# Patient Record
Sex: MALE | Race: WHITE | NOT HISPANIC OR LATINO | Employment: FULL TIME | URBAN - NONMETROPOLITAN AREA
[De-identification: names, ages, dates, MRNs, and addresses within clinical notes are randomized per-mention and may not be internally consistent; named-entity substitution may affect disease eponyms.]

---

## 2023-04-18 PROBLEM — R91.8 LUNG NODULES: Status: ACTIVE | Noted: 2023-04-18

## 2023-04-18 PROBLEM — I15.2 HYPERTENSION ASSOCIATED WITH DIABETES (MULTI): Status: ACTIVE | Noted: 2023-04-18

## 2023-04-18 PROBLEM — F17.210 CIGARETTE NICOTINE DEPENDENCE: Status: ACTIVE | Noted: 2023-04-18

## 2023-04-18 PROBLEM — K21.9 GERD (GASTROESOPHAGEAL REFLUX DISEASE): Status: ACTIVE | Noted: 2023-04-18

## 2023-04-18 PROBLEM — R35.1 NOCTURIA: Status: ACTIVE | Noted: 2023-04-18

## 2023-04-18 PROBLEM — E11.59 HYPERTENSION ASSOCIATED WITH DIABETES (MULTI): Status: ACTIVE | Noted: 2023-04-18

## 2023-04-18 PROBLEM — N40.1 BENIGN PROSTATIC HYPERPLASIA WITH URINARY FREQUENCY: Status: ACTIVE | Noted: 2023-04-18

## 2023-04-18 PROBLEM — R35.0 BENIGN PROSTATIC HYPERPLASIA WITH URINARY FREQUENCY: Status: ACTIVE | Noted: 2023-04-18

## 2023-04-18 PROBLEM — E78.2 MIXED HYPERLIPIDEMIA DUE TO TYPE 2 DIABETES MELLITUS (MULTI): Status: ACTIVE | Noted: 2023-04-18

## 2023-04-18 PROBLEM — E11.69 MIXED HYPERLIPIDEMIA DUE TO TYPE 2 DIABETES MELLITUS (MULTI): Status: ACTIVE | Noted: 2023-04-18

## 2023-04-18 PROBLEM — G47.33 OSA ON CPAP: Status: ACTIVE | Noted: 2023-04-18

## 2023-04-18 PROBLEM — K63.5 COLON POLYP: Status: ACTIVE | Noted: 2023-04-18

## 2023-04-18 PROBLEM — J44.9 CHRONIC OBSTRUCTIVE PULMONARY DISEASE (MULTI): Status: ACTIVE | Noted: 2023-04-18

## 2023-04-18 RX ORDER — ALBUTEROL SULFATE 90 UG/1
2 AEROSOL, METERED RESPIRATORY (INHALATION) EVERY 6 HOURS PRN
COMMUNITY
Start: 2020-08-19 | End: 2023-06-29 | Stop reason: SDUPTHER

## 2023-04-18 RX ORDER — CHLORTHALIDONE 25 MG/1
1 TABLET ORAL DAILY
COMMUNITY
Start: 2022-03-02 | End: 2024-02-09 | Stop reason: SDUPTHER

## 2023-04-18 RX ORDER — IMIQUIMOD 12.5 MG/.25G
1 CREAM TOPICAL DAILY
COMMUNITY
Start: 2022-02-02

## 2023-04-18 RX ORDER — TIRZEPATIDE 2.5 MG/.5ML
2.5 INJECTION, SOLUTION SUBCUTANEOUS
COMMUNITY
End: 2023-08-08 | Stop reason: ALTCHOICE

## 2023-04-18 RX ORDER — IPRATROPIUM BROMIDE AND ALBUTEROL SULFATE 2.5; .5 MG/3ML; MG/3ML
3 SOLUTION RESPIRATORY (INHALATION) 4 TIMES DAILY
COMMUNITY
Start: 2022-02-02

## 2023-04-18 RX ORDER — LISINOPRIL 10 MG/1
1 TABLET ORAL DAILY
COMMUNITY
Start: 2022-03-02 | End: 2024-02-09 | Stop reason: SDUPTHER

## 2023-04-18 RX ORDER — OMEPRAZOLE 40 MG/1
1 CAPSULE, DELAYED RELEASE ORAL DAILY
COMMUNITY
Start: 2020-10-09 | End: 2023-09-05 | Stop reason: SDUPTHER

## 2023-04-18 RX ORDER — ATORVASTATIN CALCIUM 40 MG/1
1 TABLET, FILM COATED ORAL NIGHTLY
COMMUNITY
Start: 2020-09-09 | End: 2024-02-09 | Stop reason: SDUPTHER

## 2023-04-18 RX ORDER — NIFEDIPINE 30 MG/1
1 TABLET, FILM COATED, EXTENDED RELEASE ORAL DAILY
COMMUNITY
Start: 2022-02-02 | End: 2024-02-09 | Stop reason: SDUPTHER

## 2023-04-18 RX ORDER — ASPIRIN 81 MG/1
1 TABLET ORAL DAILY
COMMUNITY
Start: 2020-09-14 | End: 2023-09-05 | Stop reason: SDUPTHER

## 2023-04-18 RX ORDER — CLOTRIMAZOLE 1 %
1 CREAM (GRAM) TOPICAL 2 TIMES DAILY
COMMUNITY
Start: 2022-02-02

## 2023-04-19 ENCOUNTER — OFFICE VISIT (OUTPATIENT)
Dept: PRIMARY CARE | Facility: CLINIC | Age: 53
End: 2023-04-19
Payer: COMMERCIAL

## 2023-04-19 VITALS
BODY MASS INDEX: 40.43 KG/M2 | HEIGHT: 74 IN | WEIGHT: 315 LBS | HEART RATE: 84 BPM | DIASTOLIC BLOOD PRESSURE: 82 MMHG | SYSTOLIC BLOOD PRESSURE: 120 MMHG

## 2023-04-19 DIAGNOSIS — Z09 HOSPITAL DISCHARGE FOLLOW-UP: ICD-10-CM

## 2023-04-19 DIAGNOSIS — M79.604 LOW BACK PAIN RADIATING TO RIGHT LEG: ICD-10-CM

## 2023-04-19 DIAGNOSIS — I15.2 HYPERTENSION ASSOCIATED WITH DIABETES (MULTI): Primary | ICD-10-CM

## 2023-04-19 DIAGNOSIS — I65.23 BILATERAL CAROTID ARTERY STENOSIS: ICD-10-CM

## 2023-04-19 DIAGNOSIS — E66.01 CLASS 3 SEVERE OBESITY DUE TO EXCESS CALORIES WITH SERIOUS COMORBIDITY AND BODY MASS INDEX (BMI) OF 45.0 TO 49.9 IN ADULT (MULTI): ICD-10-CM

## 2023-04-19 DIAGNOSIS — E11.59 HYPERTENSION ASSOCIATED WITH DIABETES (MULTI): Primary | ICD-10-CM

## 2023-04-19 DIAGNOSIS — D22.9 ATYPICAL MOLE: ICD-10-CM

## 2023-04-19 DIAGNOSIS — M54.50 LOW BACK PAIN RADIATING TO RIGHT LEG: ICD-10-CM

## 2023-04-19 PROBLEM — E66.813 CLASS 3 SEVERE OBESITY DUE TO EXCESS CALORIES WITH SERIOUS COMORBIDITY AND BODY MASS INDEX (BMI) OF 45.0 TO 49.9 IN ADULT: Status: ACTIVE | Noted: 2023-04-19

## 2023-04-19 PROCEDURE — 3079F DIAST BP 80-89 MM HG: CPT | Performed by: NURSE PRACTITIONER

## 2023-04-19 PROCEDURE — 4010F ACE/ARB THERAPY RXD/TAKEN: CPT | Performed by: NURSE PRACTITIONER

## 2023-04-19 PROCEDURE — 99214 OFFICE O/P EST MOD 30 MIN: CPT | Performed by: NURSE PRACTITIONER

## 2023-04-19 PROCEDURE — 4004F PT TOBACCO SCREEN RCVD TLK: CPT | Performed by: NURSE PRACTITIONER

## 2023-04-19 PROCEDURE — 3008F BODY MASS INDEX DOCD: CPT | Performed by: NURSE PRACTITIONER

## 2023-04-19 PROCEDURE — 3044F HG A1C LEVEL LT 7.0%: CPT | Performed by: NURSE PRACTITIONER

## 2023-04-19 PROCEDURE — 3074F SYST BP LT 130 MM HG: CPT | Performed by: NURSE PRACTITIONER

## 2023-04-19 RX ORDER — METOPROLOL SUCCINATE 25 MG/1
25 TABLET, EXTENDED RELEASE ORAL DAILY
COMMUNITY
End: 2023-04-19 | Stop reason: SDUPTHER

## 2023-04-19 RX ORDER — METOPROLOL SUCCINATE 25 MG/1
25 TABLET, EXTENDED RELEASE ORAL DAILY
Qty: 90 TABLET | Refills: 3 | Status: SHIPPED | OUTPATIENT
Start: 2023-04-19 | End: 2024-02-26 | Stop reason: DRUGHIGH

## 2023-04-19 ASSESSMENT — ENCOUNTER SYMPTOMS
ABDOMINAL PAIN: 0
NERVOUS/ANXIOUS: 0
EYE REDNESS: 0
DIFFICULTY URINATING: 0
JOINT SWELLING: 0
CHILLS: 0
NECK PAIN: 0
SPEECH DIFFICULTY: 0
FLANK PAIN: 0
UNEXPECTED WEIGHT CHANGE: 0
WHEEZING: 0
WOUND: 0
SHORTNESS OF BREATH: 0
BACK PAIN: 1
NAUSEA: 0
PHOTOPHOBIA: 0
CONSTIPATION: 0
VOMITING: 0
APNEA: 0
FATIGUE: 0
SORE THROAT: 0
HEADACHES: 0
FREQUENCY: 0
SLEEP DISTURBANCE: 0
COUGH: 0
WEAKNESS: 0
PALPITATIONS: 0
TROUBLE SWALLOWING: 0
EYE PAIN: 0
MYALGIAS: 0
CHOKING: 0
BLOOD IN STOOL: 0
FEVER: 0
DIZZINESS: 0
SEIZURES: 0
HEMATURIA: 0
ABDOMINAL DISTENTION: 0
NUMBNESS: 0
CONFUSION: 0
ARTHRALGIAS: 0
DYSURIA: 0
CHEST TIGHTNESS: 0
FACIAL ASYMMETRY: 0
DIARRHEA: 0

## 2023-04-19 ASSESSMENT — PATIENT HEALTH QUESTIONNAIRE - PHQ9
2. FEELING DOWN, DEPRESSED OR HOPELESS: NOT AT ALL
1. LITTLE INTEREST OR PLEASURE IN DOING THINGS: NOT AT ALL
SUM OF ALL RESPONSES TO PHQ9 QUESTIONS 1 AND 2: 0

## 2023-04-19 NOTE — PROGRESS NOTES
"Subjective   Patient ID: Boy Davis Jr. is a 52 y.o. male who presents for Hospital Follow-up (F/U HOSPITAL DISCHARGE 4/13/23 CHEST PAIN AND DIZZINESS. PATIENT STILL HAS SOME CHEST PAIN OFF/ON BUT OVERALL FEELING WELL. PENDING APPOINTMENT WITH DR. KEARNS THIS Friday ([facility]). DISCHARGED WITH NEW MED METOPROLOL WHICH HE CLAIMS TO BE TAKING. ).      HPI:  Presents today for [facility] HOSPITAL DISCHARGE ON 4/13/23 FOR CP AND DIZZINESS. HEART CATH DONE AND UNREMARKABLE. CAROTID DOPPLER SHOWED B/L STENOSIS LESS THAN 50%. APPT WITH [facility] ON 4/21/23.  HE HAS BEEN OFF WORK UNTIL HE IS CLEARED TO GO BACK ROM [facility]. C/O LOW BACK PAIN THAT RADIATES TO RIGHT THIGH X 3 WEEKS modifying factors consists of DENIES INJURY  associated symptoms consist of DENIES BOWEL OR BLADDER SYMPTOMS.  prior treatment consists of medication NONE    C/O 2 MOLES THAT HAVE GROWN AND CHANGED COLORS OVER THE PAST SEVERAL YEARS    modifying factors consists of    associated symptoms consist of     prior treatment consists of medication NONE     Visit Vitals  /82   Pulse 84   Ht 1.88 m (6' 2\")   Wt (!) 173 kg (381 lb)   BMI 48.92 kg/m²   Smoking Status Every Day   BSA 3.01 m²        Review of Systems   Constitutional:  Negative for chills, fatigue, fever and unexpected weight change.   HENT:  Negative for congestion, ear pain, sore throat and trouble swallowing.    Eyes:  Negative for photophobia, pain, redness and visual disturbance.   Respiratory:  Negative for apnea, cough, choking, chest tightness, shortness of breath and wheezing.    Cardiovascular:  Negative for chest pain, palpitations and leg swelling.   Gastrointestinal:  Negative for abdominal distention, abdominal pain, blood in stool, constipation, diarrhea, nausea and vomiting.   Genitourinary:  Negative for difficulty urinating, dysuria, flank pain, frequency, hematuria and urgency.   Musculoskeletal:  Positive for back pain. Negative for arthralgias, gait problem, joint swelling, " myalgias and neck pain.   Skin:  Negative for rash and wound.   Neurological:  Negative for dizziness, seizures, syncope, facial asymmetry, speech difficulty, weakness, numbness and headaches.   Psychiatric/Behavioral:  Negative for confusion, sleep disturbance and suicidal ideas. The patient is not nervous/anxious.        Objective     Physical Exam  Constitutional:       Appearance: Normal appearance. He is normal weight.   HENT:      Head: Normocephalic.   Eyes:      Extraocular Movements: Extraocular movements intact.      Conjunctiva/sclera: Conjunctivae normal.      Pupils: Pupils are equal, round, and reactive to light.   Cardiovascular:      Rate and Rhythm: Normal rate and regular rhythm.      Pulses: Normal pulses.      Heart sounds: Normal heart sounds.   Pulmonary:      Effort: Pulmonary effort is normal.      Breath sounds: Normal breath sounds.   Musculoskeletal:      Cervical back: Normal range of motion.      Comments: RIGHT SIDED LOW BACK PAIN   Skin:     General: Skin is warm and dry.      Comments: MOLE UNDER LEFT EYE. RAISED 2 DIFFERENT COLORS. IRREGULAR BORDERS.      MOLE TO LEFT SIDE OF NECK. BLACK AND RAISED WITH IRREGULAR BORDERS    Neurological:      General: No focal deficit present.      Mental Status: He is alert and oriented to person, place, and time.   Psychiatric:         Mood and Affect: Mood normal.         Behavior: Behavior normal.         Thought Content: Thought content normal.         Judgment: Judgment normal.            Assessment/Plan   Problem List Items Addressed This Visit          Circulatory    Hypertension associated with diabetes (CMS/[facility]) - Primary    Relevant Medications    metoprolol succinate XL (Toprol-XL) 25 mg 24 hr tablet    Bilateral carotid artery stenosis       Endocrine/Metabolic    Class 3 severe obesity due to excess calories with serious comorbidity and body mass index (BMI) of 45.0 to 49.9 in adult (CMS/[facility])       Other    Atypical mole    Relevant  Orders    Referral to Dermatology    Hospital discharge follow-up    Low back pain radiating to right leg       INSTRUCTED TO DISCUSS STEROID USE WITH [facility] AND IF IN AGREEMENT, RETURN Friday FOR DEXA INJECTION.     WE DISCUSSED MOST COMMON SIDE EFFECTS OF PRESCRIBED MEDICATIONS. INDICATIONS, RISK, COMPLICATIONS, AND ALTERNATIVES OF MEDICATION/THERAPEUTICS WERE EXPLAINED AND DISCUSSED. PLEASE MONITOR CLOSELY FOR ANY UNTOWARD SIDE EFFECTS OR COMPLICATIONS OF MEDICATIONS. PATIENT IS STRONGLY ADVISED TO BE COMPLIANT WITH RECOMMENDATIONS. QUESTIONS AND CONCERNS WERE ADDRESSED. INSTRUCTED TO CALL, RETURN SOONER, OR GO TO THE ER,  IF SYMPTOMS PERSIST OR WORSEN. THEY VOICED UNDERSTANDING AND  DENIES FURTHER QUESTIONS AT THIS TIME.    TIME CODE  1. PREPARATION FOR PATIENT'S VISIT (REVIEWING CHART, CURRENT MEDICAL RECORDS, OUTSIDE HEALTH PROVIDER RECORDS, PREVIOUS HISTORY, EXAM, TEST, PROCEDURE, AND MEDICATIONS)  2. FACE TO FACE ENCOUNTER OBTAINING HISTORY FROM THE PATIENT/FAMILY/CAREGIVERS; PERFORMING EVALUATION AND EXAMINATION; ORDERING TESTS OR PROCEDURES; REFERRING AND COMMUNICATING WITH OTHER HEALTHCARE PROVIDERS; COUNSELING AND EDUCATION OF THE PATIENT/FAMILY/CAREGIVERS; INDEPENDENTLY INTERPRETING RESULTS (TESTS, LABS, PROCEDURES, IMAGING) AND COMMUNICATING AND EXPLAINING RESULTS TO THE PATIENT/FAMILY/CAREGIVERS  3. COORDINATION OF CARE; PREPARING AND PRINTING DISCHARGE INSTRUCTIONS AND ANY EDUCATIONAL MATERIAL FOR THE PATIENT/FAMILY/CAREGIVERS. DOCUMENTING CLINICAL INFORMATION IN THE ELECTRONIC MEDICAL RECORD   4. REVIEWING OARRS AS NEEDED    MDM  1) COMPLEXITY: MORE THAN 1 STABLE CHRONIC CONDITION ADDRESSED OR 1 ACUTE ILLNESS ADDRESSED   2)DATA: TESTS INTERPRETED AND OR ORDERED, TOOK INDEPENDENT HISTORY OR RECORDS REVIEWED  3)RISK: MODERATE RISK DUE TO NATURE OF MEDICAL CONDITIONS/COMORBIDITY OR MEDICATIONS ORDERED OR SURGICAL OR PROCEDURE REFERRAL    Follow up as before    REFER TO [facility] - [facility]

## 2023-05-04 PROBLEM — R94.39 ABNORMAL STRESS TEST: Status: ACTIVE | Noted: 2023-05-04

## 2023-05-04 PROBLEM — R60.0 EDEMA, LOWER EXTREMITY: Status: ACTIVE | Noted: 2023-05-04

## 2023-05-04 PROBLEM — R00.2 PALPITATIONS: Status: ACTIVE | Noted: 2023-05-04

## 2023-05-04 PROBLEM — I87.2 STASIS DERMATITIS, ACUTE: Status: ACTIVE | Noted: 2023-05-04

## 2023-05-09 ENCOUNTER — OFFICE VISIT (OUTPATIENT)
Dept: PRIMARY CARE | Facility: CLINIC | Age: 53
End: 2023-05-09
Payer: COMMERCIAL

## 2023-05-09 VITALS
HEART RATE: 87 BPM | SYSTOLIC BLOOD PRESSURE: 124 MMHG | DIASTOLIC BLOOD PRESSURE: 83 MMHG | BODY MASS INDEX: 40.43 KG/M2 | OXYGEN SATURATION: 96 % | WEIGHT: 315 LBS | HEIGHT: 74 IN

## 2023-05-09 DIAGNOSIS — M54.41 ACUTE RIGHT-SIDED LOW BACK PAIN WITH RIGHT-SIDED SCIATICA: Primary | ICD-10-CM

## 2023-05-09 DIAGNOSIS — E66.01 CLASS 3 SEVERE OBESITY DUE TO EXCESS CALORIES WITH SERIOUS COMORBIDITY AND BODY MASS INDEX (BMI) OF 45.0 TO 49.9 IN ADULT (MULTI): ICD-10-CM

## 2023-05-09 PROCEDURE — 4004F PT TOBACCO SCREEN RCVD TLK: CPT | Performed by: NURSE PRACTITIONER

## 2023-05-09 PROCEDURE — 99214 OFFICE O/P EST MOD 30 MIN: CPT | Performed by: NURSE PRACTITIONER

## 2023-05-09 PROCEDURE — 96372 THER/PROPH/DIAG INJ SC/IM: CPT | Performed by: NURSE PRACTITIONER

## 2023-05-09 PROCEDURE — 3008F BODY MASS INDEX DOCD: CPT | Performed by: NURSE PRACTITIONER

## 2023-05-09 PROCEDURE — 3079F DIAST BP 80-89 MM HG: CPT | Performed by: NURSE PRACTITIONER

## 2023-05-09 PROCEDURE — 3074F SYST BP LT 130 MM HG: CPT | Performed by: NURSE PRACTITIONER

## 2023-05-09 PROCEDURE — 4010F ACE/ARB THERAPY RXD/TAKEN: CPT | Performed by: NURSE PRACTITIONER

## 2023-05-09 PROCEDURE — 3044F HG A1C LEVEL LT 7.0%: CPT | Performed by: NURSE PRACTITIONER

## 2023-05-09 RX ORDER — PREDNISONE 10 MG/1
30 TABLET ORAL DAILY
Qty: 15 TABLET | Refills: 0 | Status: SHIPPED
Start: 2023-05-09 | End: 2023-06-13 | Stop reason: ALTCHOICE

## 2023-05-09 RX ORDER — NAPROXEN 500 MG/1
500 TABLET ORAL 2 TIMES DAILY PRN
Qty: 60 TABLET | Refills: 0 | Status: SHIPPED
Start: 2023-05-09 | End: 2023-06-13 | Stop reason: ALTCHOICE

## 2023-05-09 ASSESSMENT — ENCOUNTER SYMPTOMS
NUMBNESS: 0
PALPITATIONS: 0
HEMATURIA: 0
SPEECH DIFFICULTY: 0
ABDOMINAL DISTENTION: 0
WOUND: 0
NECK PAIN: 0
APNEA: 0
BLOOD IN STOOL: 0
PHOTOPHOBIA: 0
CHOKING: 0
FREQUENCY: 0
FLANK PAIN: 0
UNEXPECTED WEIGHT CHANGE: 0
MYALGIAS: 0
FEVER: 0
DIZZINESS: 0
CONSTIPATION: 0
SORE THROAT: 0
FATIGUE: 0
COUGH: 0
BACK PAIN: 1
TROUBLE SWALLOWING: 0
WEAKNESS: 0
NAUSEA: 0
DYSURIA: 0
ARTHRALGIAS: 0
EYE REDNESS: 0
CONFUSION: 0
NERVOUS/ANXIOUS: 0
FACIAL ASYMMETRY: 0
SEIZURES: 0
HEADACHES: 0
SHORTNESS OF BREATH: 0
DIARRHEA: 0
DIFFICULTY URINATING: 0
SLEEP DISTURBANCE: 0
EYE PAIN: 0
VOMITING: 0
CHEST TIGHTNESS: 0
CHILLS: 0
JOINT SWELLING: 0
WHEEZING: 0
ABDOMINAL PAIN: 0

## 2023-05-09 NOTE — PROGRESS NOTES
"Subjective   Patient ID: Boy Davis Jr. is a 52 y.o. male who presents for Leg Pain (RIGHT; X 1 MONTH ) and Hip Pain (RIGHT X 1 MONTH ).      HPI:  Presents today for C/O RIGHT LOW BACK PAIN X 1 MONTH  modifying factors consists of DENIES INJURY  associated symptoms consist of RADIATES TO RIGHT HIP  prior treatment consists of medication IBUPROFEN     Visit Vitals  /83 (BP Location: Left arm, Patient Position: Sitting)   Pulse 87   Ht 1.88 m (6' 2\")   Wt (!) 168 kg (370 lb)   SpO2 96%   BMI 47.51 kg/m²   Smoking Status Every Day   BSA 2.96 m²        Review of Systems   Constitutional:  Negative for chills, fatigue, fever and unexpected weight change.   HENT:  Negative for congestion, ear pain, sore throat and trouble swallowing.    Eyes:  Negative for photophobia, pain, redness and visual disturbance.   Respiratory:  Negative for apnea, cough, choking, chest tightness, shortness of breath and wheezing.    Cardiovascular:  Negative for chest pain, palpitations and leg swelling.   Gastrointestinal:  Negative for abdominal distention, abdominal pain, blood in stool, constipation, diarrhea, nausea and vomiting.   Genitourinary:  Negative for difficulty urinating, dysuria, flank pain, frequency, hematuria and urgency.   Musculoskeletal:  Positive for back pain. Negative for arthralgias, gait problem, joint swelling, myalgias and neck pain.   Skin:  Negative for rash and wound.   Neurological:  Negative for dizziness, seizures, syncope, facial asymmetry, speech difficulty, weakness, numbness and headaches.   Psychiatric/Behavioral:  Negative for confusion, sleep disturbance and suicidal ideas. The patient is not nervous/anxious.        Objective     Physical Exam  Constitutional:       Appearance: Normal appearance. He is normal weight.   HENT:      Head: Normocephalic.   Eyes:      Extraocular Movements: Extraocular movements intact.      Conjunctiva/sclera: Conjunctivae normal.      Pupils: Pupils are equal, " round, and reactive to light.   Cardiovascular:      Rate and Rhythm: Normal rate and regular rhythm.      Pulses: Normal pulses.      Heart sounds: Normal heart sounds.   Pulmonary:      Effort: Pulmonary effort is normal.      Breath sounds: Normal breath sounds.   Musculoskeletal:         General: Normal range of motion.      Cervical back: Normal range of motion.      Comments: LOW BACK PIAN RIGHT SIDE   Skin:     General: Skin is warm and dry.   Neurological:      General: No focal deficit present.      Mental Status: He is alert and oriented to person, place, and time.   Psychiatric:         Mood and Affect: Mood normal.         Behavior: Behavior normal.         Thought Content: Thought content normal.         Judgment: Judgment normal.            Assessment/Plan   Problem List Items Addressed This Visit          Endocrine/Metabolic    Class 3 severe obesity due to excess calories with serious comorbidity and body mass index (BMI) of 45.0 to 49.9 in adult (CMS/[facility])       Other    Acute right-sided low back pain with right-sided sciatica - Primary    Relevant Medications    predniSONE (Deltasone) 10 mg tablet    dexAMETHasone (Decadron) injection 4 mg (Start on 5/9/2023  3:15 PM)    naproxen (Naprosyn) 500 mg tablet     ALTERNATE HEAT AND ICE TO AFFECTED AREA 20 MINUTES ON/20 MINUTES OFF    WE DISCUSSED MOST COMMON SIDE EFFECTS OF PRESCRIBED MEDICATIONS. INDICATIONS, RISK, COMPLICATIONS, AND ALTERNATIVES OF MEDICATION/THERAPEUTICS WERE EXPLAINED AND DISCUSSED. PLEASE MONITOR CLOSELY FOR ANY UNTOWARD SIDE EFFECTS OR COMPLICATIONS OF MEDICATIONS. PATIENT IS STRONGLY ADVISED TO BE COMPLIANT WITH RECOMMENDATIONS. QUESTIONS AND CONCERNS WERE ADDRESSED. INSTRUCTED TO CALL, RETURN SOONER, OR GO TO THE ER,  IF SYMPTOMS PERSIST OR WORSEN. THEY VOICED UNDERSTANDING AND  DENIES FURTHER QUESTIONS AT THIS TIME.    TIME CODE  1. PREPARATION FOR PATIENT'S VISIT (REVIEWING CHART, CURRENT MEDICAL RECORDS, OUTSIDE HEALTH  PROVIDER RECORDS, PREVIOUS HISTORY, EXAM, TEST, PROCEDURE, AND MEDICATIONS)  2. FACE TO FACE ENCOUNTER OBTAINING HISTORY FROM THE PATIENT/FAMILY/CAREGIVERS; PERFORMING EVALUATION AND EXAMINATION; ORDERING TESTS OR PROCEDURES; REFERRING AND COMMUNICATING WITH OTHER HEALTHCARE PROVIDERS; COUNSELING AND EDUCATION OF THE PATIENT/FAMILY/CAREGIVERS; INDEPENDENTLY INTERPRETING RESULTS (TESTS, LABS, PROCEDURES, IMAGING) AND COMMUNICATING AND EXPLAINING RESULTS TO THE PATIENT/FAMILY/CAREGIVERS  3. COORDINATION OF CARE; PREPARING AND PRINTING DISCHARGE INSTRUCTIONS AND ANY EDUCATIONAL MATERIAL FOR THE PATIENT/FAMILY/CAREGIVERS. DOCUMENTING CLINICAL INFORMATION IN THE ELECTRONIC MEDICAL RECORD   4. REVIEWING OARRS AS NEEDED    MDM  1) COMPLEXITY: MORE THAN 1 STABLE CHRONIC CONDITION ADDRESSED OR 1 ACUTE ILLNESS ADDRESSED   2)DATA: TESTS INTERPRETED AND OR ORDERED, TOOK INDEPENDENT HISTORY OR RECORDS REVIEWED  3)RISK: MODERATE RISK DUE TO NATURE OF MEDICAL CONDITIONS/COMORBIDITY OR MEDICATIONS ORDERED OR SURGICAL OR PROCEDURE REFERRAL    Follow up as before

## 2023-06-13 ENCOUNTER — OFFICE VISIT (OUTPATIENT)
Dept: PRIMARY CARE | Facility: CLINIC | Age: 53
End: 2023-06-13
Payer: COMMERCIAL

## 2023-06-13 ENCOUNTER — TELEPHONE (OUTPATIENT)
Dept: PRIMARY CARE | Facility: CLINIC | Age: 53
End: 2023-06-13

## 2023-06-13 VITALS
DIASTOLIC BLOOD PRESSURE: 73 MMHG | WEIGHT: 315 LBS | HEIGHT: 74 IN | SYSTOLIC BLOOD PRESSURE: 115 MMHG | HEART RATE: 77 BPM | BODY MASS INDEX: 40.43 KG/M2

## 2023-06-13 DIAGNOSIS — E66.01 CLASS 3 SEVERE OBESITY DUE TO EXCESS CALORIES WITH SERIOUS COMORBIDITY AND BODY MASS INDEX (BMI) OF 45.0 TO 49.9 IN ADULT (MULTI): ICD-10-CM

## 2023-06-13 DIAGNOSIS — S32.050A COMPRESSION FRACTURE OF L5 VERTEBRA, INITIAL ENCOUNTER (MULTI): ICD-10-CM

## 2023-06-13 DIAGNOSIS — M79.604 LOW BACK PAIN RADIATING TO RIGHT LEG: Primary | ICD-10-CM

## 2023-06-13 DIAGNOSIS — M54.50 LOW BACK PAIN RADIATING TO RIGHT LEG: Primary | ICD-10-CM

## 2023-06-13 DIAGNOSIS — M51.36 LUMBAR DEGENERATIVE DISC DISEASE: ICD-10-CM

## 2023-06-13 PROCEDURE — 3074F SYST BP LT 130 MM HG: CPT | Performed by: NURSE PRACTITIONER

## 2023-06-13 PROCEDURE — 99214 OFFICE O/P EST MOD 30 MIN: CPT | Performed by: NURSE PRACTITIONER

## 2023-06-13 PROCEDURE — 3044F HG A1C LEVEL LT 7.0%: CPT | Performed by: NURSE PRACTITIONER

## 2023-06-13 PROCEDURE — 3078F DIAST BP <80 MM HG: CPT | Performed by: NURSE PRACTITIONER

## 2023-06-13 PROCEDURE — 3008F BODY MASS INDEX DOCD: CPT | Performed by: NURSE PRACTITIONER

## 2023-06-13 PROCEDURE — 4004F PT TOBACCO SCREEN RCVD TLK: CPT | Performed by: NURSE PRACTITIONER

## 2023-06-13 PROCEDURE — 4010F ACE/ARB THERAPY RXD/TAKEN: CPT | Performed by: NURSE PRACTITIONER

## 2023-06-13 RX ORDER — HYDROCODONE BITARTRATE AND ACETAMINOPHEN 5; 325 MG/1; MG/1
1 TABLET ORAL EVERY 6 HOURS PRN
Qty: 20 TABLET | Refills: 0 | Status: SHIPPED | OUTPATIENT
Start: 2023-06-13 | End: 2023-06-18

## 2023-06-13 RX ORDER — CYCLOBENZAPRINE HCL 10 MG
10 TABLET ORAL 3 TIMES DAILY PRN
Qty: 90 TABLET | Refills: 0 | Status: SHIPPED | OUTPATIENT
Start: 2023-06-13 | End: 2024-02-09 | Stop reason: SDUPTHER

## 2023-06-13 ASSESSMENT — ENCOUNTER SYMPTOMS
NECK PAIN: 0
ABDOMINAL PAIN: 0
CHOKING: 0
HEADACHES: 0
NUMBNESS: 0
PALPITATIONS: 0
HEMATURIA: 0
SEIZURES: 0
FEVER: 0
WOUND: 0
TROUBLE SWALLOWING: 0
PHOTOPHOBIA: 0
CONFUSION: 0
EYE REDNESS: 0
DIZZINESS: 0
MYALGIAS: 0
CONSTIPATION: 0
WHEEZING: 0
DIFFICULTY URINATING: 0
EYE PAIN: 0
DYSURIA: 0
UNEXPECTED WEIGHT CHANGE: 0
NAUSEA: 0
ABDOMINAL DISTENTION: 0
SLEEP DISTURBANCE: 0
FATIGUE: 0
CHEST TIGHTNESS: 0
JOINT SWELLING: 0
ARTHRALGIAS: 1
DIARRHEA: 0
WEAKNESS: 0
SHORTNESS OF BREATH: 0
NERVOUS/ANXIOUS: 0
BLOOD IN STOOL: 0
CHILLS: 0
FACIAL ASYMMETRY: 0
VOMITING: 0
BACK PAIN: 1
FREQUENCY: 0
FLANK PAIN: 0
SORE THROAT: 0
APNEA: 0
COUGH: 0
SPEECH DIFFICULTY: 0

## 2023-06-13 NOTE — PROGRESS NOTES
"Subjective   Patient ID: Boy Davis Jr. is a 52 y.o. male who presents for Follow-up (C/O WORSENING RT LEG PAIN WITH LOW BACK PAIN AND LT GROIN PAIN. NEEDS WORK NOTE).      HPI:  Presents today for C/O LOW BACK PAIN  X 6-8 WEEKS modifying factors consists of CHRONIC LOW BACK PAIN THAT HAS WORSENED OVER THE PAST 2 MONTHS. DENIES INJURY  associated symptoms consist of RIGHT PAIN RADIATES TO FOOT. LEFT PAIN RADIATES TO GROIN AREA. NO BOWEL OR BLADDER SYMPTOMS. TROUBLE SLEEPING R/T PAIN. PAIN 10+/10 AT TIMES  prior treatment consists of medication STRETCHES AND EXERCISES GUIDED BY PCP OTC ADVIL, NAPROXEN, TYLENOL, DEXA INJECTION, AND PO PREDNISONE. ICE AND HEAT. CANE     NEEDS WORK EXCUSE. WENT HOME YESTERDAY EARLY AND CALLED DOFF WORK TODAY R/T PAIN    Visit Vitals  /73   Pulse 77   Ht 1.88 m (6' 2\")   Wt (!) 168 kg (370 lb)   BMI 47.51 kg/m²   Smoking Status Every Day   BSA 2.96 m²        Review of Systems   Constitutional:  Negative for chills, fatigue, fever and unexpected weight change.   HENT:  Negative for congestion, ear pain, sore throat and trouble swallowing.    Eyes:  Negative for photophobia, pain, redness and visual disturbance.   Respiratory:  Negative for apnea, cough, choking, chest tightness, shortness of breath and wheezing.    Cardiovascular:  Negative for chest pain, palpitations and leg swelling.   Gastrointestinal:  Negative for abdominal distention, abdominal pain, blood in stool, constipation, diarrhea, nausea and vomiting.   Genitourinary:  Negative for difficulty urinating, dysuria, flank pain, frequency, hematuria and urgency.   Musculoskeletal:  Positive for arthralgias and back pain. Negative for gait problem, joint swelling, myalgias and neck pain.   Skin:  Negative for rash and wound.   Neurological:  Negative for dizziness, seizures, syncope, facial asymmetry, speech difficulty, weakness, numbness and headaches.   Psychiatric/Behavioral:  Negative for confusion, sleep " disturbance and suicidal ideas. The patient is not nervous/anxious.        Objective   STUDY:  SPINE, LUMBOSACRAL  CMPLT(BENDING)     INDICATION:  back pain.     COMPARISON:  None     ACCESSION NUMBER(S):  [id number]     ORDERING CLINICIAN:  ERIC BARNARD     FINDINGS:  Mild vertebral compression fracture at the superior endplate of L5,  age indeterminate.     Moderate to advanced diffuse lumbar degenerative change at all levels.     Mild levoconvex lumbar scoliosis. No pathologic motion.     IMPRESSION:  Mild vertebral compression fracture at the superior endplate of L5,  age indeterminate. Correlation with the patient's clinical  presentation and any history of trauma can determine the need for  further evaluation by MRI.     Moderate to advanced diffuse lumbar degenerative change at all levels.     Mild levoconvex lumbar scoliosis.  Physical Exam  Constitutional:       Appearance: Normal appearance. He is normal weight.   HENT:      Head: Normocephalic.   Eyes:      Extraocular Movements: Extraocular movements intact.      Conjunctiva/sclera: Conjunctivae normal.      Pupils: Pupils are equal, round, and reactive to light.   Cardiovascular:      Rate and Rhythm: Normal rate and regular rhythm.      Pulses: Normal pulses.      Heart sounds: Normal heart sounds.   Pulmonary:      Effort: Pulmonary effort is normal.      Breath sounds: Normal breath sounds.   Musculoskeletal:         General: Normal range of motion.      Cervical back: Normal range of motion.      Comments: LOW BACK PAIN R>L    WALKS WITH CANE   Skin:     General: Skin is warm and dry.   Neurological:      General: No focal deficit present.      Mental Status: He is alert and oriented to person, place, and time.   Psychiatric:         Mood and Affect: Mood normal.         Behavior: Behavior normal.         Thought Content: Thought content normal.         Judgment: Judgment normal.            Assessment/Plan   Problem List Items Addressed This Visit           Musculoskeletal    Compression fracture of fifth lumbar vertebra (CMS/HCC)    Relevant Orders    MR lumbar spine w and wo IV contrast    Creatinine, Serum    Referral to Neurosurgery    Back brace    XR DEXA bone density    Lumbar degenerative disc disease    Relevant Orders    MR lumbar spine w and wo IV contrast    Creatinine, Serum    Referral to Neurosurgery    XR DEXA bone density       Endocrine/Metabolic    Class 3 severe obesity due to excess calories with serious comorbidity and body mass index (BMI) of 45.0 to 49.9 in adult (CMS/HCC)       Other    Low back pain radiating to right leg - Primary    Relevant Medications    cyclobenzaprine (Flexeril) 10 mg tablet    HYDROcodone-acetaminophen (Norco) 5-325 mg tablet    Other Relevant Orders    XR lumbar spine 6+ views including oblique flexion extension (Completed)    MR lumbar spine w and wo IV contrast    Creatinine, Serum    Referral to Neurosurgery     SINCE HE HAS BEEN DOING THE EXERCISES AND STRETCHES GUIDED BY ME WITH NO RESULTS, AND THE MEDICATIONS HAVE NOT HELPED, I WILL ORDER  AN MRI TO FURTHER EVALUATE.     ADDENDUM 6/15/23- XR SHOWS VERTEBRAL COMPRESSION FRACTURE AT SUPERIOR ENDPLATE OF L5, ALONG WITH MODERATE TO ADVANCED DIFFUSE LUMBAR DEGENERATIVE CHANGES. MRI L SPINE ORDERED. TLSO BACK BRRACE ORDERED. REFER TO NEUROSURGERY       I HAVE REVIEWED THE OARRS, WHICH WAS APPROPRIATE. I HAVE EVALUATED THE RISKS OF ABUSE, ADDICTION, DIVERSION, AND DEPENDENCE. PRESCRIBED MEDICATION SEEMS APPROPRIATE FOR DOCUMENTED DIAGNOSIS. NO MED AGREEMENT OR UTOX COLLECTED R/T TEMP MED     WE DISCUSSED MOST COMMON SIDE EFFECTS OF PRESCRIBED MEDICATIONS. INDICATIONS, RISK, COMPLICATIONS, AND ALTERNATIVES OF MEDICATION/THERAPEUTICS WERE EXPLAINED AND DISCUSSED. PLEASE MONITOR CLOSELY FOR ANY UNTOWARD SIDE EFFECTS OR COMPLICATIONS OF MEDICATIONS. PATIENT IS STRONGLY ADVISED TO BE COMPLIANT WITH RECOMMENDATIONS. QUESTIONS AND CONCERNS WERE ADDRESSED. INSTRUCTED TO  CALL, RETURN SOONER, OR GO TO THE ER,  IF SYMPTOMS PERSIST OR WORSEN. THEY VOICED UNDERSTANDING AND  DENIES FURTHER QUESTIONS AT THIS TIME.    TIME CODE  1. PREPARATION FOR PATIENT'S VISIT (REVIEWING CHART, CURRENT MEDICAL RECORDS, OUTSIDE HEALTH PROVIDER RECORDS, PREVIOUS HISTORY, EXAM, TEST, PROCEDURE, AND MEDICATIONS)  2. FACE TO FACE ENCOUNTER OBTAINING HISTORY FROM THE PATIENT/FAMILY/CAREGIVERS; PERFORMING EVALUATION AND EXAMINATION; ORDERING TESTS OR PROCEDURES; REFERRING AND COMMUNICATING WITH OTHER HEALTHCARE PROVIDERS; COUNSELING AND EDUCATION OF THE PATIENT/FAMILY/CAREGIVERS; INDEPENDENTLY INTERPRETING RESULTS (TESTS, LABS, PROCEDURES, IMAGING) AND COMMUNICATING AND EXPLAINING RESULTS TO THE PATIENT/FAMILY/CAREGIVERS  3. COORDINATION OF CARE; PREPARING AND PRINTING DISCHARGE INSTRUCTIONS AND ANY EDUCATIONAL MATERIAL FOR THE PATIENT/FAMILY/CAREGIVERS. DOCUMENTING CLINICAL INFORMATION IN THE ELECTRONIC MEDICAL RECORD   4. REVIEWING OARRS AS NEEDED    MDM  1) COMPLEXITY: MORE THAN 1 STABLE CHRONIC CONDITION ADDRESSED OR 1 ACUTE ILLNESS ADDRESSED   2)DATA: TESTS INTERPRETED AND OR ORDERED, TOOK INDEPENDENT HISTORY OR RECORDS REVIEWED  3)RISK: MODERATE RISK DUE TO NATURE OF MEDICAL CONDITIONS/COMORBIDITY OR MEDICATIONS ORDERED OR SURGICAL OR PROCEDURE REFERRAL      Follow up as before  I HAVE AGREED TO CALL WITH MRI RESULTS

## 2023-06-13 NOTE — LETTER
June 13, 2023     Patient: Boy Davis .   YOB: 1970   Date of Visit: 6/13/2023       To Whom It May Concern:    Boy Davis was seen in my clinic on 6/13/2023. Please excuse Boy for his absence from work on 6/12/23 - 6/14/23. He may return to work on 6/15/23.  If you have any questions or concerns, please don't hesitate to call.         Sincerely,         Nat Bryant, APRN-CNP        CC: No Recipients

## 2023-06-15 ENCOUNTER — TELEPHONE (OUTPATIENT)
Dept: PRIMARY CARE | Facility: CLINIC | Age: 53
End: 2023-06-15
Payer: COMMERCIAL

## 2023-06-15 PROBLEM — S32.050A COMPRESSION FRACTURE OF FIFTH LUMBAR VERTEBRA (MULTI): Status: ACTIVE | Noted: 2023-06-15

## 2023-06-15 PROBLEM — M51.36 LUMBAR DEGENERATIVE DISC DISEASE: Status: ACTIVE | Noted: 2023-06-15

## 2023-06-15 PROBLEM — M51.369 LUMBAR DEGENERATIVE DISC DISEASE: Status: ACTIVE | Noted: 2023-06-15

## 2023-06-15 NOTE — TELEPHONE ENCOUNTER
Left detailed message on vociemail for patient regarding referral, dexa, and back brace. Should he have any questions I advised him to call back.

## 2023-06-29 DIAGNOSIS — J44.9 CHRONIC OBSTRUCTIVE PULMONARY DISEASE, UNSPECIFIED COPD TYPE (MULTI): ICD-10-CM

## 2023-06-29 RX ORDER — ALBUTEROL SULFATE 90 UG/1
2 AEROSOL, METERED RESPIRATORY (INHALATION) EVERY 6 HOURS PRN
Qty: 18 G | Refills: 11 | Status: SHIPPED | OUTPATIENT
Start: 2023-06-29

## 2023-07-12 DIAGNOSIS — J44.9 CHRONIC OBSTRUCTIVE PULMONARY DISEASE, UNSPECIFIED COPD TYPE (MULTI): Primary | ICD-10-CM

## 2023-07-22 ENCOUNTER — LAB (OUTPATIENT)
Dept: LAB | Facility: LAB | Age: 53
End: 2023-07-22
Payer: COMMERCIAL

## 2023-07-22 DIAGNOSIS — M79.604 LOW BACK PAIN RADIATING TO RIGHT LEG: ICD-10-CM

## 2023-07-22 DIAGNOSIS — S32.050A COMPRESSION FRACTURE OF L5 VERTEBRA, INITIAL ENCOUNTER (MULTI): ICD-10-CM

## 2023-07-22 DIAGNOSIS — M54.50 LOW BACK PAIN RADIATING TO RIGHT LEG: ICD-10-CM

## 2023-07-22 DIAGNOSIS — M51.36 LUMBAR DEGENERATIVE DISC DISEASE: ICD-10-CM

## 2023-07-22 LAB
CREATININE (MG/DL) IN SER/PLAS: 0.93 MG/DL (ref 0.5–1.3)
GFR MALE: >90 ML/MIN/1.73M2

## 2023-07-22 PROCEDURE — 82565 ASSAY OF CREATININE: CPT

## 2023-07-22 PROCEDURE — 36415 COLL VENOUS BLD VENIPUNCTURE: CPT

## 2023-07-26 ENCOUNTER — APPOINTMENT (OUTPATIENT)
Dept: PRIMARY CARE | Facility: CLINIC | Age: 53
End: 2023-07-26
Payer: COMMERCIAL

## 2023-07-26 ENCOUNTER — TELEPHONE (OUTPATIENT)
Dept: PRIMARY CARE | Facility: CLINIC | Age: 53
End: 2023-07-26

## 2023-07-26 DIAGNOSIS — I15.2 HYPERTENSION ASSOCIATED WITH DIABETES (MULTI): Primary | ICD-10-CM

## 2023-07-26 DIAGNOSIS — E11.59 HYPERTENSION ASSOCIATED WITH DIABETES (MULTI): Primary | ICD-10-CM

## 2023-07-26 NOTE — TELEPHONE ENCOUNTER
----- Message from RALPH Galvez sent at 7/26/2023 11:37 AM EDT -----  APPT TODAY FOR LABS. NOT ALL LABS DONE. I LOOKED IN [facility] AND THEY ARE NO LONGER ACTIVE. DOES HE WANT TO RESCHEDULE AND REDO LABS? CAN KEEP APPT IF NEEDED  ----- Message -----  From: Lab, Background User  Sent: 7/22/2023  11:15 AM EDT  To: RALPH Galvez

## 2023-08-04 ENCOUNTER — LAB (OUTPATIENT)
Dept: LAB | Facility: LAB | Age: 53
End: 2023-08-04
Payer: COMMERCIAL

## 2023-08-04 DIAGNOSIS — I15.2 HYPERTENSION ASSOCIATED WITH DIABETES (MULTI): ICD-10-CM

## 2023-08-04 DIAGNOSIS — E11.59 HYPERTENSION ASSOCIATED WITH DIABETES (MULTI): ICD-10-CM

## 2023-08-04 LAB
ALANINE AMINOTRANSFERASE (SGPT) (U/L) IN SER/PLAS: 18 U/L (ref 10–52)
ALBUMIN (G/DL) IN SER/PLAS: 4.3 G/DL (ref 3.4–5)
ALKALINE PHOSPHATASE (U/L) IN SER/PLAS: 63 U/L (ref 33–120)
ANION GAP IN SER/PLAS: 13 MMOL/L (ref 10–20)
ASPARTATE AMINOTRANSFERASE (SGOT) (U/L) IN SER/PLAS: 11 U/L (ref 9–39)
BILIRUBIN TOTAL (MG/DL) IN SER/PLAS: 0.6 MG/DL (ref 0–1.2)
CALCIUM (MG/DL) IN SER/PLAS: 9.3 MG/DL (ref 8.6–10.3)
CARBON DIOXIDE, TOTAL (MMOL/L) IN SER/PLAS: 26 MMOL/L (ref 21–32)
CHLORIDE (MMOL/L) IN SER/PLAS: 102 MMOL/L (ref 98–107)
CREATININE (MG/DL) IN SER/PLAS: 0.94 MG/DL (ref 0.5–1.3)
ESTIMATED AVERAGE GLUCOSE FOR HBA1C: 126 MG/DL
GFR MALE: >90 ML/MIN/1.73M2
GLUCOSE (MG/DL) IN SER/PLAS: 96 MG/DL (ref 74–99)
HEMOGLOBIN A1C/HEMOGLOBIN TOTAL IN BLOOD: 6 %
POTASSIUM (MMOL/L) IN SER/PLAS: 4.1 MMOL/L (ref 3.5–5.3)
PROTEIN TOTAL: 6.8 G/DL (ref 6.4–8.2)
SODIUM (MMOL/L) IN SER/PLAS: 137 MMOL/L (ref 136–145)
THYROTROPIN (MIU/L) IN SER/PLAS BY DETECTION LIMIT <= 0.05 MIU/L: 1.52 MIU/L (ref 0.44–3.98)
UREA NITROGEN (MG/DL) IN SER/PLAS: 25 MG/DL (ref 6–23)

## 2023-08-04 PROCEDURE — 80053 COMPREHEN METABOLIC PANEL: CPT

## 2023-08-04 PROCEDURE — 84443 ASSAY THYROID STIM HORMONE: CPT

## 2023-08-04 PROCEDURE — 36415 COLL VENOUS BLD VENIPUNCTURE: CPT

## 2023-08-04 PROCEDURE — 83036 HEMOGLOBIN GLYCOSYLATED A1C: CPT

## 2023-08-08 ENCOUNTER — OFFICE VISIT (OUTPATIENT)
Dept: PRIMARY CARE | Facility: CLINIC | Age: 53
End: 2023-08-08
Payer: COMMERCIAL

## 2023-08-08 VITALS
SYSTOLIC BLOOD PRESSURE: 124 MMHG | HEART RATE: 74 BPM | BODY MASS INDEX: 40.43 KG/M2 | WEIGHT: 315 LBS | DIASTOLIC BLOOD PRESSURE: 82 MMHG | HEIGHT: 74 IN

## 2023-08-08 DIAGNOSIS — E11.59 HYPERTENSION ASSOCIATED WITH DIABETES (MULTI): Primary | ICD-10-CM

## 2023-08-08 DIAGNOSIS — E11.69 MIXED HYPERLIPIDEMIA DUE TO TYPE 2 DIABETES MELLITUS (MULTI): ICD-10-CM

## 2023-08-08 DIAGNOSIS — J44.9 CHRONIC OBSTRUCTIVE PULMONARY DISEASE, UNSPECIFIED COPD TYPE (MULTI): ICD-10-CM

## 2023-08-08 DIAGNOSIS — I15.2 HYPERTENSION ASSOCIATED WITH DIABETES (MULTI): Primary | ICD-10-CM

## 2023-08-08 DIAGNOSIS — E66.01 CLASS 3 SEVERE OBESITY DUE TO EXCESS CALORIES WITH SERIOUS COMORBIDITY AND BODY MASS INDEX (BMI) OF 45.0 TO 49.9 IN ADULT (MULTI): ICD-10-CM

## 2023-08-08 DIAGNOSIS — Z12.5 SCREENING PSA (PROSTATE SPECIFIC ANTIGEN): ICD-10-CM

## 2023-08-08 DIAGNOSIS — E78.2 MIXED HYPERLIPIDEMIA DUE TO TYPE 2 DIABETES MELLITUS (MULTI): ICD-10-CM

## 2023-08-08 DIAGNOSIS — S32.050S COMPRESSION FRACTURE OF L5 VERTEBRA, SEQUELA: ICD-10-CM

## 2023-08-08 PROCEDURE — 3008F BODY MASS INDEX DOCD: CPT | Performed by: NURSE PRACTITIONER

## 2023-08-08 PROCEDURE — 3074F SYST BP LT 130 MM HG: CPT | Performed by: NURSE PRACTITIONER

## 2023-08-08 PROCEDURE — 3079F DIAST BP 80-89 MM HG: CPT | Performed by: NURSE PRACTITIONER

## 2023-08-08 PROCEDURE — 4004F PT TOBACCO SCREEN RCVD TLK: CPT | Performed by: NURSE PRACTITIONER

## 2023-08-08 PROCEDURE — 4010F ACE/ARB THERAPY RXD/TAKEN: CPT | Performed by: NURSE PRACTITIONER

## 2023-08-08 PROCEDURE — 99214 OFFICE O/P EST MOD 30 MIN: CPT | Performed by: NURSE PRACTITIONER

## 2023-08-08 PROCEDURE — 3044F HG A1C LEVEL LT 7.0%: CPT | Performed by: NURSE PRACTITIONER

## 2023-08-08 RX ORDER — TIRZEPATIDE 5 MG/.5ML
5 INJECTION, SOLUTION SUBCUTANEOUS
Qty: 2 ML | Refills: 5 | Status: SHIPPED | OUTPATIENT
Start: 2023-08-08 | End: 2024-01-22 | Stop reason: SDUPTHER

## 2023-08-08 ASSESSMENT — ENCOUNTER SYMPTOMS
HEMATURIA: 0
DIZZINESS: 0
JOINT SWELLING: 0
UNEXPECTED WEIGHT CHANGE: 0
NECK PAIN: 0
NERVOUS/ANXIOUS: 0
BACK PAIN: 0
DIARRHEA: 0
ABDOMINAL PAIN: 0
FATIGUE: 0
WOUND: 0
CONFUSION: 0
APNEA: 0
MYALGIAS: 0
FREQUENCY: 0
VOMITING: 0
CONSTIPATION: 0
CHOKING: 0
EYE PAIN: 0
HEADACHES: 0
NAUSEA: 0
ARTHRALGIAS: 0
EYE REDNESS: 0
SEIZURES: 0
NUMBNESS: 0
CHILLS: 0
BLOOD IN STOOL: 0
COUGH: 0
CHEST TIGHTNESS: 0
WEAKNESS: 0
SLEEP DISTURBANCE: 0
SPEECH DIFFICULTY: 0
PALPITATIONS: 0
DIFFICULTY URINATING: 0
SORE THROAT: 0
PHOTOPHOBIA: 0
DYSURIA: 0
WHEEZING: 0
SHORTNESS OF BREATH: 0
ABDOMINAL DISTENTION: 0
FACIAL ASYMMETRY: 0
FLANK PAIN: 0
TROUBLE SWALLOWING: 0
FEVER: 0

## 2023-08-08 NOTE — PROGRESS NOTES
"Subjective   Patient ID: Boy Davis Jr. is a 52 y.o. male who presents for Follow-up (6 MONTH F/U WITH LABS. ).      HPI:  Presents today for [facility] LABS. NO NEW COMPLAINTS       HTN- STABLE  DM- INCREASE MOUNJARO TO 5 MG   BACK FX- FOLLOWING WITH DR. MARINELLI NEUROSURGEON   COPD- STABLE    Visit Vitals  /82   Pulse 74   Ht 1.88 m (6' 2\")   Wt (!) 163 kg (360 lb)   BMI 46.22 kg/m²   Smoking Status Every Day   BSA 2.92 m²        Review of Systems   Constitutional:  Negative for chills, fatigue, fever and unexpected weight change.   HENT:  Negative for congestion, ear pain, sore throat and trouble swallowing.    Eyes:  Negative for photophobia, pain, redness and visual disturbance.   Respiratory:  Negative for apnea, cough, choking, chest tightness, shortness of breath and wheezing.    Cardiovascular:  Negative for chest pain, palpitations and leg swelling.   Gastrointestinal:  Negative for abdominal distention, abdominal pain, blood in stool, constipation, diarrhea, nausea and vomiting.   Genitourinary:  Negative for difficulty urinating, dysuria, flank pain, frequency, hematuria and urgency.   Musculoskeletal:  Negative for arthralgias, back pain, gait problem, joint swelling, myalgias and neck pain.   Skin:  Negative for rash and wound.   Neurological:  Negative for dizziness, seizures, syncope, facial asymmetry, speech difficulty, weakness, numbness and headaches.   Psychiatric/Behavioral:  Negative for confusion, sleep disturbance and suicidal ideas. The patient is not nervous/anxious.        Objective   Component      Latest Ref Rng 8/4/2023   GLUCOSE      74 - 99 mg/dL 96    SODIUM      136 - 145 mmol/L 137    POTASSIUM      3.5 - 5.3 mmol/L 4.1    CHLORIDE      98 - 107 mmol/L 102    Bicarbonate      21 - 32 mmol/L 26    Anion Gap      10 - 20 mmol/L 13    Blood Urea Nitrogen      6 - 23 mg/dL 25 (H)    Creatinine      0.50 - 1.30 mg/dL 0.94    GFR MALE      >90 mL/min/1.73m2 >90    Calcium      8.6 - " 10.3 mg/dL 9.3    Albumin      3.4 - 5.0 g/dL 4.3    Alkaline Phosphatase      33 - 120 U/L 63    Total Protein      6.4 - 8.2 g/dL 6.8    AST      9 - 39 U/L 11    Bilirubin Total      0.0 - 1.2 mg/dL 0.6    ALT      10 - 52 U/L 18    Hemoglobin A1C      % 6.0 !    Estimated Average Glucose      MG/    Thyroid Stimulating Hormone      0.44 - 3.98 mIU/L 1.52       Legend:  (H) High  ! Abnormal  Physical Exam  Constitutional:       Appearance: Normal appearance. He is normal weight.   HENT:      Head: Normocephalic.   Eyes:      Extraocular Movements: Extraocular movements intact.      Conjunctiva/sclera: Conjunctivae normal.      Pupils: Pupils are equal, round, and reactive to light.   Cardiovascular:      Rate and Rhythm: Normal rate and regular rhythm.      Pulses: Normal pulses.      Heart sounds: Normal heart sounds.   Pulmonary:      Effort: Pulmonary effort is normal.      Breath sounds: Normal breath sounds.   Musculoskeletal:         General: Normal range of motion.      Cervical back: Normal range of motion.   Skin:     General: Skin is warm and dry.   Neurological:      General: No focal deficit present.      Mental Status: He is alert and oriented to person, place, and time.   Psychiatric:         Mood and Affect: Mood normal.         Behavior: Behavior normal.         Thought Content: Thought content normal.         Judgment: Judgment normal.            Assessment/Plan   Problem List Items Addressed This Visit       Chronic obstructive pulmonary disease (CMS/HCC)    Hypertension associated with diabetes (CMS/HCC) - Primary    Relevant Medications    tirzepatide (Mounjaro) 5 mg/0.5 mL pen injector    Other Relevant Orders    Comprehensive Metabolic Panel    CBC and Auto Differential    TSH with reflex to Free T4 if abnormal    Iron and TIBC    Hemoglobin A1C    Mixed hyperlipidemia due to type 2 diabetes mellitus (CMS/HCC)    Relevant Orders    Comprehensive Metabolic Panel    CBC and Auto  Differential    TSH with reflex to Free T4 if abnormal    Iron and TIBC    Hemoglobin A1C    Lipid Panel    Class 3 severe obesity due to excess calories with serious comorbidity and body mass index (BMI) of 45.0 to 49.9 in adult (CMS/[facility])    Compression fracture of fifth lumbar vertebra (CMS/[facility])    Screening PSA (prostate specific antigen)    Relevant Orders    Prostate Specific Antigen, Screen       WE DISCUSSED MOST COMMON SIDE EFFECTS OF PRESCRIBED MEDICATIONS. INDICATIONS, RISK, COMPLICATIONS, AND ALTERNATIVES OF MEDICATION/THERAPEUTICS WERE EXPLAINED AND DISCUSSED. PLEASE MONITOR CLOSELY FOR ANY UNTOWARD SIDE EFFECTS OR COMPLICATIONS OF MEDICATIONS. PATIENT IS STRONGLY ADVISED TO BE COMPLIANT WITH RECOMMENDATIONS. QUESTIONS AND CONCERNS WERE ADDRESSED. INSTRUCTED TO CALL, RETURN SOONER, OR GO TO THE ER,  IF SYMPTOMS PERSIST OR WORSEN. THEY VOICED UNDERSTANDING AND  DENIES FURTHER QUESTIONS AT THIS TIME.    TIME CODE  1. PREPARATION FOR PATIENT'S VISIT (REVIEWING CHART, CURRENT MEDICAL RECORDS, OUTSIDE HEALTH PROVIDER RECORDS, PREVIOUS HISTORY, EXAM, TEST, PROCEDURE, AND MEDICATIONS)  2. FACE TO FACE ENCOUNTER OBTAINING HISTORY FROM THE PATIENT/FAMILY/CAREGIVERS; PERFORMING EVALUATION AND EXAMINATION; ORDERING TESTS OR PROCEDURES; REFERRING AND COMMUNICATING WITH OTHER HEALTHCARE PROVIDERS; COUNSELING AND EDUCATION OF THE PATIENT/FAMILY/CAREGIVERS; INDEPENDENTLY INTERPRETING RESULTS (TESTS, LABS, PROCEDURES, IMAGING) AND COMMUNICATING AND EXPLAINING RESULTS TO THE PATIENT/FAMILY/CAREGIVERS  3. COORDINATION OF CARE; PREPARING AND PRINTING DISCHARGE INSTRUCTIONS AND ANY EDUCATIONAL MATERIAL FOR THE PATIENT/FAMILY/CAREGIVERS. DOCUMENTING CLINICAL INFORMATION IN THE ELECTRONIC MEDICAL RECORD   4. REVIEWING OARRS AS NEEDED    MDM  1) COMPLEXITY: MORE THAN 1 STABLE CHRONIC CONDITION ADDRESSED OR 1 ACUTE ILLNESS ADDRESSED   2)DATA: TESTS INTERPRETED AND OR ORDERED, TOOK INDEPENDENT HISTORY OR RECORDS  REVIEWED  3)RISK: MODERATE RISK DUE TO NATURE OF MEDICAL CONDITIONS/COMORBIDITY OR MEDICATIONS ORDERED OR SURGICAL OR PROCEDURE REFERRAL    6 MONTHS WITH LABS

## 2023-09-05 DIAGNOSIS — R94.39 ABNORMAL STRESS TEST: ICD-10-CM

## 2023-09-05 DIAGNOSIS — I65.23 BILATERAL CAROTID ARTERY STENOSIS: Primary | ICD-10-CM

## 2023-09-05 DIAGNOSIS — K21.9 GASTROESOPHAGEAL REFLUX DISEASE, UNSPECIFIED WHETHER ESOPHAGITIS PRESENT: ICD-10-CM

## 2023-09-06 RX ORDER — ASPIRIN 81 MG/1
81 TABLET ORAL DAILY
Qty: 90 TABLET | Refills: 3 | Status: SHIPPED | OUTPATIENT
Start: 2023-09-06

## 2023-09-06 RX ORDER — OMEPRAZOLE 40 MG/1
40 CAPSULE, DELAYED RELEASE ORAL DAILY
Qty: 90 CAPSULE | Refills: 3 | Status: SHIPPED | OUTPATIENT
Start: 2023-09-06

## 2023-12-22 ENCOUNTER — APPOINTMENT (OUTPATIENT)
Dept: PRIMARY CARE | Facility: CLINIC | Age: 53
End: 2023-12-22
Payer: COMMERCIAL

## 2023-12-26 ENCOUNTER — OFFICE VISIT (OUTPATIENT)
Dept: PRIMARY CARE | Facility: CLINIC | Age: 53
End: 2023-12-26
Payer: COMMERCIAL

## 2023-12-26 VITALS
HEART RATE: 73 BPM | DIASTOLIC BLOOD PRESSURE: 70 MMHG | HEIGHT: 74 IN | SYSTOLIC BLOOD PRESSURE: 107 MMHG | BODY MASS INDEX: 40.43 KG/M2 | WEIGHT: 315 LBS

## 2023-12-26 DIAGNOSIS — M51.36 NARROWING OF LUMBAR INTERVERTEBRAL DISC SPACE: ICD-10-CM

## 2023-12-26 DIAGNOSIS — M51.36 LUMBAR DEGENERATIVE DISC DISEASE: ICD-10-CM

## 2023-12-26 DIAGNOSIS — E66.01 CLASS 3 SEVERE OBESITY DUE TO EXCESS CALORIES WITH SERIOUS COMORBIDITY AND BODY MASS INDEX (BMI) OF 45.0 TO 49.9 IN ADULT (MULTI): ICD-10-CM

## 2023-12-26 DIAGNOSIS — M51.36 BULGING LUMBAR DISC: ICD-10-CM

## 2023-12-26 DIAGNOSIS — S32.050S COMPRESSION FRACTURE OF L5 VERTEBRA, SEQUELA: Primary | ICD-10-CM

## 2023-12-26 PROBLEM — M51.369 NARROWING OF LUMBAR INTERVERTEBRAL DISC SPACE: Status: ACTIVE | Noted: 2023-12-26

## 2023-12-26 PROBLEM — M51.369 BULGING LUMBAR DISC: Status: ACTIVE | Noted: 2023-12-26

## 2023-12-26 PROCEDURE — 3044F HG A1C LEVEL LT 7.0%: CPT | Performed by: NURSE PRACTITIONER

## 2023-12-26 PROCEDURE — 3008F BODY MASS INDEX DOCD: CPT | Performed by: NURSE PRACTITIONER

## 2023-12-26 PROCEDURE — 99214 OFFICE O/P EST MOD 30 MIN: CPT | Performed by: NURSE PRACTITIONER

## 2023-12-26 PROCEDURE — 3074F SYST BP LT 130 MM HG: CPT | Performed by: NURSE PRACTITIONER

## 2023-12-26 PROCEDURE — 4004F PT TOBACCO SCREEN RCVD TLK: CPT | Performed by: NURSE PRACTITIONER

## 2023-12-26 PROCEDURE — 3078F DIAST BP <80 MM HG: CPT | Performed by: NURSE PRACTITIONER

## 2023-12-26 PROCEDURE — 4010F ACE/ARB THERAPY RXD/TAKEN: CPT | Performed by: NURSE PRACTITIONER

## 2023-12-26 RX ORDER — NAPROXEN 500 MG/1
500 TABLET ORAL 2 TIMES DAILY PRN
Qty: 60 TABLET | Refills: 0 | Status: SHIPPED | OUTPATIENT
Start: 2023-12-26 | End: 2024-02-09 | Stop reason: SDUPTHER

## 2023-12-26 RX ORDER — HYDROCODONE BITARTRATE AND ACETAMINOPHEN 5; 325 MG/1; MG/1
1 TABLET ORAL EVERY 6 HOURS PRN
Qty: 20 TABLET | Refills: 0 | Status: SHIPPED | OUTPATIENT
Start: 2023-12-26 | End: 2023-12-31

## 2023-12-26 ASSESSMENT — ENCOUNTER SYMPTOMS
HEMATURIA: 0
BACK PAIN: 0
JOINT SWELLING: 0
SORE THROAT: 0
VOMITING: 0
SHORTNESS OF BREATH: 0
TROUBLE SWALLOWING: 0
NUMBNESS: 0
DIARRHEA: 0
FATIGUE: 0
EYE PAIN: 0
FACIAL ASYMMETRY: 0
UNEXPECTED WEIGHT CHANGE: 0
CONSTIPATION: 0
CONFUSION: 0
WHEEZING: 0
SPEECH DIFFICULTY: 0
CHOKING: 0
SLEEP DISTURBANCE: 0
EYE REDNESS: 0
FEVER: 0
PALPITATIONS: 0
DIZZINESS: 0
HEADACHES: 0
ABDOMINAL DISTENTION: 0
DYSURIA: 0
WOUND: 0
APNEA: 0
CHEST TIGHTNESS: 0
ABDOMINAL PAIN: 0
PHOTOPHOBIA: 0
WEAKNESS: 0
BLOOD IN STOOL: 0
COUGH: 0
ARTHRALGIAS: 0
SEIZURES: 0
MYALGIAS: 0
NERVOUS/ANXIOUS: 0
NAUSEA: 0
CHILLS: 0
DIFFICULTY URINATING: 0
FLANK PAIN: 0
NECK PAIN: 0
FREQUENCY: 0

## 2023-12-26 NOTE — PROGRESS NOTES
"Subjective   Patient ID: Boy Davis Jr. is a 53 y.o. male who presents for Follow-up (C/O B/L LEG WEAKNESS AND PAIN).      HPI:  Presents today for C/O B/L LEG WEAKNESS  X 4 DAYS modifying factors consists of HAS CHRONIC BACK PAIN. HE WAS SEEING DR. AYAN MARINELLI AND HIS ASSOCIATE FOR RIGHT SIDED BACK INJECTIONS. NEXT APPT WITH THEM IS IN MARCH 2024.  associated symptoms consist of NO BOWEL OR BLADDER SYMPTOMS. PAIN 8-9/10 SCALE. HARD TO SLEEP R/T PAIN  prior treatment consists of medication INJECTIONS        Visit Vitals  /70   Pulse 73   Ht 1.88 m (6' 2\")   Wt (!) 162 kg (358 lb)   BMI 45.96 kg/m²   Smoking Status Every Day   BSA 2.91 m²        Review of Systems   Constitutional:  Negative for chills, fatigue, fever and unexpected weight change.   HENT:  Negative for congestion, ear pain, sore throat and trouble swallowing.    Eyes:  Negative for photophobia, pain, redness and visual disturbance.   Respiratory:  Negative for apnea, cough, choking, chest tightness, shortness of breath and wheezing.    Cardiovascular:  Negative for chest pain, palpitations and leg swelling.   Gastrointestinal:  Negative for abdominal distention, abdominal pain, blood in stool, constipation, diarrhea, nausea and vomiting.   Genitourinary:  Negative for difficulty urinating, dysuria, flank pain, frequency, hematuria and urgency.   Musculoskeletal:  Negative for arthralgias, back pain, gait problem, joint swelling, myalgias and neck pain.   Skin:  Negative for rash and wound.   Neurological:  Negative for dizziness, seizures, syncope, facial asymmetry, speech difficulty, weakness, numbness and headaches.   Psychiatric/Behavioral:  Negative for confusion, sleep disturbance and suicidal ideas. The patient is not nervous/anxious.        Objective   Exam Information    Status Exam Begun Exam Ended   Final 6/21/2023 14:29 6/21/2023 15:51     Study Result    Narrative & Impression   Interpreted By:  MARY MIR MD  MRN: " [id number]  Patient Name: MIRYAM CHRISTIANSEN     STUDY:  MR L-SPINE WO/W CONTRAST;  6/21/2023 3:51 pm     INDICATION:  COMPRESSION FX L5.     COMPARISON:  Lumbar spine radiograph 06/13/2023 CT abdomen and pelvis 08/09/2011     ACCESSION NUMBER(S):  [id number]     ORDERING CLINICIAN:  ERIC BARNARD     TECHNIQUE:  Sagittal T1, T2, STIR, axial T1 and axial T2 weighted images were  acquired through the lumbar spine.     FINDINGS:  LUMBAR SPINE:     Alignment: Straightening of the lumbar lordosis. Mild levoscoliosis.     Vertebrae/Intervertebral Discs: Mild compression deformity L4  vertebral body likely secondary to Schmorl's nodes herniation and  stable since the CT abdomen and pelvis from 08/09/2011. The remainder  of the vertebral body heights are within normal limits. No evidence  of a new compression deformity.Heterogenous marrow signal, without  suspicious replacement. Mild multilevel loss of disc space, most  prominent at L3-L4.     Conus: The lower thoracic cord appears unremarkable. The conus  terminates at L1.     Multilevel degenerative changes including disc bulges, endplate  remodeling, Schmorl's nodes, ligamentum flavum hypertrophy and facet  degenerative changes.     T12-L1: Disc bulge slightly asymmetric to the left, ligamentum flavum  hypertrophy and facet degenerative changes without significant canal  stenosis. Neural foramina are patent.     L1-2: Circumferential disc bulge, mild ligamentum flavum hypertrophy  and facet degenerative changes without significant canal stenosis.  Neural foramina are patent.     L2-3: Circumferential disc bulge, ligamentum flavum hypertrophy and  facet degenerative change with flattening of the ventral thecal sac.  There is no significant central canal or neural foraminal stenosis.     L3-4: Disc bulge asymmetric to the right, ligamentum flavum  hypertrophy and facet degenerative changes with mild canal stenosis.  Neural foramina are patent.     L4-5: Disc bulge asymmetric to the  left, ligamentum flavum  hypertrophy and facet degenerative changes with mild canal stenosis.  Left subarticular recess narrowing. Mild left neural foraminal  narrowing. Right neural foramen is patent.     L5-S1: Small disc protrusion, left-greater-than-right facet  degenerative changes and mild ligamentum flavum hypertrophy without  significant canal stenosis or neural foraminal narrowing .     Mild fatty atrophy of the paraspinal musculature.     IMPRESSION:  1.  Stable L4 compression deformity, since 2011. No new fracture,  traumatic malalignment or compression deformity.  2. Multilevel degenerative changes, most prominent at L3-L4 and  L4-L5, without high-grade canal stenosis or neural foraminal  narrowing.     Physical Exam  Constitutional:       Appearance: Normal appearance. He is normal weight.   HENT:      Head: Normocephalic.   Eyes:      Extraocular Movements: Extraocular movements intact.      Conjunctiva/sclera: Conjunctivae normal.      Pupils: Pupils are equal, round, and reactive to light.   Cardiovascular:      Rate and Rhythm: Normal rate and regular rhythm.      Pulses: Normal pulses.      Heart sounds: Normal heart sounds.   Pulmonary:      Effort: Pulmonary effort is normal.      Breath sounds: Normal breath sounds.   Musculoskeletal:         General: Normal range of motion.      Cervical back: Normal range of motion.   Skin:     General: Skin is warm and dry.   Neurological:      General: No focal deficit present.      Mental Status: He is alert and oriented to person, place, and time.   Psychiatric:         Mood and Affect: Mood normal.         Behavior: Behavior normal.         Thought Content: Thought content normal.         Judgment: Judgment normal.            Assessment/Plan   Problem List Items Addressed This Visit       Class 3 severe obesity due to excess calories with serious comorbidity and body mass index (BMI) of 45.0 to 49.9 in adult (CMS/[facility])    Compression fracture of fifth  lumbar vertebra (CMS/[facility]) - Primary    Relevant Orders    Referral to Neurosurgery    Lumbar degenerative disc disease    Relevant Orders    Referral to Neurosurgery    Narrowing of lumbar intervertebral disc space    Relevant Orders    Referral to Neurosurgery    Bulging lumbar disc     I HAVE REVIEWED THE OARRS, WHICH WAS APPROPRIATE. I HAVE EVALUATED THE RISKS OF ABUSE, ADDICTION, DIVERSION, AND DEPENDENCE. PRESCRIBED MEDICATION SEEMS APPROPRIATE FOR DOCUMENTED DIAGNOSIS. No med agreement of utox collected rt temp med     WE DISCUSSED MOST COMMON SIDE EFFECTS OF PRESCRIBED MEDICATIONS. INDICATIONS, RISK, COMPLICATIONS, AND ALTERNATIVES OF MEDICATION/THERAPEUTICS WERE EXPLAINED AND DISCUSSED. PLEASE MONITOR CLOSELY FOR ANY UNTOWARD SIDE EFFECTS OR COMPLICATIONS OF MEDICATIONS. PATIENT IS STRONGLY ADVISED TO BE COMPLIANT WITH RECOMMENDATIONS. QUESTIONS AND CONCERNS WERE ADDRESSED. INSTRUCTED TO CALL, RETURN SOONER, OR GO TO THE ER,  IF SYMPTOMS PERSIST OR WORSEN. THEY VOICED UNDERSTANDING AND  DENIES FURTHER QUESTIONS AT THIS TIME.      TIME CODE  1. PREPARATION FOR PATIENT'S VISIT (REVIEWING CHART, CURRENT MEDICAL RECORDS, OUTSIDE HEALTH PROVIDER RECORDS, PREVIOUS HISTORY, EXAM, TEST, PROCEDURE, AND MEDICATIONS)  2. FACE TO FACE ENCOUNTER OBTAINING HISTORY FROM THE PATIENT/FAMILY/CAREGIVERS; PERFORMING EVALUATION AND EXAMINATION; ORDERING TESTS OR PROCEDURES; REFERRING AND COMMUNICATING WITH OTHER HEALTHCARE PROVIDERS; COUNSELING AND EDUCATION OF THE PATIENT/FAMILY/CAREGIVERS; INDEPENDENTLY INTERPRETING RESULTS (TESTS, LABS, PROCEDURES, IMAGING) AND COMMUNICATING AND EXPLAINING RESULTS TO THE PATIENT/FAMILY/CAREGIVERS  3. COORDINATION OF CARE; PREPARING AND PRINTING DISCHARGE INSTRUCTIONS AND ANY EDUCATIONAL MATERIAL FOR THE PATIENT/FAMILY/CAREGIVERS. DOCUMENTING CLINICAL INFORMATION IN THE ELECTRONIC MEDICAL RECORD   4. REVIEWING OARRS AS NEEDED    MDM  1) COMPLEXITY: MORE THAN 1 STABLE CHRONIC CONDITION  ADDRESSED OR 1 ACUTE ILLNESS ADDRESSED   2)DATA: TESTS INTERPRETED AND OR ORDERED, TOOK INDEPENDENT HISTORY OR RECORDS REVIEWED  3)RISK: MODERATE RISK DUE TO NATURE OF MEDICAL CONDITIONS/COMORBIDITY OR MEDICATIONS ORDERED OR SURGICAL OR PROCEDURE REFERRAL    Follow up as before

## 2023-12-26 NOTE — LETTER
December 26, 2023     Patient: Boy Davis Jr.   YOB: 1970   Date of Visit: 12/26/2023       To Whom It May Concern:    Boy Davis was seen in my clinic on 12/26/2023.Please excuse Boy for his absence from work on 12/27/23 and 12/28/23. He may return to work on 12/29/23. If you have any questions or concerns, please don't hesitate to call.         Sincerely,         Nat Bryant, APRN-CNP        CC: No Recipients

## 2024-01-08 ENCOUNTER — APPOINTMENT (OUTPATIENT)
Dept: NEUROSURGERY | Facility: CLINIC | Age: 54
End: 2024-01-08
Payer: COMMERCIAL

## 2024-01-12 NOTE — PROGRESS NOTES
NPV - Referred by Dr. Nat Bryant bilateral leg weakness and chronic back pain. Has had injections in the past. Was unable to perform physical therapy due to being sorer after physical therapy. MRI done 6/21/23. Xray 8/30/23.    Boy Davis Jr. is a 53 y.o. year old male    HPI  Mr. Davis is a 53-year-old gentleman who has had hip pain that is laterally per patient's last year with radiation to the anterior thigh.  There is no radiating pain down his legs posteriorly.  Patient denied difficulty walking due to pain in his legs.  He is referred for further evaluation after the MRI from last year.    No weakness, bowel, or bladder dysfunction.  He has gone to physical therapy and had prior injections, with minimal improvement.  Review of Systems       Past Medical History:   Diagnosis Date    Encounter for screening for malignant neoplasm of prostate 12/04/2020    Screening PSA (prostate specific antigen)    Immunization not carried out because of patient refusal     Influenza vaccination declined       Past Surgical History:   Procedure Laterality Date    APPENDECTOMY      CARDIAC CATHETERIZATION  09/23/2020    CARDIAC CATHETERIZATION  04/2023    CHOLECYSTECTOMY      COLONOSCOPY  07/2014    DR. GONZALEZ - REPEAT 3 YEARS. H/O POLYP, PRE-CANCER CELLS    TONSILLECTOMY             Current Outpatient Medications:     albuterol 90 mcg/actuation inhaler, Inhale 2 puffs every 6 hours if needed for wheezing or shortness of breath., Disp: 18 g, Rfl: 11    aspirin 81 mg EC tablet, Take 1 tablet (81 mg) by mouth once daily., Disp: 90 tablet, Rfl: 3    atorvastatin (Lipitor) 40 mg tablet, Take 1 tablet (40 mg) by mouth once daily at bedtime., Disp: , Rfl:     chlorthalidone (Hygroton) 25 mg tablet, Take 1 tablet (25 mg) by mouth once daily., Disp: , Rfl:     clotrimazole (Lotrimin) 1 % cream, Apply 1 Application topically 2 times a day., Disp: , Rfl:     cyclobenzaprine (Flexeril) 10 mg tablet, Take 1 tablet (10 mg) by mouth 3  times a day as needed for muscle spasms., Disp: 90 tablet, Rfl: 0    fluticasone-umeclidin-vilanter (TRELEGY-ELLIPTA) 100-62.5-25 mcg blister with device, Inhale 1 puff once daily., Disp: 3 each, Rfl: 3    ipratropium-albuteroL (Duo-Neb) 0.5-2.5 mg/3 mL nebulizer solution, Take 3 mL by nebulization 4 times a day., Disp: , Rfl:     lisinopril 10 mg tablet, Take 1 tablet (10 mg) by mouth once daily., Disp: , Rfl:     metoprolol succinate XL (Toprol-XL) 25 mg 24 hr tablet, Take 1 tablet (25 mg) by mouth once daily. Do not crush or chew., Disp: 90 tablet, Rfl: 3    naproxen (Naprosyn) 500 mg tablet, Take 1 tablet (500 mg) by mouth 2 times a day as needed for mild pain (1 - 3) (pain)., Disp: 60 tablet, Rfl: 0    NIFEdipine CC (Adalat CC) 30 mg 24 hr tablet, Take 1 tablet (30 mg) by mouth once daily., Disp: , Rfl:     omeprazole (PriLOSEC) 40 mg DR capsule, Take 1 capsule (40 mg) by mouth once daily., Disp: 90 capsule, Rfl: 3    tirzepatide (Mounjaro) 5 mg/0.5 mL pen injector, Inject 5 mg under the skin 1 (one) time per week., Disp: 2 mL, Rfl: 5    imiquimod (Aldara) 5 % cream, Apply 1 packet topically once daily., Disp: , Rfl:         Objective   Vitals:    01/16/24 1037   BP: 120/80   Pulse: 78   Resp: 16   Temp: 35.9 °C (96.6 °F)   SpO2: 98%       Neurological Exam  AAO x 3  PERRL, EOMI, TS, TML  5/5  Senorsy intact  No drift      [unfilled]  MR lumbar spine w and wo IV contrast  Status: Final result     PACS Images     Show images for MR lumbar spine w and wo IV contrast  Signed by    Signed Time Phone Pager   Juan F Mir MD 6/21/2023 16:07 [phone]      Exam Information    Status Exam Begun Exam Ended   Final 6/21/2023 14:29 6/21/2023 15:51     Study Result    Narrative & Impression   Interpreted By:  JUAN F MIR MD  MRN: [id number]  Patient Name: MIRYAM CHRISTIANSEN     STUDY:  MR L-SPINE WO/W CONTRAST;  6/21/2023 3:51 pm     INDICATION:  COMPRESSION FX L5.     COMPARISON:  Lumbar spine radiograph 06/13/2023  CT abdomen and pelvis 08/09/2011     ACCESSION NUMBER(S):  [id number]     ORDERING CLINICIAN:  ERIC BARNARD     TECHNIQUE:  Sagittal T1, T2, STIR, axial T1 and axial T2 weighted images were  acquired through the lumbar spine.     FINDINGS:  LUMBAR SPINE:     Alignment: Straightening of the lumbar lordosis. Mild levoscoliosis.     Vertebrae/Intervertebral Discs: Mild compression deformity L4  vertebral body likely secondary to Schmorl's nodes herniation and  stable since the CT abdomen and pelvis from 08/09/2011. The remainder  of the vertebral body heights are within normal limits. No evidence  of a new compression deformity.Heterogenous marrow signal, without  suspicious replacement. Mild multilevel loss of disc space, most  prominent at L3-L4.     Conus: The lower thoracic cord appears unremarkable. The conus  terminates at L1.     Multilevel degenerative changes including disc bulges, endplate  remodeling, Schmorl's nodes, ligamentum flavum hypertrophy and facet  degenerative changes.     T12-L1: Disc bulge slightly asymmetric to the left, ligamentum flavum  hypertrophy and facet degenerative changes without significant canal  stenosis. Neural foramina are patent.     L1-2: Circumferential disc bulge, mild ligamentum flavum hypertrophy  and facet degenerative changes without significant canal stenosis.  Neural foramina are patent.     L2-3: Circumferential disc bulge, ligamentum flavum hypertrophy and  facet degenerative change with flattening of the ventral thecal sac.  There is no significant central canal or neural foraminal stenosis.     L3-4: Disc bulge asymmetric to the right, ligamentum flavum  hypertrophy and facet degenerative changes with mild canal stenosis.  Neural foramina are patent.     L4-5: Disc bulge asymmetric to the left, ligamentum flavum  hypertrophy and facet degenerative changes with mild canal stenosis.  Left subarticular recess narrowing. Mild left neural foraminal  narrowing. Right neural  foramen is patent.     L5-S1: Small disc protrusion, left-greater-than-right facet  degenerative changes and mild ligamentum flavum hypertrophy without  significant canal stenosis or neural foraminal narrowing .     Mild fatty atrophy of the paraspinal musculature.     IMPRESSION:  1.  Stable L4 compression deformity, since 2011. No new fracture,  traumatic malalignment or compression deformity.  2. Multilevel degenerative changes, most prominent at L3-L4 and  L4-L5, without high-grade canal stenosis or neural foraminal  narrowing.           Assessment/Plan       I had the pleasure of seeing Mr. Davis and his wife I had a thorough discussion as well as reviewed his MRI lumbar spine together.  His symptoms not consistent with neurogenic claudication or radiculopathy.  The MRI showed no severe canal stenosis or foraminal stenosis to suggest this is a lumbar spine pathology that is causing his current symptoms.  All question was answered to his satisfaction.

## 2024-01-16 ENCOUNTER — OFFICE VISIT (OUTPATIENT)
Dept: NEUROSURGERY | Facility: CLINIC | Age: 54
End: 2024-01-16
Payer: COMMERCIAL

## 2024-01-16 VITALS
TEMPERATURE: 96.6 F | SYSTOLIC BLOOD PRESSURE: 120 MMHG | DIASTOLIC BLOOD PRESSURE: 80 MMHG | HEART RATE: 78 BPM | RESPIRATION RATE: 16 BRPM | OXYGEN SATURATION: 98 % | BODY MASS INDEX: 40.43 KG/M2 | HEIGHT: 74 IN | WEIGHT: 315 LBS

## 2024-01-16 DIAGNOSIS — M51.36 LUMBAR DEGENERATIVE DISC DISEASE: ICD-10-CM

## 2024-01-16 DIAGNOSIS — M51.36 NARROWING OF LUMBAR INTERVERTEBRAL DISC SPACE: ICD-10-CM

## 2024-01-16 DIAGNOSIS — S32.050S COMPRESSION FRACTURE OF L5 VERTEBRA, SEQUELA: ICD-10-CM

## 2024-01-16 PROCEDURE — 99212 OFFICE O/P EST SF 10 MIN: CPT | Performed by: NEUROLOGICAL SURGERY

## 2024-01-16 PROCEDURE — 3008F BODY MASS INDEX DOCD: CPT | Performed by: NEUROLOGICAL SURGERY

## 2024-01-16 PROCEDURE — 3079F DIAST BP 80-89 MM HG: CPT | Performed by: NEUROLOGICAL SURGERY

## 2024-01-16 PROCEDURE — 3074F SYST BP LT 130 MM HG: CPT | Performed by: NEUROLOGICAL SURGERY

## 2024-01-16 PROCEDURE — 4010F ACE/ARB THERAPY RXD/TAKEN: CPT | Performed by: NEUROLOGICAL SURGERY

## 2024-01-22 DIAGNOSIS — E11.59 HYPERTENSION ASSOCIATED WITH DIABETES (MULTI): ICD-10-CM

## 2024-01-22 DIAGNOSIS — I15.2 HYPERTENSION ASSOCIATED WITH DIABETES (MULTI): ICD-10-CM

## 2024-01-23 RX ORDER — TIRZEPATIDE 5 MG/.5ML
5 INJECTION, SOLUTION SUBCUTANEOUS
Qty: 2 ML | Refills: 5 | Status: SHIPPED | OUTPATIENT
Start: 2024-01-23 | End: 2024-02-09 | Stop reason: SDUPTHER

## 2024-02-06 ENCOUNTER — LAB (OUTPATIENT)
Dept: LAB | Facility: LAB | Age: 54
End: 2024-02-06
Payer: COMMERCIAL

## 2024-02-06 DIAGNOSIS — E11.59 HYPERTENSION ASSOCIATED WITH DIABETES (MULTI): ICD-10-CM

## 2024-02-06 DIAGNOSIS — I15.2 HYPERTENSION ASSOCIATED WITH DIABETES (MULTI): ICD-10-CM

## 2024-02-06 DIAGNOSIS — E11.69 MIXED HYPERLIPIDEMIA DUE TO TYPE 2 DIABETES MELLITUS (MULTI): ICD-10-CM

## 2024-02-06 DIAGNOSIS — E78.2 MIXED HYPERLIPIDEMIA DUE TO TYPE 2 DIABETES MELLITUS (MULTI): ICD-10-CM

## 2024-02-06 DIAGNOSIS — Z12.5 SCREENING PSA (PROSTATE SPECIFIC ANTIGEN): ICD-10-CM

## 2024-02-06 LAB
ALBUMIN SERPL BCP-MCNC: 4.3 G/DL (ref 3.4–5)
ALP SERPL-CCNC: 67 U/L (ref 33–120)
ALT SERPL W P-5'-P-CCNC: 14 U/L (ref 10–52)
ANION GAP SERPL CALC-SCNC: 11 MMOL/L (ref 10–20)
AST SERPL W P-5'-P-CCNC: 10 U/L (ref 9–39)
BASOPHILS # BLD AUTO: 0.09 X10*3/UL (ref 0–0.1)
BASOPHILS NFR BLD AUTO: 1.3 %
BILIRUB SERPL-MCNC: 0.5 MG/DL (ref 0–1.2)
BUN SERPL-MCNC: 20 MG/DL (ref 6–23)
CALCIUM SERPL-MCNC: 9.7 MG/DL (ref 8.6–10.3)
CHLORIDE SERPL-SCNC: 100 MMOL/L (ref 98–107)
CHOLEST SERPL-MCNC: 126 MG/DL (ref 0–199)
CHOLESTEROL/HDL RATIO: 3.5
CO2 SERPL-SCNC: 32 MMOL/L (ref 21–32)
CREAT SERPL-MCNC: 0.94 MG/DL (ref 0.5–1.3)
EGFRCR SERPLBLD CKD-EPI 2021: >90 ML/MIN/1.73M*2
EOSINOPHIL # BLD AUTO: 0.24 X10*3/UL (ref 0–0.7)
EOSINOPHIL NFR BLD AUTO: 3.6 %
ERYTHROCYTE [DISTWIDTH] IN BLOOD BY AUTOMATED COUNT: 16.6 % (ref 11.5–14.5)
EST. AVERAGE GLUCOSE BLD GHB EST-MCNC: 114 MG/DL
GLUCOSE SERPL-MCNC: 100 MG/DL (ref 74–99)
HBA1C MFR BLD: 5.6 %
HCT VFR BLD AUTO: 46.6 % (ref 41–52)
HDLC SERPL-MCNC: 36 MG/DL
HGB BLD-MCNC: 14.9 G/DL (ref 13.5–17.5)
IMM GRANULOCYTES # BLD AUTO: 0.01 X10*3/UL (ref 0–0.7)
IMM GRANULOCYTES NFR BLD AUTO: 0.1 % (ref 0–0.9)
IRON SATN MFR SERPL: 18 % (ref 25–45)
IRON SERPL-MCNC: 58 UG/DL (ref 35–150)
LDLC SERPL CALC-MCNC: 66 MG/DL
LYMPHOCYTES # BLD AUTO: 1.86 X10*3/UL (ref 1.2–4.8)
LYMPHOCYTES NFR BLD AUTO: 27.8 %
MCH RBC QN AUTO: 26.4 PG (ref 26–34)
MCHC RBC AUTO-ENTMCNC: 32 G/DL (ref 32–36)
MCV RBC AUTO: 83 FL (ref 80–100)
MONOCYTES # BLD AUTO: 0.44 X10*3/UL (ref 0.1–1)
MONOCYTES NFR BLD AUTO: 6.6 %
NEUTROPHILS # BLD AUTO: 4.04 X10*3/UL (ref 1.2–7.7)
NEUTROPHILS NFR BLD AUTO: 60.6 %
NON HDL CHOLESTEROL: 90 MG/DL (ref 0–149)
NRBC BLD-RTO: 0 /100 WBCS (ref 0–0)
PLATELET # BLD AUTO: 391 X10*3/UL (ref 150–450)
POTASSIUM SERPL-SCNC: 4.2 MMOL/L (ref 3.5–5.3)
PROT SERPL-MCNC: 6.9 G/DL (ref 6.4–8.2)
PSA SERPL-MCNC: 1.21 NG/ML
RBC # BLD AUTO: 5.65 X10*6/UL (ref 4.5–5.9)
SODIUM SERPL-SCNC: 139 MMOL/L (ref 136–145)
TIBC SERPL-MCNC: 323 UG/DL (ref 240–445)
TRIGL SERPL-MCNC: 119 MG/DL (ref 0–149)
TSH SERPL-ACNC: 1.52 MIU/L (ref 0.44–3.98)
UIBC SERPL-MCNC: 265 UG/DL (ref 110–370)
VLDL: 24 MG/DL (ref 0–40)
WBC # BLD AUTO: 6.7 X10*3/UL (ref 4.4–11.3)

## 2024-02-06 PROCEDURE — 80061 LIPID PANEL: CPT

## 2024-02-06 PROCEDURE — 84153 ASSAY OF PSA TOTAL: CPT

## 2024-02-06 PROCEDURE — 84443 ASSAY THYROID STIM HORMONE: CPT

## 2024-02-06 PROCEDURE — 36415 COLL VENOUS BLD VENIPUNCTURE: CPT

## 2024-02-06 PROCEDURE — 83036 HEMOGLOBIN GLYCOSYLATED A1C: CPT

## 2024-02-06 PROCEDURE — 83550 IRON BINDING TEST: CPT

## 2024-02-06 PROCEDURE — 83540 ASSAY OF IRON: CPT

## 2024-02-06 PROCEDURE — 80053 COMPREHEN METABOLIC PANEL: CPT

## 2024-02-06 PROCEDURE — 85025 COMPLETE CBC W/AUTO DIFF WBC: CPT

## 2024-02-09 ENCOUNTER — OFFICE VISIT (OUTPATIENT)
Dept: PRIMARY CARE | Facility: CLINIC | Age: 54
End: 2024-02-09
Payer: COMMERCIAL

## 2024-02-09 VITALS
HEART RATE: 88 BPM | DIASTOLIC BLOOD PRESSURE: 88 MMHG | SYSTOLIC BLOOD PRESSURE: 128 MMHG | BODY MASS INDEX: 40.43 KG/M2 | HEIGHT: 74 IN | WEIGHT: 315 LBS

## 2024-02-09 DIAGNOSIS — S32.050A COMPRESSION FRACTURE OF L5 VERTEBRA, INITIAL ENCOUNTER (MULTI): ICD-10-CM

## 2024-02-09 DIAGNOSIS — E66.01 CLASS 3 SEVERE OBESITY DUE TO EXCESS CALORIES WITH SERIOUS COMORBIDITY AND BODY MASS INDEX (BMI) OF 40.0 TO 44.9 IN ADULT (MULTI): Primary | ICD-10-CM

## 2024-02-09 DIAGNOSIS — I15.2 HYPERTENSION ASSOCIATED WITH DIABETES (MULTI): ICD-10-CM

## 2024-02-09 DIAGNOSIS — E11.69 MIXED HYPERLIPIDEMIA DUE TO TYPE 2 DIABETES MELLITUS (MULTI): ICD-10-CM

## 2024-02-09 DIAGNOSIS — M54.50 LOW BACK PAIN RADIATING TO RIGHT LEG: ICD-10-CM

## 2024-02-09 DIAGNOSIS — M79.604 LOW BACK PAIN RADIATING TO RIGHT LEG: ICD-10-CM

## 2024-02-09 DIAGNOSIS — J44.9 CHRONIC OBSTRUCTIVE PULMONARY DISEASE, UNSPECIFIED COPD TYPE (MULTI): ICD-10-CM

## 2024-02-09 DIAGNOSIS — M51.36 LUMBAR DEGENERATIVE DISC DISEASE: ICD-10-CM

## 2024-02-09 DIAGNOSIS — I48.21 PERMANENT ATRIAL FIBRILLATION (MULTI): ICD-10-CM

## 2024-02-09 DIAGNOSIS — E78.2 MIXED HYPERLIPIDEMIA DUE TO TYPE 2 DIABETES MELLITUS (MULTI): ICD-10-CM

## 2024-02-09 DIAGNOSIS — E11.59 HYPERTENSION ASSOCIATED WITH DIABETES (MULTI): ICD-10-CM

## 2024-02-09 PROBLEM — Z09 HOSPITAL DISCHARGE FOLLOW-UP: Status: RESOLVED | Noted: 2023-04-19 | Resolved: 2024-02-09

## 2024-02-09 PROCEDURE — 3079F DIAST BP 80-89 MM HG: CPT | Performed by: NURSE PRACTITIONER

## 2024-02-09 PROCEDURE — 4010F ACE/ARB THERAPY RXD/TAKEN: CPT | Performed by: NURSE PRACTITIONER

## 2024-02-09 PROCEDURE — 99214 OFFICE O/P EST MOD 30 MIN: CPT | Performed by: NURSE PRACTITIONER

## 2024-02-09 PROCEDURE — 3044F HG A1C LEVEL LT 7.0%: CPT | Performed by: NURSE PRACTITIONER

## 2024-02-09 PROCEDURE — 3074F SYST BP LT 130 MM HG: CPT | Performed by: NURSE PRACTITIONER

## 2024-02-09 PROCEDURE — 3008F BODY MASS INDEX DOCD: CPT | Performed by: NURSE PRACTITIONER

## 2024-02-09 PROCEDURE — 3048F LDL-C <100 MG/DL: CPT | Performed by: NURSE PRACTITIONER

## 2024-02-09 RX ORDER — LISINOPRIL 10 MG/1
10 TABLET ORAL DAILY
Qty: 90 TABLET | Refills: 3 | Status: SHIPPED | OUTPATIENT
Start: 2024-02-09 | End: 2024-06-11 | Stop reason: ALTCHOICE

## 2024-02-09 RX ORDER — CHLORTHALIDONE 25 MG/1
25 TABLET ORAL DAILY
Qty: 90 TABLET | Refills: 3 | Status: SHIPPED | OUTPATIENT
Start: 2024-02-09

## 2024-02-09 RX ORDER — NIFEDIPINE 30 MG/1
30 TABLET, FILM COATED, EXTENDED RELEASE ORAL DAILY
Qty: 90 TABLET | Refills: 3 | Status: SHIPPED | OUTPATIENT
Start: 2024-02-09

## 2024-02-09 RX ORDER — ATORVASTATIN CALCIUM 40 MG/1
40 TABLET, FILM COATED ORAL NIGHTLY
Qty: 90 TABLET | Refills: 3 | Status: SHIPPED | OUTPATIENT
Start: 2024-02-09

## 2024-02-09 RX ORDER — TIRZEPATIDE 5 MG/.5ML
5 INJECTION, SOLUTION SUBCUTANEOUS
Qty: 2 ML | Refills: 5 | Status: SHIPPED | OUTPATIENT
Start: 2024-02-09

## 2024-02-09 RX ORDER — NAPROXEN 500 MG/1
500 TABLET ORAL 2 TIMES DAILY PRN
Qty: 60 TABLET | Refills: 5 | Status: SHIPPED | OUTPATIENT
Start: 2024-02-09 | End: 2024-03-20 | Stop reason: ALTCHOICE

## 2024-02-09 RX ORDER — CYCLOBENZAPRINE HCL 10 MG
10 TABLET ORAL 3 TIMES DAILY PRN
Qty: 90 TABLET | Refills: 3 | Status: SHIPPED | OUTPATIENT
Start: 2024-02-09

## 2024-02-09 RX ORDER — HYDROCODONE BITARTRATE AND ACETAMINOPHEN 5; 325 MG/1; MG/1
TABLET ORAL
COMMUNITY
End: 2024-02-09 | Stop reason: ALTCHOICE

## 2024-02-09 ASSESSMENT — ENCOUNTER SYMPTOMS
FATIGUE: 0
FACIAL ASYMMETRY: 0
FLANK PAIN: 0
FEVER: 0
TROUBLE SWALLOWING: 0
PALPITATIONS: 0
DYSURIA: 0
BLOOD IN STOOL: 0
FREQUENCY: 0
EYE REDNESS: 0
NUMBNESS: 0
MYALGIAS: 0
BACK PAIN: 0
SPEECH DIFFICULTY: 0
PHOTOPHOBIA: 0
SHORTNESS OF BREATH: 0
DIFFICULTY URINATING: 0
HEADACHES: 0
VOMITING: 0
CONFUSION: 0
CHEST TIGHTNESS: 0
CONSTIPATION: 0
WEAKNESS: 0
CHILLS: 0
WHEEZING: 0
NERVOUS/ANXIOUS: 0
WOUND: 0
SORE THROAT: 0
DIZZINESS: 0
DIARRHEA: 0
HEMATURIA: 0
APNEA: 0
SLEEP DISTURBANCE: 0
ABDOMINAL DISTENTION: 0
CHOKING: 0
UNEXPECTED WEIGHT CHANGE: 0
NAUSEA: 0
ABDOMINAL PAIN: 0
ARTHRALGIAS: 0
COUGH: 0
EYE PAIN: 0
JOINT SWELLING: 0
NECK PAIN: 0
SEIZURES: 0

## 2024-02-09 ASSESSMENT — PATIENT HEALTH QUESTIONNAIRE - PHQ9
2. FEELING DOWN, DEPRESSED OR HOPELESS: NOT AT ALL
SUM OF ALL RESPONSES TO PHQ9 QUESTIONS 1 AND 2: 0
1. LITTLE INTEREST OR PLEASURE IN DOING THINGS: NOT AT ALL

## 2024-02-09 NOTE — PROGRESS NOTES
"Subjective   Patient ID: Boy Davis Jr. is a 53 y.o. male who presents for Follow-up (6 MONTHS LABS; NO CONCERNS).      HPI:  Presents today for 6 MONTHS WITH LABS. NO NEW COMPLAINTS       DM- STABLE  LIPIDS- STABLE  HTN- STABLE    Visit Vitals  /88 (BP Location: Right arm, Patient Position: Sitting)   Pulse 88   Ht 1.88 m (6' 2\")   Wt (!) 150 kg (331 lb)   BMI 42.50 kg/m²   Smoking Status Every Day   BSA 2.8 m²        Review of Systems   Constitutional:  Negative for chills, fatigue, fever and unexpected weight change.   HENT:  Negative for congestion, ear pain, sore throat and trouble swallowing.    Eyes:  Negative for photophobia, pain, redness and visual disturbance.   Respiratory:  Negative for apnea, cough, choking, chest tightness, shortness of breath and wheezing.    Cardiovascular:  Negative for chest pain, palpitations and leg swelling.   Gastrointestinal:  Negative for abdominal distention, abdominal pain, blood in stool, constipation, diarrhea, nausea and vomiting.   Genitourinary:  Negative for difficulty urinating, dysuria, flank pain, frequency, hematuria and urgency.   Musculoskeletal:  Negative for arthralgias, back pain, gait problem, joint swelling, myalgias and neck pain.   Skin:  Negative for rash and wound.   Neurological:  Negative for dizziness, seizures, syncope, facial asymmetry, speech difficulty, weakness, numbness and headaches.   Psychiatric/Behavioral:  Negative for confusion, sleep disturbance and suicidal ideas. The patient is not nervous/anxious.        Objective   Component      Latest Ref Rng 2/6/2024   WBC      4.4 - 11.3 x10*3/uL 6.7    nRBC      0.0 - 0.0 /100 WBCs 0.0    RBC      4.50 - 5.90 x10*6/uL 5.65    HEMOGLOBIN      13.5 - 17.5 g/dL 14.9    HEMATOCRIT      41.0 - 52.0 % 46.6    MCV      80 - 100 fL 83    MCH      26.0 - 34.0 pg 26.4    MCHC      32.0 - 36.0 g/dL 32.0    RED CELL DISTRIBUTION WIDTH      11.5 - 14.5 % 16.6 (H)    Platelets      150 - 450 " x10*3/uL 391    Neutrophils %      40.0 - 80.0 % 60.6    Immature Granulocytes %, Automated      0.0 - 0.9 % 0.1    Lymphocytes %      13.0 - 44.0 % 27.8    Monocytes %      2.0 - 10.0 % 6.6    Eosinophils %      0.0 - 6.0 % 3.6    Basophils %      0.0 - 2.0 % 1.3    Neutrophils Absolute      1.20 - 7.70 x10*3/uL 4.04    Immature Granulocytes Absolute, Automated      0.00 - 0.70 x10*3/uL 0.01    Lymphocytes Absolute      1.20 - 4.80 x10*3/uL 1.86    Monocytes Absolute      0.10 - 1.00 x10*3/uL 0.44    Eosinophils Absolute      0.00 - 0.70 x10*3/uL 0.24    Basophils Absolute      0.00 - 0.10 x10*3/uL 0.09    GLUCOSE      74 - 99 mg/dL 100 (H)    SODIUM      136 - 145 mmol/L 139    POTASSIUM      3.5 - 5.3 mmol/L 4.2    CHLORIDE      98 - 107 mmol/L 100    Bicarbonate      21 - 32 mmol/L 32    Anion Gap      10 - 20 mmol/L 11    Blood Urea Nitrogen      6 - 23 mg/dL 20    Creatinine      0.50 - 1.30 mg/dL 0.94    EGFR      >60 mL/min/1.73m*2 >90    Calcium      8.6 - 10.3 mg/dL 9.7    Albumin      3.4 - 5.0 g/dL 4.3    Alkaline Phosphatase      33 - 120 U/L 67    Total Protein      6.4 - 8.2 g/dL 6.9    AST      9 - 39 U/L 10    Bilirubin Total      0.0 - 1.2 mg/dL 0.5    ALT      10 - 52 U/L 14    CHOLESTEROL      0 - 199 mg/dL 126    HDL CHOLESTEROL      mg/dL 36.0    Cholesterol/HDL Ratio 3.5    LDL Calculated      <=99 mg/dL 66    VLDL      0 - 40 mg/dL 24    TRIGLYCERIDES      0 - 149 mg/dL 119    Non HDL Cholesterol      0 - 149 mg/dL 90    IRON      35 - 150 ug/dL 58    UIBC      110 - 370 ug/dL 265    TIBC      240 - 445 ug/dL 323    % Saturation      25 - 45 % 18 (L)    Hemoglobin A1C      see below % 5.6    Estimated Average Glucose      Not Established mg/dL 114    Thyroid Stimulating Hormone      0.44 - 3.98 mIU/L 1.52    Prostate Specific Antigen,Screen      <=4.00 ng/mL 1.21       Legend:  (H) High  (L) Low      Physical Exam  Constitutional:       Appearance: Normal appearance. He is normal weight.    HENT:      Head: Normocephalic.   Eyes:      Extraocular Movements: Extraocular movements intact.      Conjunctiva/sclera: Conjunctivae normal.      Pupils: Pupils are equal, round, and reactive to light.   Cardiovascular:      Rate and Rhythm: Normal rate and regular rhythm.      Pulses: Normal pulses.      Heart sounds: Normal heart sounds.   Pulmonary:      Effort: Pulmonary effort is normal.      Breath sounds: Normal breath sounds.   Musculoskeletal:         General: Normal range of motion.      Cervical back: Normal range of motion.   Skin:     General: Skin is warm and dry.   Neurological:      General: No focal deficit present.      Mental Status: He is alert and oriented to person, place, and time.   Psychiatric:         Mood and Affect: Mood normal.         Behavior: Behavior normal.         Thought Content: Thought content normal.         Judgment: Judgment normal.            Assessment/Plan   Problem List Items Addressed This Visit       Low back pain radiating to right leg    Lumbar degenerative disc disease       WE DISCUSSED MOST COMMON SIDE EFFECTS OF PRESCRIBED MEDICATIONS. INDICATIONS, RISK, COMPLICATIONS, AND ALTERNATIVES OF MEDICATION/THERAPEUTICS WERE EXPLAINED AND DISCUSSED. PLEASE MONITOR CLOSELY FOR ANY UNTOWARD SIDE EFFECTS OR COMPLICATIONS OF MEDICATIONS. PATIENT IS STRONGLY ADVISED TO BE COMPLIANT WITH RECOMMENDATIONS. QUESTIONS AND CONCERNS WERE ADDRESSED. INSTRUCTED TO CALL, RETURN SOONER, OR GO TO THE ER,  IF SYMPTOMS PERSIST OR WORSEN. THEY VOICED UNDERSTANDING AND  DENIES FURTHER QUESTIONS AT THIS TIME.    TIME CODE  1. PREPARATION FOR PATIENT'S VISIT (REVIEWING CHART, CURRENT MEDICAL RECORDS, OUTSIDE HEALTH PROVIDER RECORDS, PREVIOUS HISTORY, EXAM, TEST, PROCEDURE, AND MEDICATIONS)  2. FACE TO FACE ENCOUNTER OBTAINING HISTORY FROM THE PATIENT/FAMILY/CAREGIVERS; PERFORMING EVALUATION AND EXAMINATION; ORDERING TESTS OR PROCEDURES; REFERRING AND COMMUNICATING WITH OTHER HEALTHCARE  PROVIDERS; COUNSELING AND EDUCATION OF THE PATIENT/FAMILY/CAREGIVERS; INDEPENDENTLY INTERPRETING RESULTS (TESTS, LABS, PROCEDURES, IMAGING) AND COMMUNICATING AND EXPLAINING RESULTS TO THE PATIENT/FAMILY/CAREGIVERS  3. COORDINATION OF CARE; PREPARING AND PRINTING DISCHARGE INSTRUCTIONS AND ANY EDUCATIONAL MATERIAL FOR THE PATIENT/FAMILY/CAREGIVERS. DOCUMENTING CLINICAL INFORMATION IN THE ELECTRONIC MEDICAL RECORD   4. REVIEWING OARRS AS NEEDED    MDM  1) COMPLEXITY: MORE THAN 1 STABLE CHRONIC CONDITION ADDRESSED OR 1 ACUTE ILLNESS ADDRESSED   2)DATA: TESTS INTERPRETED AND OR ORDERED, TOOK INDEPENDENT HISTORY OR RECORDS REVIEWED  3)RISK: MODERATE RISK DUE TO NATURE OF MEDICAL CONDITIONS/COMORBIDITY OR MEDICATIONS ORDERED OR SURGICAL OR PROCEDURE REFERRAL    6 MONTHS WITH LABS

## 2024-02-26 ENCOUNTER — OFFICE VISIT (OUTPATIENT)
Dept: CARDIOLOGY | Facility: CLINIC | Age: 54
End: 2024-02-26
Payer: COMMERCIAL

## 2024-02-26 VITALS
DIASTOLIC BLOOD PRESSURE: 80 MMHG | HEIGHT: 74 IN | SYSTOLIC BLOOD PRESSURE: 120 MMHG | HEART RATE: 81 BPM | BODY MASS INDEX: 40.43 KG/M2 | WEIGHT: 315 LBS | OXYGEN SATURATION: 96 %

## 2024-02-26 DIAGNOSIS — I15.2 HYPERTENSION ASSOCIATED WITH DIABETES (MULTI): ICD-10-CM

## 2024-02-26 DIAGNOSIS — I70.90 ATHEROSCLEROSIS: Primary | ICD-10-CM

## 2024-02-26 DIAGNOSIS — E11.59 HYPERTENSION ASSOCIATED WITH DIABETES (MULTI): ICD-10-CM

## 2024-02-26 PROCEDURE — 3008F BODY MASS INDEX DOCD: CPT | Performed by: STUDENT IN AN ORGANIZED HEALTH CARE EDUCATION/TRAINING PROGRAM

## 2024-02-26 PROCEDURE — 3074F SYST BP LT 130 MM HG: CPT | Performed by: STUDENT IN AN ORGANIZED HEALTH CARE EDUCATION/TRAINING PROGRAM

## 2024-02-26 PROCEDURE — 3044F HG A1C LEVEL LT 7.0%: CPT | Performed by: STUDENT IN AN ORGANIZED HEALTH CARE EDUCATION/TRAINING PROGRAM

## 2024-02-26 PROCEDURE — 99406 BEHAV CHNG SMOKING 3-10 MIN: CPT | Performed by: STUDENT IN AN ORGANIZED HEALTH CARE EDUCATION/TRAINING PROGRAM

## 2024-02-26 PROCEDURE — 3079F DIAST BP 80-89 MM HG: CPT | Performed by: STUDENT IN AN ORGANIZED HEALTH CARE EDUCATION/TRAINING PROGRAM

## 2024-02-26 PROCEDURE — 99214 OFFICE O/P EST MOD 30 MIN: CPT | Performed by: STUDENT IN AN ORGANIZED HEALTH CARE EDUCATION/TRAINING PROGRAM

## 2024-02-26 PROCEDURE — 3048F LDL-C <100 MG/DL: CPT | Performed by: STUDENT IN AN ORGANIZED HEALTH CARE EDUCATION/TRAINING PROGRAM

## 2024-02-26 PROCEDURE — 4010F ACE/ARB THERAPY RXD/TAKEN: CPT | Performed by: STUDENT IN AN ORGANIZED HEALTH CARE EDUCATION/TRAINING PROGRAM

## 2024-02-26 RX ORDER — METOPROLOL SUCCINATE 25 MG/1
50 TABLET, EXTENDED RELEASE ORAL DAILY
Qty: 90 TABLET | Refills: 3 | Status: SHIPPED | OUTPATIENT
Start: 2024-02-26 | End: 2024-05-15 | Stop reason: SDUPTHER

## 2024-02-26 NOTE — PROGRESS NOTES
Chief Complaint   Patient presents with    6 month follow up     HPI:  I was requested by Dr. Bryant to evaluate this patient in consultation for cardiac assessment.    Patient 53-year-old current smoker male with prior medical history significant for hypertension, hyperlipidemia, morbid obesity, smoking and abnormal stress test in 9/2020 followed by normal left heart catheterization. Currently the patient was complaining of some chest discomfort and shortness of breath that have been happening for the past 3 months. He notes that the chest discomfort is somewhat random and last for hours at a time. The shortness of breath is principally with exertion and can get worse. He also notes some orthopnea. Denies any PND or lower extremity edema. He underwent Left Heart Catheterization on April/2023 through R radial artery that showed non-obstructive coronary artery disease.   EKG showing normal sinus rhythm and no abnormalities.  Echo showing normal LVEF 65% with no wall motion abnormalities. Mild asymmetric left ventricular hypertrophy  US doppler carotids showing Heterogenous non-obstructive plaque R carotid artery  LHC (04/2023) showing normal coronaries     Patient returns today feeling good. He has lost 20 lb after the LHC. Total weight lost of ~100 lb (430 --> 329). He has improved his diet and is taking Mounjaro. He is currently asymptomatic.       Past Medical History  Past Medical History:   Diagnosis Date    Encounter for screening for malignant neoplasm of prostate 12/04/2020    Screening PSA (prostate specific antigen)    Immunization not carried out because of patient refusal     Influenza vaccination declined       Past Surgical History  Past Surgical History:   Procedure Laterality Date    APPENDECTOMY      CARDIAC CATHETERIZATION  09/23/2020    CARDIAC CATHETERIZATION  04/2023    CHOLECYSTECTOMY      COLONOSCOPY  07/2014    DR. GONZALEZ - REPEAT 3 YEARS. H/O POLYP, PRE-CANCER CELLS    TONSILLECTOMY          Past Family History  Family History   Problem Relation Name Age of Onset    Breast cancer Mother      No Known Problems Father      Coronary artery disease Other GRANDPARENT     Hyperlipidemia Other GRANDPARENT     Hypertension Other GRANDPARENT     Heart attack Other GRANDPARENT        Allergy History  Allergies   Allergen Reactions    Amlodipine Swelling    Metformin Diarrhea       Past Social History  Social History     Socioeconomic History    Marital status:      Spouse name: None    Number of children: None    Years of education: None    Highest education level: None   Occupational History    None   Tobacco Use    Smoking status: Every Day     Packs/day: .5     Types: Cigarettes    Smokeless tobacco: Never   Vaping Use    Vaping Use: Never used   Substance and Sexual Activity    Alcohol use: Not Currently    Drug use: Never    Sexual activity: None   Other Topics Concern    None   Social History Narrative    None     Social Determinants of Health     Financial Resource Strain: Not on file   Food Insecurity: Not on file   Transportation Needs: Not on file   Physical Activity: Not on file   Stress: Not on file   Social Connections: Not on file   Intimate Partner Violence: Not on file   Housing Stability: Not on file       Social History     Tobacco Use   Smoking Status Every Day    Packs/day: .5    Types: Cigarettes   Smokeless Tobacco Never       Review of Systems:  Constitutional: not feeling poorly,~no fever,~no recent weight gain,~no recent weight loss~and~no chills.   Eyes: no blurred vision,~no diplopia~and~no eyesight problems.   ENT: no hearing loss,~no tinnitus,~no earache,~no sore throat,~no hoarseness,~no swollen glands in the neck~and~as noted in HPI.   Cardiovascular: no chest pain,~no tightness or heavy pressure,~no shortness of breath,~no palpitations~and~no lower extremity edema.   Respiratory: chronic cough, but~no cough,~not coughing up sputum,~no wheezing that is consistent with  "asthma~and~no shortness of breath during exertion.   Gastrointestinal: no change in bowel habits,~no diarrhea,~no constipation,~no bloody stools,~no nausea,~no vomiting,~no abdominal pain,~no signs and symptoms of ulcer disease,~no rory colored stools~and~no intolerance to fatty foods.   Genitourinary: no urinary frequency,~no dysuria,~no hematuria,~no burning sensation during urination,~urinary stream is not smaller,~urinary stream does not start and stop,~no incontinence,~no urinary hesitancy,~no nocturia,~no genital lesion~and~no testicular pain.   Musculoskeletal: arthralgias, but~no joint stiffness,~no muscle weakness,~no back pain~and~no difficulty walking.   Skin: no rashes,~no change in skin color and pigmentation,~no skin lesions~and~no skin lumps.   Neurological: no headaches,~no dizziness,~no seizures,~no tingling,~no numbness,~no signs and symptoms of stroke~and~no limb weakness.   Psychiatric: no confusion,~no memory lapses or loss,~no depression,~no sleep disturbances,~no anxiety~and~not suicidal.    Objective Data:  Last Recorded Vitals:  Vitals:    02/26/24 1532   BP: 120/80   Pulse: 81   SpO2: 96%   Weight: 149 kg (329 lb)   Height: 1.88 m (6' 2\")       Last Labs:  CBC - 2/6/2024:  6:53 AM  6.7 14.9 391    46.6      CMP - 2/6/2024:  6:53 AM  9.7 6.9 10 --- 0.5   _ 4.3 14 67      PTT - No results in last year.  _   _ _     TROPHS   Date/Time Value Ref Range Status   04/12/2023 01:14 PM 8 0 - 20 ng/L Final     Comment:     .  Less than 99th percentile of normal range cutoff-  Female and children under 18 years old <14 ng/L; Male <21 ng/L: Negative  Repeat testing should be performed if clinically indicated.   .  Female and children under 18 years old 14-50 ng/L; Male 21-50 ng/L:  Consistent with possible cardiac damage and possible increased clinical   risk. Serial measurements may help to assess extent of myocardial damage.   .  >50 ng/L: Consistent with cardiac damage, increased clinical risk " and  myocardial infarction. Serial measurements may help assess extent of   myocardial damage.   .   NOTE: Children less than 1 year old may have higher baseline troponin   levels and results should be interpreted in conjunction with the overall   clinical context.   .  NOTE: Troponin I testing is performed using a different   testing methodology at [facility] than at other   [facility]. Direct result comparisons should only   be made within the same method.     04/12/2023 11:03 AM 8 0 - 20 ng/L Final     Comment:     .  Less than 99th percentile of normal range cutoff-  Female and children under 18 years old <14 ng/L; Male <21 ng/L: Negative  Repeat testing should be performed if clinically indicated.   .  Female and children under 18 years old 14-50 ng/L; Male 21-50 ng/L:  Consistent with possible cardiac damage and possible increased clinical   risk. Serial measurements may help to assess extent of myocardial damage.   .  >50 ng/L: Consistent with cardiac damage, increased clinical risk and  myocardial infarction. Serial measurements may help assess extent of   myocardial damage.   .   NOTE: Children less than 1 year old may have higher baseline troponin   levels and results should be interpreted in conjunction with the overall   clinical context.   .  NOTE: Troponin I testing is performed using a different   testing methodology at [facility] than at other   [facility]. Direct result comparisons should only   be made within the same method.     04/12/2023 09:02 AM 9 0 - 20 ng/L Final     Comment:     .  Less than 99th percentile of normal range cutoff-  Female and children under 18 years old <14 ng/L; Male <21 ng/L: Negative  Repeat testing should be performed if clinically indicated.   .  Female and children under 18 years old 14-50 ng/L; Male 21-50 ng/L:  Consistent with possible cardiac damage and possible increased clinical   risk. Serial measurements may help to assess  extent of myocardial damage.   .  >50 ng/L: Consistent with cardiac damage, increased clinical risk and  myocardial infarction. Serial measurements may help assess extent of   myocardial damage.   .   NOTE: Children less than 1 year old may have higher baseline troponin   levels and results should be interpreted in conjunction with the overall   clinical context.   .  NOTE: Troponin I testing is performed using a different   testing methodology at [facility] than at other   [facility]. Direct result comparisons should only   be made within the same method.       HGBA1C   Date/Time Value Ref Range Status   02/06/2024 06:53 AM 5.6 see below % Final   08/04/2023 09:10 AM 6.0 % Final     Comment:          Diagnosis of Diabetes-Adults   Non-Diabetic: < or = 5.6%   Increased risk for developing diabetes: 5.7-6.4%   Diagnostic of diabetes: > or = 6.5%  .       Monitoring of Diabetes                Age (y)     Therapeutic Goal (%)   Adults:          >18           <7.0   Pediatrics:    13-18           <7.5                   7-12           <8.0                   0- 6            7.5-8.5   American Diabetes Association. Diabetes Care 33(S1), Jan 2010.   01/07/2023 08:59 AM 6.9 % Final     Comment:          Diagnosis of Diabetes-Adults   Non-Diabetic: < or = 5.6%   Increased risk for developing diabetes: 5.7-6.4%   Diagnostic of diabetes: > or = 6.5%  .       Monitoring of Diabetes                Age (y)     Therapeutic Goal (%)   Adults:          >18           <7.0   Pediatrics:    13-18           <7.5                   7-12           <8.0                   0- 6            7.5-8.5   American Diabetes Association. Diabetes Care 33(S1), Jan 2010.       LDLCALC   Date/Time Value Ref Range Status   02/06/2024 06:53 AM 66 <=99 mg/dL Final     Comment:                                 Near   Borderline      AGE      Desirable  Optimal    High     High     Very High     0-19 Y     0 - 109     ---    110-129   >/=  130     ----    20-24 Y     0 - 119     ---    120-159   >/= 160     ----      >24 Y     0 -  99   100-129  130-159   160-189     >/=190       VLDL   Date/Time Value Ref Range Status   02/06/2024 06:53 AM 24 0 - 40 mg/dL Final   01/07/2023 08:59 AM 17 0 - 40 mg/dL Final   10/16/2021 08:47 AM 34 0 - 40 mg/dL Final   07/27/2020 03:12 PM 40 0 - 40 mg/dL Final        Patient Medications:  Outpatient Encounter Medications as of 2/26/2024   Medication Sig Dispense Refill    albuterol 90 mcg/actuation inhaler Inhale 2 puffs every 6 hours if needed for wheezing or shortness of breath. 18 g 11    aspirin 81 mg EC tablet Take 1 tablet (81 mg) by mouth once daily. 90 tablet 3    atorvastatin (Lipitor) 40 mg tablet Take 1 tablet (40 mg) by mouth once daily at bedtime. 90 tablet 3    chlorthalidone (Hygroton) 25 mg tablet Take 1 tablet (25 mg) by mouth once daily. 90 tablet 3    cyclobenzaprine (Flexeril) 10 mg tablet Take 1 tablet (10 mg) by mouth 3 times a day as needed for muscle spasms. 90 tablet 3    fluticasone-umeclidin-vilanter (TRELEGY-ELLIPTA) 100-62.5-25 mcg blister with device Inhale 1 puff once daily. 3 each 3    ipratropium-albuteroL (Duo-Neb) 0.5-2.5 mg/3 mL nebulizer solution Take 3 mL by nebulization 4 times a day.      lisinopril 10 mg tablet Take 1 tablet (10 mg) by mouth once daily. 90 tablet 3    metoprolol succinate XL (Toprol-XL) 25 mg 24 hr tablet Take 1 tablet (25 mg) by mouth once daily. Do not crush or chew. 90 tablet 3    naproxen (Naprosyn) 500 mg tablet Take 1 tablet (500 mg) by mouth 2 times a day as needed for mild pain (1 - 3) (pain). 60 tablet 5    NIFEdipine ER (Adalat CC) 30 mg 24 hr tablet Take 1 tablet (30 mg) by mouth once daily. 90 tablet 3    omeprazole (PriLOSEC) 40 mg DR capsule Take 1 capsule (40 mg) by mouth once daily. 90 capsule 3    tirzepatide (Mounjaro) 5 mg/0.5 mL pen injector Inject 5 mg under the skin 1 (one) time per week. 2 mL 5    clotrimazole (Lotrimin) 1 % cream Apply 1  Application topically 2 times a day.      imiquimod (Aldara) 5 % cream Apply 1 packet topically once daily.       No facility-administered encounter medications on file as of 2/26/2024.       Physical Exam:  General: alert, oriented and in no acute distress. Obesity  HEENT: NC/AT; EOMI; PERRLA, external ear is normal  Neck: supple; trachea midline; no masses; no JVD  Chest: clear breath sounds bilaterally; no wheezing  Cardio: regular rhythm, S1S2 normal, no murmurs  Abdomen: Soft, non-tender, non-distension, no organomegaly  Extremities: no clubbing/cyanosis/edema  Neuro: Grossly intact     Psychiatric: Normal mood and affect     Past Cardiology Results (Last 3 Years):    I have personally reviewed the test results and my impressions are:  Left Heart Catheterization on April/2023 through R radial artery that showed non-obstructive coronary artery disease.   EKG showing normal sinus rhythm and no abnormalities.  Echo showing normal LVEF 65% with no wall motion abnormalities. Mild asymmetric left ventricular hypertrophy  Heterogenous non-obstructive plaque R carotid artery  LHC (04/2023) showing normal coronaries     EKG:  No results found for this or any previous visit from the past 1095 days.    Echo:  Echo Results:  No results found for this or any previous visit from the past 365 days.     Cath:  No results found for this or any previous visit from the past 1095 days.    CV NCDR CATHPCI V5 COLLECTION FORM   Stress Test:  No results found for this or any previous visit from the past 1095 days.    Cardiac Imaging:  No results found for this or any previous visit from the past 1095 days.       Assessment/Plan   In summary, Mr. Davis is a 53-year-old male with prior medical history significant for hypertension, hyperlipidemia, obesity, smoking and abnormal stress test in 9/2020 followed by normal left heart catheterization. Currently the patient was complaining of some chest discomfort and shortness of breath that  have been happening for the past 3 months. He underwent Left Heart Catheterization on April/2023 through R radial artery that showed non-obstructive coronary artery disease.      # Atherosclerosis / S/P PCI to OM1 / Heterogenous non-obstructive plaque R carotid artery  - He underwent LHC on April/2023 through R radial artery that showed non-obstructive coronary artery disease.   - Good healing in the R radial access site. Good radial pulse.  - No need for further coronary assessment at this point.  - Keep ASA 81mg daily and Atorvastatin 40mg daily.  -We had a thorough conversation regarding good habits, good diet and avoiding junk food. Advised for exercises. The patient also states that he has lost weight from 430 to 329 pounds during this year and he will keep it up. He is taking Mounjaro.  - HR 82bpm. Will increase Metoprolol succinate to 50mg daily.  - Follow up in 6 months.     # Hypertension  - BP controlled  - Echo showing normal LVEF 65% with mild asymmetric left ventricular hypertrophy  - Keep home medication     # T2DM  - Has lost 20lb in the past 3 months  - Keep Mounjaro, diabetic diet  - Counseling     # Hyperlipidemia  - Keep Atorvastatin 40mg daily.     # Smoking  - Advised to quit smoking. We have extensive conversation (~3min) about it and the patient is contemplative to stop.     We have discussed the most common side effects of the prescribed medications, indications, drug interactions, risks, complications, and alternatives of medications/therapeutics were explained and discussed. The patient has been requested to monitor closely for any untoward side effects or complications of medications. The patient has been strongly advised to be compliant with the recommendations, all the questions and concerns have been addressed. The patient has been also instructed to call, to return sooner or to go to the emergency department if symptoms persist or get worsen. The patient voiced understanding and denies  any further questions at this time.     This note was transcribed using the Dragon Dictation system. There may be grammatical, punctuation, or verbiage errors that occur with voice recognition programs.     Counseling greater than 50% of visit regarding all cardiac issues.    Thank you, Dr. Bryant, for allowing me to participate in the care of this patient. Please reach me out if you have any questions or if you need any clarifications regarding the patient's care.    Colby Higginbotham MD  Cardiology

## 2024-03-20 ENCOUNTER — OFFICE VISIT (OUTPATIENT)
Dept: PRIMARY CARE | Facility: CLINIC | Age: 54
End: 2024-03-20
Payer: COMMERCIAL

## 2024-03-20 VITALS
SYSTOLIC BLOOD PRESSURE: 130 MMHG | DIASTOLIC BLOOD PRESSURE: 82 MMHG | BODY MASS INDEX: 41.6 KG/M2 | HEART RATE: 89 BPM | OXYGEN SATURATION: 94 % | WEIGHT: 315 LBS

## 2024-03-20 DIAGNOSIS — E66.01 CLASS 3 SEVERE OBESITY DUE TO EXCESS CALORIES WITH SERIOUS COMORBIDITY AND BODY MASS INDEX (BMI) OF 40.0 TO 44.9 IN ADULT (MULTI): ICD-10-CM

## 2024-03-20 DIAGNOSIS — J44.1 COPD EXACERBATION (MULTI): Primary | ICD-10-CM

## 2024-03-20 PROCEDURE — 99214 OFFICE O/P EST MOD 30 MIN: CPT | Performed by: NURSE PRACTITIONER

## 2024-03-20 PROCEDURE — 96372 THER/PROPH/DIAG INJ SC/IM: CPT | Performed by: NURSE PRACTITIONER

## 2024-03-20 PROCEDURE — 3079F DIAST BP 80-89 MM HG: CPT | Performed by: NURSE PRACTITIONER

## 2024-03-20 PROCEDURE — 4010F ACE/ARB THERAPY RXD/TAKEN: CPT | Performed by: NURSE PRACTITIONER

## 2024-03-20 PROCEDURE — 3044F HG A1C LEVEL LT 7.0%: CPT | Performed by: NURSE PRACTITIONER

## 2024-03-20 PROCEDURE — 3048F LDL-C <100 MG/DL: CPT | Performed by: NURSE PRACTITIONER

## 2024-03-20 PROCEDURE — 3075F SYST BP GE 130 - 139MM HG: CPT | Performed by: NURSE PRACTITIONER

## 2024-03-20 PROCEDURE — 3008F BODY MASS INDEX DOCD: CPT | Performed by: NURSE PRACTITIONER

## 2024-03-20 RX ORDER — DOXYCYCLINE 100 MG/1
100 TABLET ORAL 2 TIMES DAILY
Qty: 20 TABLET | Refills: 1 | Status: SHIPPED | OUTPATIENT
Start: 2024-03-20 | End: 2024-03-30

## 2024-03-20 RX ORDER — PREDNISONE 10 MG/1
30 TABLET ORAL DAILY
Qty: 15 TABLET | Refills: 1 | Status: SHIPPED | OUTPATIENT
Start: 2024-03-20

## 2024-03-20 ASSESSMENT — ENCOUNTER SYMPTOMS
JOINT SWELLING: 0
PHOTOPHOBIA: 0
ABDOMINAL DISTENTION: 0
CHEST TIGHTNESS: 0
SPEECH DIFFICULTY: 0
CONFUSION: 0
FATIGUE: 0
DIZZINESS: 0
NERVOUS/ANXIOUS: 0
FLANK PAIN: 0
PALPITATIONS: 0
BACK PAIN: 0
WEAKNESS: 0
SORE THROAT: 0
UNEXPECTED WEIGHT CHANGE: 0
FREQUENCY: 0
NAUSEA: 0
DIARRHEA: 0
CHOKING: 0
HEMATURIA: 0
FACIAL ASYMMETRY: 0
VOMITING: 0
CONSTIPATION: 0
EYE PAIN: 0
WOUND: 0
ARTHRALGIAS: 0
HEADACHES: 0
BLOOD IN STOOL: 0
EYE REDNESS: 0
FEVER: 0
DYSURIA: 0
NECK PAIN: 0
SLEEP DISTURBANCE: 0
CHILLS: 0
DIFFICULTY URINATING: 0
NUMBNESS: 0
WHEEZING: 1
MYALGIAS: 0
SEIZURES: 0
APNEA: 0
ABDOMINAL PAIN: 0
SHORTNESS OF BREATH: 1
TROUBLE SWALLOWING: 0
COUGH: 0

## 2024-03-20 ASSESSMENT — PATIENT HEALTH QUESTIONNAIRE - PHQ9
SUM OF ALL RESPONSES TO PHQ9 QUESTIONS 1 AND 2: 0
2. FEELING DOWN, DEPRESSED OR HOPELESS: NOT AT ALL
1. LITTLE INTEREST OR PLEASURE IN DOING THINGS: NOT AT ALL

## 2024-03-20 NOTE — LETTER
March 20, 2024     Patient: Boy Davis .   YOB: 1970   Date of Visit: 3/20/2024       To Whom It May Concern:    Boy Davis was seen in my clinic on 3/20/2024. Please excuse Boy for his absence from work on 3/20/24-3/22/24. If you have any questions or concerns, please don't hesitate to call.         Sincerely,              Nat Bryant, LUCY-CNP

## 2024-03-20 NOTE — PROGRESS NOTES
Subjective   Patient ID: Boy Davis Jr. is a 53 y.o. male who presents for COPD (Pt c/o sob x 1 day ).      HPI:  Presents today for C/O INCREASED SOB X 1 DAY  modifying factors consists of HE HAS COPD.  associated symptoms consist of WHEEZE.  prior treatment consists of medication INHALERS AND NEBULIZER     Visit Vitals  /82 (BP Location: Left arm, Patient Position: Sitting)   Pulse 89   Wt 147 kg (324 lb)   SpO2 94%   BMI 41.60 kg/m²   Smoking Status Every Day   BSA 2.77 m²        Review of Systems   Constitutional:  Negative for chills, fatigue, fever and unexpected weight change.   HENT:  Negative for congestion, ear pain, sore throat and trouble swallowing.    Eyes:  Negative for photophobia, pain, redness and visual disturbance.   Respiratory:  Positive for shortness of breath and wheezing. Negative for apnea, cough, choking and chest tightness.    Cardiovascular:  Negative for chest pain, palpitations and leg swelling.   Gastrointestinal:  Negative for abdominal distention, abdominal pain, blood in stool, constipation, diarrhea, nausea and vomiting.   Genitourinary:  Negative for difficulty urinating, dysuria, flank pain, frequency, hematuria and urgency.   Musculoskeletal:  Negative for arthralgias, back pain, gait problem, joint swelling, myalgias and neck pain.   Skin:  Negative for rash and wound.   Neurological:  Negative for dizziness, seizures, syncope, facial asymmetry, speech difficulty, weakness, numbness and headaches.   Psychiatric/Behavioral:  Negative for confusion, sleep disturbance and suicidal ideas. The patient is not nervous/anxious.        Objective     Physical Exam  Constitutional:       Appearance: Normal appearance. He is normal weight.   HENT:      Head: Normocephalic.   Eyes:      Extraocular Movements: Extraocular movements intact.      Conjunctiva/sclera: Conjunctivae normal.      Pupils: Pupils are equal, round, and reactive to light.   Cardiovascular:      Rate and  Rhythm: Normal rate and regular rhythm.      Pulses: Normal pulses.      Heart sounds: Normal heart sounds.   Pulmonary:      Effort: Pulmonary effort is normal.      Breath sounds: Wheezing present.   Musculoskeletal:         General: Normal range of motion.      Cervical back: Normal range of motion.   Skin:     General: Skin is warm and dry.   Neurological:      General: No focal deficit present.      Mental Status: He is alert and oriented to person, place, and time.   Psychiatric:         Mood and Affect: Mood normal.         Behavior: Behavior normal.         Thought Content: Thought content normal.         Judgment: Judgment normal.            Assessment/Plan   Problem List Items Addressed This Visit       Class 3 severe obesity due to excess calories with serious comorbidity and body mass index (BMI) of 40.0 to 44.9 in adult (CMS/[facility])    COPD exacerbation (CMS/[facility]) - Primary    Relevant Medications    doxycycline (Adoxa) 100 mg tablet    predniSONE (Deltasone) 10 mg tablet    dexAMETHasone (Decadron) injection 4 mg (Completed) (Start on 3/20/2024  8:45 AM)   WE DISCUSSED MOST COMMON SIDE EFFECTS OF PRESCRIBED MEDICATIONS. INDICATIONS, RISK, COMPLICATIONS, AND ALTERNATIVES OF MEDICATION/THERAPEUTICS WERE EXPLAINED AND DISCUSSED. PLEASE MONITOR CLOSELY FOR ANY UNTOWARD SIDE EFFECTS OR COMPLICATIONS OF MEDICATIONS. PATIENT IS STRONGLY ADVISED TO BE COMPLIANT WITH RECOMMENDATIONS. QUESTIONS AND CONCERNS WERE ADDRESSED. INSTRUCTED TO CALL, RETURN SOONER, OR GO TO THE ER,  IF SYMPTOMS PERSIST OR WORSEN. THEY VOICED UNDERSTANDING AND  DENIES FURTHER QUESTIONS AT THIS TIME.    TIME CODE  1. PREPARATION FOR PATIENT'S VISIT (REVIEWING CHART, CURRENT MEDICAL RECORDS, OUTSIDE HEALTH PROVIDER RECORDS, PREVIOUS HISTORY, EXAM, TEST, PROCEDURE, AND MEDICATIONS)  2. FACE TO FACE ENCOUNTER OBTAINING HISTORY FROM THE PATIENT/FAMILY/CAREGIVERS; PERFORMING EVALUATION AND EXAMINATION; ORDERING TESTS OR PROCEDURES; REFERRING AND  COMMUNICATING WITH OTHER HEALTHCARE PROVIDERS; COUNSELING AND EDUCATION OF THE PATIENT/FAMILY/CAREGIVERS; INDEPENDENTLY INTERPRETING RESULTS (TESTS, LABS, PROCEDURES, IMAGING) AND COMMUNICATING AND EXPLAINING RESULTS TO THE PATIENT/FAMILY/CAREGIVERS  3. COORDINATION OF CARE; PREPARING AND PRINTING DISCHARGE INSTRUCTIONS AND ANY EDUCATIONAL MATERIAL FOR THE PATIENT/FAMILY/CAREGIVERS. DOCUMENTING CLINICAL INFORMATION IN THE ELECTRONIC MEDICAL RECORD   4. REVIEWING OARRS AS NEEDED    MDM  1) COMPLEXITY: MORE THAN 1 STABLE CHRONIC CONDITION ADDRESSED OR 1 ACUTE ILLNESS ADDRESSED   2)DATA: TESTS INTERPRETED AND OR ORDERED, TOOK INDEPENDENT HISTORY OR RECORDS REVIEWED  3)RISK: MODERATE RISK DUE TO NATURE OF MEDICAL CONDITIONS/COMORBIDITY OR MEDICATIONS ORDERED OR SURGICAL OR PROCEDURE REFERRAL    Follow up as before

## 2024-04-16 ENCOUNTER — TELEPHONE (OUTPATIENT)
Dept: PRIMARY CARE | Facility: CLINIC | Age: 54
End: 2024-04-16
Payer: COMMERCIAL

## 2024-04-16 NOTE — TELEPHONE ENCOUNTER
Patient called and states mounjaro is on backorder at all pharmacies. Pt will need an alternative medication. Patients provider is out of the office. Please advise

## 2024-04-18 ENCOUNTER — OFFICE VISIT (OUTPATIENT)
Dept: PRIMARY CARE | Facility: CLINIC | Age: 54
End: 2024-04-18
Payer: COMMERCIAL

## 2024-04-18 ENCOUNTER — TELEPHONE (OUTPATIENT)
Dept: PRIMARY CARE | Facility: CLINIC | Age: 54
End: 2024-04-18

## 2024-04-18 VITALS
DIASTOLIC BLOOD PRESSURE: 77 MMHG | OXYGEN SATURATION: 94 % | BODY MASS INDEX: 40.43 KG/M2 | HEIGHT: 74 IN | SYSTOLIC BLOOD PRESSURE: 114 MMHG | HEART RATE: 76 BPM | WEIGHT: 315 LBS

## 2024-04-18 DIAGNOSIS — E66.01 CLASS 3 SEVERE OBESITY DUE TO EXCESS CALORIES WITH SERIOUS COMORBIDITY AND BODY MASS INDEX (BMI) OF 40.0 TO 44.9 IN ADULT (MULTI): ICD-10-CM

## 2024-04-18 DIAGNOSIS — E11.9 TYPE 2 DIABETES MELLITUS WITHOUT COMPLICATION, WITHOUT LONG-TERM CURRENT USE OF INSULIN (MULTI): Primary | ICD-10-CM

## 2024-04-18 PROCEDURE — 4004F PT TOBACCO SCREEN RCVD TLK: CPT | Performed by: INTERNAL MEDICINE

## 2024-04-18 PROCEDURE — 3008F BODY MASS INDEX DOCD: CPT | Performed by: INTERNAL MEDICINE

## 2024-04-18 PROCEDURE — 3074F SYST BP LT 130 MM HG: CPT | Performed by: INTERNAL MEDICINE

## 2024-04-18 PROCEDURE — 3044F HG A1C LEVEL LT 7.0%: CPT | Performed by: INTERNAL MEDICINE

## 2024-04-18 PROCEDURE — 4010F ACE/ARB THERAPY RXD/TAKEN: CPT | Performed by: INTERNAL MEDICINE

## 2024-04-18 PROCEDURE — 99213 OFFICE O/P EST LOW 20 MIN: CPT | Performed by: INTERNAL MEDICINE

## 2024-04-18 PROCEDURE — 3048F LDL-C <100 MG/DL: CPT | Performed by: INTERNAL MEDICINE

## 2024-04-18 PROCEDURE — 3078F DIAST BP <80 MM HG: CPT | Performed by: INTERNAL MEDICINE

## 2024-04-18 ASSESSMENT — ENCOUNTER SYMPTOMS
FEVER: 0
COUGH: 0
BACK PAIN: 0
PSYCHIATRIC NEGATIVE: 1
WEAKNESS: 0
AGITATION: 0
LIGHT-HEADEDNESS: 0
CARDIOVASCULAR NEGATIVE: 1
HEADACHES: 0
NAUSEA: 0
CHILLS: 0
RHINORRHEA: 0
PALPITATIONS: 0
ENDOCRINE NEGATIVE: 1
ABDOMINAL PAIN: 0
MUSCULOSKELETAL NEGATIVE: 1
WHEEZING: 0
GASTROINTESTINAL NEGATIVE: 1
DYSURIA: 0
DIZZINESS: 0
DIARRHEA: 0
CONSTIPATION: 0
VOMITING: 0
SHORTNESS OF BREATH: 0
ABDOMINAL DISTENTION: 0
NEUROLOGICAL NEGATIVE: 1
EYES NEGATIVE: 1

## 2024-04-18 ASSESSMENT — PATIENT HEALTH QUESTIONNAIRE - PHQ9
1. LITTLE INTEREST OR PLEASURE IN DOING THINGS: NOT AT ALL
2. FEELING DOWN, DEPRESSED OR HOPELESS: NOT AT ALL
SUM OF ALL RESPONSES TO PHQ9 QUESTIONS 1 AND 2: 0

## 2024-04-18 NOTE — PROGRESS NOTES
Subjective   Patient ID: Boy Davis Jr. is a 53 y.o. male who presents for Follow-up (NEEDS ALTERNATIVE MED, MOUNJARO IS ON BACK ORDER.).  HPI  WEIGHT AND DM2 F/U. MOUNJARO IS ON BACK ORDER AND WANTS TO REPLACE IT WITH  SIMILAR MEDICATION .  Review of Systems   Constitutional:  Negative for chills and fever.   HENT: Negative.  Negative for congestion, postnasal drip and rhinorrhea.    Eyes: Negative.  Negative for visual disturbance.   Respiratory:  Negative for cough, shortness of breath and wheezing.    Cardiovascular: Negative.  Negative for chest pain, palpitations and leg swelling.   Gastrointestinal: Negative.  Negative for abdominal distention, abdominal pain, constipation, diarrhea, nausea and vomiting.   Endocrine: Negative.    Genitourinary:  Negative for dysuria and urgency.   Musculoskeletal: Negative.  Negative for back pain.   Skin: Negative.  Negative for rash.   Allergic/Immunologic: Negative for immunocompromised state.   Neurological: Negative.  Negative for dizziness, weakness, light-headedness and headaches.   Psychiatric/Behavioral: Negative.  Negative for agitation.        Objective   Physical Exam  Constitutional:       General: He is not in acute distress.  HENT:      Head: Normocephalic.      Nose: Nose normal.      Mouth/Throat:      Mouth: Mucous membranes are moist.   Eyes:      Conjunctiva/sclera: Conjunctivae normal.      Pupils: Pupils are equal, round, and reactive to light.   Cardiovascular:      Rate and Rhythm: Normal rate and regular rhythm.      Pulses: Normal pulses.      Heart sounds: Normal heart sounds.   Pulmonary:      Effort: No respiratory distress.      Breath sounds: No wheezing.   Chest:      Chest wall: No tenderness.   Abdominal:      General: Abdomen is flat. Bowel sounds are normal.      Palpations: Abdomen is soft.      Tenderness: There is no abdominal tenderness.   Musculoskeletal:         General: No tenderness. Normal range of motion.      Cervical back:  Normal range of motion.   Lymphadenopathy:      Cervical: No cervical adenopathy.   Skin:     General: Skin is warm and dry.      Findings: No rash.   Neurological:      General: No focal deficit present.      Mental Status: He is alert. Mental status is at baseline.   Psychiatric:         Mood and Affect: Mood normal.         Behavior: Behavior normal.         Assessment/Plan   1. Type 2 diabetes mellitus without complication, without long-term current use of insulin (Multi)  semaglutide (OZEMPIC) 1 mg/dose (4 mg/3 mL) pen injector    DISCONTINUED: semaglutide 0.25 mg or 0.5 mg (2 mg/3 mL) pen injector      2. Class 3 severe obesity due to excess calories with serious comorbidity and body mass index (BMI) of 40.0 to 44.9 in adult (Multi)  semaglutide (OZEMPIC) 1 mg/dose (4 mg/3 mL) pen injector    DISCONTINUED: semaglutide 0.25 mg or 0.5 mg (2 mg/3 mL) pen injector      Diabetes Mellitus/IFG addressed as follow:    [address]  HGA1C GOAL LESS THAN 7  LOSE WT  EXERCISE DAILY  ADVISED TO HAVE LOW FAT AND LOW CALORIE DIET AND TO LOOSE WEIGHT, DAILY EXERCISE.     1 mon with pcp

## 2024-04-18 NOTE — TELEPHONE ENCOUNTER
RECEIVED NOTIFICATION FROM THE PHARMACY THAT OZEMPIC REQUIRES A PRIOR AUTH. HOWEVER I NOTICED THE RX THAT WAS SENT WAS THE 0.25 MG OR 0.5 MG DOSE 2 MG/3ML, AND PATIENT IS SUPPOSED TO INJECT 1 MG/WEEK. IS 1 MG/WEEK CORRECT? IF SO, PLEASE SEND NEW RX FOR OZEMPIC 1MG DOSE 4MG/3ML.

## 2024-04-25 ENCOUNTER — TELEPHONE (OUTPATIENT)
Dept: PRIMARY CARE | Facility: CLINIC | Age: 54
End: 2024-04-25
Payer: COMMERCIAL

## 2024-04-30 NOTE — TELEPHONE ENCOUNTER
OZEMPIC DENIED BY INSURANCE. RECORDS DON'T SHOW DOCUMENTATION OF HEMOGLOBIN A1C GREATER THAN OR EQUAL TO 6.5, FASTING GLUCOSE GREATER THAN OR EQUAL , 2 HOUR PLASMA GLUCOSE GREATER THAN OR EQUAL  as PART OF A GLUCOSE TOLERANCE TEST, SYMPTOMS OF HYPERGLYCEMIA (INCLUDING POLYURIA, POLYDIPSIA, POLYPHAGIA) WITH A RANDOM GLUCOSE READING GREATER THAN OR EQUAL .

## 2024-05-15 DIAGNOSIS — E11.59 HYPERTENSION ASSOCIATED WITH DIABETES (MULTI): ICD-10-CM

## 2024-05-15 DIAGNOSIS — I15.2 HYPERTENSION ASSOCIATED WITH DIABETES (MULTI): ICD-10-CM

## 2024-05-15 RX ORDER — METOPROLOL SUCCINATE 25 MG/1
50 TABLET, EXTENDED RELEASE ORAL DAILY
Qty: 180 TABLET | Refills: 3 | Status: SHIPPED | OUTPATIENT
Start: 2024-05-15 | End: 2024-05-31 | Stop reason: DRUGHIGH

## 2024-05-17 ENCOUNTER — APPOINTMENT (OUTPATIENT)
Dept: CARDIOLOGY | Facility: CLINIC | Age: 54
End: 2024-05-17
Payer: COMMERCIAL

## 2024-05-21 ENCOUNTER — APPOINTMENT (OUTPATIENT)
Dept: CARDIOLOGY | Facility: HOSPITAL | Age: 54
End: 2024-05-21
Payer: COMMERCIAL

## 2024-05-21 ENCOUNTER — HOSPITAL ENCOUNTER (EMERGENCY)
Facility: HOSPITAL | Age: 54
Discharge: HOME | End: 2024-05-21
Attending: EMERGENCY MEDICINE
Payer: COMMERCIAL

## 2024-05-21 ENCOUNTER — HOSPITAL ENCOUNTER (OUTPATIENT)
Dept: CARDIOLOGY | Facility: HOSPITAL | Age: 54
Discharge: HOME | End: 2024-05-21
Payer: COMMERCIAL

## 2024-05-21 ENCOUNTER — APPOINTMENT (OUTPATIENT)
Dept: RADIOLOGY | Facility: HOSPITAL | Age: 54
End: 2024-05-21
Payer: COMMERCIAL

## 2024-05-21 ENCOUNTER — APPOINTMENT (OUTPATIENT)
Dept: PRIMARY CARE | Facility: CLINIC | Age: 54
End: 2024-05-21
Payer: COMMERCIAL

## 2024-05-21 VITALS
HEIGHT: 74 IN | RESPIRATION RATE: 16 BRPM | WEIGHT: 315 LBS | DIASTOLIC BLOOD PRESSURE: 64 MMHG | BODY MASS INDEX: 40.43 KG/M2 | SYSTOLIC BLOOD PRESSURE: 112 MMHG | OXYGEN SATURATION: 95 % | HEART RATE: 69 BPM | TEMPERATURE: 98.2 F

## 2024-05-21 DIAGNOSIS — R00.2 PALPITATIONS: Primary | ICD-10-CM

## 2024-05-21 LAB
ANION GAP SERPL CALC-SCNC: 12 MMOL/L (ref 10–20)
APTT PPP: 30 SECONDS (ref 27–38)
BASOPHILS # BLD AUTO: 0.06 X10*3/UL (ref 0–0.1)
BASOPHILS NFR BLD AUTO: 0.6 %
BNP SERPL-MCNC: 64 PG/ML (ref 0–99)
BUN SERPL-MCNC: 21 MG/DL (ref 6–23)
CALCIUM SERPL-MCNC: 9.1 MG/DL (ref 8.6–10.3)
CARDIAC TROPONIN I PNL SERPL HS: 4 NG/L (ref 0–20)
CHLORIDE SERPL-SCNC: 103 MMOL/L (ref 98–107)
CO2 SERPL-SCNC: 24 MMOL/L (ref 21–32)
CREAT SERPL-MCNC: 0.75 MG/DL (ref 0.5–1.3)
EGFRCR SERPLBLD CKD-EPI 2021: >90 ML/MIN/1.73M*2
EOSINOPHIL # BLD AUTO: 0.15 X10*3/UL (ref 0–0.7)
EOSINOPHIL NFR BLD AUTO: 1.5 %
ERYTHROCYTE [DISTWIDTH] IN BLOOD BY AUTOMATED COUNT: 17.5 % (ref 11.5–14.5)
GLUCOSE SERPL-MCNC: 94 MG/DL (ref 74–99)
HCT VFR BLD AUTO: 43.7 % (ref 41–52)
HGB BLD-MCNC: 14.3 G/DL (ref 13.5–17.5)
IMM GRANULOCYTES # BLD AUTO: 0.03 X10*3/UL (ref 0–0.7)
IMM GRANULOCYTES NFR BLD AUTO: 0.3 % (ref 0–0.9)
INR PPP: 1 (ref 0.9–1.1)
LYMPHOCYTES # BLD AUTO: 2.82 X10*3/UL (ref 1.2–4.8)
LYMPHOCYTES NFR BLD AUTO: 29 %
MAGNESIUM SERPL-MCNC: 1.7 MG/DL (ref 1.6–2.4)
MCH RBC QN AUTO: 26.8 PG (ref 26–34)
MCHC RBC AUTO-ENTMCNC: 32.7 G/DL (ref 32–36)
MCV RBC AUTO: 82 FL (ref 80–100)
MONOCYTES # BLD AUTO: 0.62 X10*3/UL (ref 0.1–1)
MONOCYTES NFR BLD AUTO: 6.4 %
NEUTROPHILS # BLD AUTO: 6.05 X10*3/UL (ref 1.2–7.7)
NEUTROPHILS NFR BLD AUTO: 62.2 %
NRBC BLD-RTO: 0 /100 WBCS (ref 0–0)
PLATELET # BLD AUTO: 359 X10*3/UL (ref 150–450)
POTASSIUM SERPL-SCNC: 3.7 MMOL/L (ref 3.5–5.3)
PROTHROMBIN TIME: 11.7 SECONDS (ref 9.8–12.8)
RBC # BLD AUTO: 5.34 X10*6/UL (ref 4.5–5.9)
SODIUM SERPL-SCNC: 135 MMOL/L (ref 136–145)
TSH SERPL-ACNC: 1.5 MIU/L (ref 0.44–3.98)
WBC # BLD AUTO: 9.7 X10*3/UL (ref 4.4–11.3)

## 2024-05-21 PROCEDURE — 85610 PROTHROMBIN TIME: CPT | Performed by: EMERGENCY MEDICINE

## 2024-05-21 PROCEDURE — 9420000001 HC RT PATIENT EDUCATION 5 MIN

## 2024-05-21 PROCEDURE — 83735 ASSAY OF MAGNESIUM: CPT | Performed by: EMERGENCY MEDICINE

## 2024-05-21 PROCEDURE — 71045 X-RAY EXAM CHEST 1 VIEW: CPT | Performed by: RADIOLOGY

## 2024-05-21 PROCEDURE — 84443 ASSAY THYROID STIM HORMONE: CPT | Performed by: EMERGENCY MEDICINE

## 2024-05-21 PROCEDURE — 2500000004 HC RX 250 GENERAL PHARMACY W/ HCPCS (ALT 636 FOR OP/ED): Performed by: EMERGENCY MEDICINE

## 2024-05-21 PROCEDURE — 93242 EXT ECG>48HR<7D RECORDING: CPT

## 2024-05-21 PROCEDURE — 84484 ASSAY OF TROPONIN QUANT: CPT | Performed by: EMERGENCY MEDICINE

## 2024-05-21 PROCEDURE — 82374 ASSAY BLOOD CARBON DIOXIDE: CPT | Performed by: EMERGENCY MEDICINE

## 2024-05-21 PROCEDURE — 96360 HYDRATION IV INFUSION INIT: CPT

## 2024-05-21 PROCEDURE — 83880 ASSAY OF NATRIURETIC PEPTIDE: CPT | Performed by: EMERGENCY MEDICINE

## 2024-05-21 PROCEDURE — 93005 ELECTROCARDIOGRAM TRACING: CPT | Mod: 59

## 2024-05-21 PROCEDURE — 85730 THROMBOPLASTIN TIME PARTIAL: CPT | Performed by: EMERGENCY MEDICINE

## 2024-05-21 PROCEDURE — 85025 COMPLETE CBC W/AUTO DIFF WBC: CPT | Performed by: EMERGENCY MEDICINE

## 2024-05-21 PROCEDURE — 71045 X-RAY EXAM CHEST 1 VIEW: CPT

## 2024-05-21 PROCEDURE — 36415 COLL VENOUS BLD VENIPUNCTURE: CPT | Performed by: EMERGENCY MEDICINE

## 2024-05-21 PROCEDURE — 99283 EMERGENCY DEPT VISIT LOW MDM: CPT | Mod: 25

## 2024-05-21 RX ORDER — NAPROXEN 250 MG/1
250 TABLET ORAL DAILY PRN
COMMUNITY

## 2024-05-21 RX ORDER — SODIUM CHLORIDE 9 MG/ML
125 INJECTION, SOLUTION INTRAVENOUS CONTINUOUS
Status: DISCONTINUED | OUTPATIENT
Start: 2024-05-21 | End: 2024-05-21 | Stop reason: HOSPADM

## 2024-05-21 RX ADMIN — SODIUM CHLORIDE 125 ML/HR: 9 INJECTION, SOLUTION INTRAVENOUS at 15:24

## 2024-05-21 ASSESSMENT — ENCOUNTER SYMPTOMS
HEMATOLOGIC/LYMPHATIC NEGATIVE: 1
FEVER: 0
FLANK PAIN: 0
DIZZINESS: 1
ARTHRALGIAS: 0
SHORTNESS OF BREATH: 0
PALPITATIONS: 1
MYALGIAS: 0
PHOTOPHOBIA: 0
VOMITING: 0
TREMORS: 0
LIGHT-HEADEDNESS: 1
CHILLS: 0
DYSURIA: 0
COUGH: 0
PSYCHIATRIC NEGATIVE: 1
NAUSEA: 0
WEAKNESS: 0
EYES NEGATIVE: 1
HEADACHES: 0

## 2024-05-21 ASSESSMENT — PAIN - FUNCTIONAL ASSESSMENT: PAIN_FUNCTIONAL_ASSESSMENT: 0-10

## 2024-05-21 ASSESSMENT — PAIN DESCRIPTION - ORIENTATION: ORIENTATION: LEFT

## 2024-05-21 ASSESSMENT — PAIN SCALES - GENERAL
PAINLEVEL_OUTOF10: 5 - MODERATE PAIN
PAINLEVEL_OUTOF10: 5 - MODERATE PAIN

## 2024-05-21 ASSESSMENT — COLUMBIA-SUICIDE SEVERITY RATING SCALE - C-SSRS
6. HAVE YOU EVER DONE ANYTHING, STARTED TO DO ANYTHING, OR PREPARED TO DO ANYTHING TO END YOUR LIFE?: NO
2. HAVE YOU ACTUALLY HAD ANY THOUGHTS OF KILLING YOURSELF?: NO
1. IN THE PAST MONTH, HAVE YOU WISHED YOU WERE DEAD OR WISHED YOU COULD GO TO SLEEP AND NOT WAKE UP?: NO

## 2024-05-21 ASSESSMENT — PAIN DESCRIPTION - LOCATION: LOCATION: ARM

## 2024-05-21 NOTE — ED PROVIDER NOTES
Chief Complaint: PALPITATIONS    53-year-old male who presents complaining some palpitations he still is he felt dizzy with these palpitations felt like he was going to pass out he denied any chest pain or shortness of breath.  Patient has had a cardiac cath that was completely normal approximately a month ago.  He denies any fever or chills no melanotic stools or any other abnormalities at this time.  He states he is scheduled for her monitor later this month.           Review of Systems   Constitutional:  Negative for chills and fever.   HENT: Negative.     Eyes: Negative.  Negative for photophobia.   Respiratory:  Negative for cough and shortness of breath.    Cardiovascular:  Positive for palpitations. Negative for chest pain and leg swelling.   Gastrointestinal:  Negative for nausea and vomiting.   Genitourinary:  Negative for dysuria and flank pain.   Musculoskeletal:  Negative for arthralgias and myalgias.   Skin:  Negative for rash.   Neurological:  Positive for dizziness and light-headedness. Negative for tremors, weakness and headaches.   Hematological: Negative.    Psychiatric/Behavioral: Negative.     All other systems reviewed and are negative.       Physical Exam  Constitutional:       General: He is not in acute distress.     Appearance: Normal appearance. He is obese. He is not ill-appearing or toxic-appearing.   HENT:      Head: Normocephalic and atraumatic.      Nose: Nose normal.      Mouth/Throat:      Mouth: Mucous membranes are dry.      Pharynx: Oropharynx is clear.   Eyes:      Extraocular Movements: Extraocular movements intact.      Conjunctiva/sclera: Conjunctivae normal.      Pupils: Pupils are equal, round, and reactive to light.   Cardiovascular:      Rate and Rhythm: Normal rate. Rhythm irregular.      Pulses: Normal pulses.      Heart sounds: No murmur heard.  Pulmonary:      Effort: Pulmonary effort is normal. No respiratory distress.      Breath sounds: Normal breath sounds.    Abdominal:      General: Bowel sounds are normal. There is no distension.      Palpations: Abdomen is soft.      Tenderness: There is no abdominal tenderness.   Musculoskeletal:         General: No swelling or tenderness. Normal range of motion.      Cervical back: Normal range of motion and neck supple. No rigidity.      Right lower leg: No edema.      Left lower leg: No edema.   Skin:     General: Skin is warm and dry.      Capillary Refill: Capillary refill takes less than 2 seconds.      Findings: No erythema or rash.   Neurological:      General: No focal deficit present.      Mental Status: He is alert and oriented to person, place, and time.      Sensory: No sensory deficit.      Motor: No weakness.   Psychiatric:         Mood and Affect: Mood normal.         Behavior: Behavior normal.          Labs Reviewed   CBC WITH AUTO DIFFERENTIAL - Abnormal       Result Value    WBC 9.7      nRBC 0.0      RBC 5.34      Hemoglobin 14.3      Hematocrit 43.7      MCV 82      MCH 26.8      MCHC 32.7      RDW 17.5 (*)     Platelets 359      Neutrophils % 62.2      Immature Granulocytes %, Automated 0.3      Lymphocytes % 29.0      Monocytes % 6.4      Eosinophils % 1.5      Basophils % 0.6      Neutrophils Absolute 6.05      Immature Granulocytes Absolute, Automated 0.03      Lymphocytes Absolute 2.82      Monocytes Absolute 0.62      Eosinophils Absolute 0.15      Basophils Absolute 0.06     BASIC METABOLIC PANEL - Abnormal    Glucose 94      Sodium 135 (*)     Potassium 3.7      Chloride 103      Bicarbonate 24      Anion Gap 12      Urea Nitrogen 21      Creatinine 0.75      eGFR >90      Calcium 9.1     MAGNESIUM - Normal    Magnesium 1.70     TROPONIN I, HIGH SENSITIVITY - Normal    Troponin I, High Sensitivity 4      Narrative:     Less than 99th percentile of normal range cutoff-  Female and children under 18 years old <14 ng/L; Male <21 ng/L: Negative  Repeat testing should be performed if clinically indicated.      Female and children under 18 years old 14-50 ng/L; Male 21-50 ng/L:  Consistent with possible cardiac damage and possible increased clinical   risk. Serial measurements may help to assess extent of myocardial damage.     >50 ng/L: Consistent with cardiac damage, increased clinical risk and  myocardial infarction. Serial measurements may help assess extent of   myocardial damage.      NOTE: Children less than 1 year old may have higher baseline troponin   levels and results should be interpreted in conjunction with the overall   clinical context.     NOTE: Troponin I testing is performed using a different   testing methodology at [facility] than at other   [facility]. Direct result comparisons should only   be made within the same method.   B-TYPE NATRIURETIC PEPTIDE - Normal    BNP 64      Narrative:        <100 pg/mL - Heart failure unlikely  100-299 pg/mL - Intermediate probability of acute heart                  failure exacerbation. Correlate with clinical                  context and patient history.    >=300 pg/mL - Heart Failure likely. Correlate with clinical                  context and patient history.    BNP testing is performed using different testing methodology at [facility] than at other [facility]. Direct result comparisons should only be made within the same method.      PROTIME-INR - Normal    Protime 11.7      INR 1.0     APTT - Normal    aPTT 30      Narrative:     The APTT is no longer used for monitoring Unfractionated Heparin Therapy. For monitoring Heparin Therapy, use the Heparin Assay.   TSH WITH REFLEX TO FREE T4 IF ABNORMAL - Normal    Thyroid Stimulating Hormone 1.50      Narrative:     TSH testing is performed using different testing methodology at [facility] than at other [facility]. Direct result comparisons should only be made within the same method.          Holter Or Event Cardiac Monitor         XR chest 1 view    Final Result   No acute cardiopulmonary disease.        MACRO:   none        Signed by: Alice Salinas 5/21/2024 2:46 PM   Dictation workstation:   [id number]           Procedures     Medical Decision Making  [place] diagnose include A-fib a flutter SVT PACs PVCs.  EKG was performed which was normal his cardiac cath report was reviewed from last month which was also normal appropriate labs EKG and chest x-ray were ordered at this time.  Troponin was normal at 4 TSH was negative metabolic panel was normal BNP was 64 magnesium was 1.7 INR was 1.0 white count was 9.7 with a normal hemoglobin and hematocrit chest x-ray is clear.  Patient developed no arrhythmias or PVCs PACs while in the emergency department Holter monitor was applied and patient will follow-up with Dr. KEARNS his cardiologist    Amount and/or Complexity of Data Reviewed  ECG/medicine tests: independent interpretation performed.     Details: EKG showed sinus rhythm at 80/min there is no acute ST segment elevation depressions impression is normal twelve-lead EKG         Diagnoses as of 05/21/24 1644   Palpitations                    Carl Silva MD  05/21/24 1644

## 2024-05-22 ENCOUNTER — APPOINTMENT (OUTPATIENT)
Dept: CARDIOLOGY | Facility: HOSPITAL | Age: 54
End: 2024-05-22
Payer: COMMERCIAL

## 2024-05-25 LAB
ATRIAL RATE: 80 BPM
P AXIS: 62 DEGREES
P OFFSET: 188 MS
P ONSET: 124 MS
PR INTERVAL: 168 MS
Q ONSET: 208 MS
QRS COUNT: 13 BEATS
QRS DURATION: 94 MS
QT INTERVAL: 372 MS
QTC CALCULATION(BAZETT): 429 MS
QTC FREDERICIA: 409 MS
R AXIS: 32 DEGREES
T AXIS: 50 DEGREES
T OFFSET: 394 MS
VENTRICULAR RATE: 80 BPM

## 2024-05-31 ENCOUNTER — OFFICE VISIT (OUTPATIENT)
Dept: CARDIOLOGY | Facility: CLINIC | Age: 54
End: 2024-05-31
Payer: COMMERCIAL

## 2024-05-31 VITALS
BODY MASS INDEX: 40.43 KG/M2 | OXYGEN SATURATION: 96 % | WEIGHT: 315 LBS | HEART RATE: 80 BPM | DIASTOLIC BLOOD PRESSURE: 72 MMHG | SYSTOLIC BLOOD PRESSURE: 118 MMHG | HEIGHT: 74 IN

## 2024-05-31 DIAGNOSIS — E11.59 HYPERTENSION ASSOCIATED WITH DIABETES (MULTI): ICD-10-CM

## 2024-05-31 DIAGNOSIS — R42 DIZZINESS: ICD-10-CM

## 2024-05-31 DIAGNOSIS — I15.2 HYPERTENSION ASSOCIATED WITH DIABETES (MULTI): ICD-10-CM

## 2024-05-31 DIAGNOSIS — F17.200 SMOKING: Primary | ICD-10-CM

## 2024-05-31 LAB — BODY SURFACE AREA: 2.75 M2

## 2024-05-31 PROCEDURE — 93000 ELECTROCARDIOGRAM COMPLETE: CPT | Performed by: STUDENT IN AN ORGANIZED HEALTH CARE EDUCATION/TRAINING PROGRAM

## 2024-05-31 PROCEDURE — 99214 OFFICE O/P EST MOD 30 MIN: CPT | Performed by: STUDENT IN AN ORGANIZED HEALTH CARE EDUCATION/TRAINING PROGRAM

## 2024-05-31 PROCEDURE — 3048F LDL-C <100 MG/DL: CPT | Performed by: STUDENT IN AN ORGANIZED HEALTH CARE EDUCATION/TRAINING PROGRAM

## 2024-05-31 PROCEDURE — 3078F DIAST BP <80 MM HG: CPT | Performed by: STUDENT IN AN ORGANIZED HEALTH CARE EDUCATION/TRAINING PROGRAM

## 2024-05-31 PROCEDURE — 3074F SYST BP LT 130 MM HG: CPT | Performed by: STUDENT IN AN ORGANIZED HEALTH CARE EDUCATION/TRAINING PROGRAM

## 2024-05-31 PROCEDURE — 3008F BODY MASS INDEX DOCD: CPT | Performed by: STUDENT IN AN ORGANIZED HEALTH CARE EDUCATION/TRAINING PROGRAM

## 2024-05-31 PROCEDURE — 4004F PT TOBACCO SCREEN RCVD TLK: CPT | Performed by: STUDENT IN AN ORGANIZED HEALTH CARE EDUCATION/TRAINING PROGRAM

## 2024-05-31 PROCEDURE — 93244 EXT ECG>48HR<7D REV&INTERPJ: CPT | Performed by: STUDENT IN AN ORGANIZED HEALTH CARE EDUCATION/TRAINING PROGRAM

## 2024-05-31 PROCEDURE — 4010F ACE/ARB THERAPY RXD/TAKEN: CPT | Performed by: STUDENT IN AN ORGANIZED HEALTH CARE EDUCATION/TRAINING PROGRAM

## 2024-05-31 PROCEDURE — 3044F HG A1C LEVEL LT 7.0%: CPT | Performed by: STUDENT IN AN ORGANIZED HEALTH CARE EDUCATION/TRAINING PROGRAM

## 2024-05-31 RX ORDER — METOPROLOL SUCCINATE 25 MG/1
100 TABLET, EXTENDED RELEASE ORAL DAILY
Qty: 180 TABLET | Refills: 3 | Status: SHIPPED | OUTPATIENT
Start: 2024-05-31 | End: 2024-06-11 | Stop reason: ALTCHOICE

## 2024-05-31 NOTE — PROGRESS NOTES
Chief Complaint   Patient presents with    Follow-up     HEART FLUTTERS AND DIZZINESS       HPI:  I was requested by Dr. Bryant to evaluate this patient in consultation for cardiac assessment.     Patient 53-year-old current smoker male with prior medical history significant for hypertension, hyperlipidemia, morbid obesity, smoking and abnormal stress test in 9/2020 followed by normal left heart catheterization. Currently the patient was complaining of some chest discomfort and shortness of breath that have been happening for the past 3 months. He notes that the chest discomfort is somewhat random and last for hours at a time. The shortness of breath is principally with exertion and can get worse. He also notes some orthopnea. Denies any PND or lower extremity edema. He underwent Left Heart Catheterization on April/2023 through R radial artery that showed non-obstructive coronary artery disease.   EKG showing normal sinus rhythm and no abnormalities.  Echo showing normal LVEF 65% with no wall motion abnormalities. Mild asymmetric left ventricular hypertrophy  US doppler carotids showing Heterogenous non-obstructive plaque R carotid artery  LHC (04/2023) showing normal coronaries     Patient returned for last appointment feeling good. He had lost 20 lb after the LHC. Total weight lost of ~100 lb (430 --> 329). He has improved his diet and is taking Mounjaro. He is currently asymptomatic.     He presented to ED [facility] on 05/21/2024 complaining of palpitations associated with dizziness and felt like he was going to faint. He denied chest pain, shortness of breath, leg edema, fever, chills, orthopnea, paroxysmal nocturnal dyspnea or syncope.    7-day holter monitor:  *The predominant rhythm was sinus.  *The Maximum Heart Rate recorded was 137 bpm, 05/25 12:41:40, the Minimum Heart Rate recorded was 48 bpm, 05/28 02:09:15, and the Average Heart Rate was 75 bpm.  *There were 11 VE beats with a burden of <1  %.  *There were 229 SVE beats with a burden of <1 %. There were 6 occurrences of Supraventricular Tachycardia with the Fastest episode 129 bpm, 05/23 01:28:58, and the Longest episode 4 beats, 05/23 01:28:51.  *There were 0 Patient Triggers.  *No signs of atrial fibrillation, atrial flutter, sinus pauses or malignant arrhythmias      Past Medical History  Past Medical History:   Diagnosis Date    Encounter for screening for malignant neoplasm of prostate 12/04/2020    Screening PSA (prostate specific antigen)    Immunization not carried out because of patient refusal     Influenza vaccination declined       Past Surgical History  Past Surgical History:   Procedure Laterality Date    APPENDECTOMY      CARDIAC CATHETERIZATION  09/23/2020    CARDIAC CATHETERIZATION  04/2023    CHOLECYSTECTOMY      COLONOSCOPY  07/2014    DR. GONZALEZ - REPEAT 3 YEARS. H/O POLYP, PRE-CANCER CELLS    TONSILLECTOMY         Past Family History  Family History   Problem Relation Name Age of Onset    Breast cancer Mother      No Known Problems Father      Coronary artery disease Other GRANDPARENT     Hyperlipidemia Other GRANDPARENT     Hypertension Other GRANDPARENT     Heart attack Other GRANDPARENT        Allergy History  Allergies   Allergen Reactions    Amlodipine Swelling    Metformin Diarrhea       Past Social History  Social History     Socioeconomic History    Marital status:      Spouse name: None    Number of children: None    Years of education: None    Highest education level: None   Occupational History    None   Tobacco Use    Smoking status: Every Day     Current packs/day: 0.50     Types: Cigarettes    Smokeless tobacco: Never   Vaping Use    Vaping status: Never Used   Substance and Sexual Activity    Alcohol use: Not Currently    Drug use: Never    Sexual activity: None   Other Topics Concern    None   Social History Narrative    None     Social Determinants of Health     Financial Resource Strain: Not on file  "  Food Insecurity: Not on file   Transportation Needs: Not on file   Physical Activity: Not on file   Stress: Not on file   Social Connections: Not on file   Intimate Partner Violence: Not on file   Housing Stability: Not on file       Social History     Tobacco Use   Smoking Status Every Day    Current packs/day: 0.50    Types: Cigarettes   Smokeless Tobacco Never       Review of Systems:  Constitutional:  Negative for chills and fever.   HENT: Negative.     Eyes: Negative.  Negative for photophobia.   Respiratory:  Negative for cough and shortness of breath.    Cardiovascular:  Positive for palpitations. Negative for chest pain and leg swelling.   Gastrointestinal:  Negative for nausea and vomiting.   Genitourinary:  Negative for dysuria and flank pain.   Musculoskeletal:  Negative for arthralgias and myalgias.   Skin:  Negative for rash.   Neurological:  Positive for dizziness and light-headedness. Negative for tremors, weakness and headaches.   Hematological: Negative.    Psychiatric/Behavioral: Negative.     All other systems reviewed and are negative.    Objective Data:  Last Recorded Vitals:  Vitals:    05/31/24 1405   BP: 118/72   Pulse: 80   SpO2: 96%   Weight: 147 kg (323 lb)   Height: 1.88 m (6' 2\")       Last Labs:  CBC - 5/21/2024:  2:35 PM  9.7 14.3 359    43.7      CMP - 5/21/2024:  2:35 PM  9.1 6.9 10 --- 0.5   _ 4.3 14 67      PTT - 5/21/2024:  2:35 PM  1.0   11.7 30     TROPHS   Date/Time Value Ref Range Status   05/21/2024 02:35 PM 4 0 - 20 ng/L Final   04/12/2023 01:14 PM 8 0 - 20 ng/L Final     Comment:     .  Less than 99th percentile of normal range cutoff-  Female and children under 18 years old <14 ng/L; Male <21 ng/L: Negative  Repeat testing should be performed if clinically indicated.   .  Female and children under 18 years old 14-50 ng/L; Male 21-50 ng/L:  Consistent with possible cardiac damage and possible increased clinical   risk. Serial measurements may help to assess extent of " myocardial damage.   .  >50 ng/L: Consistent with cardiac damage, increased clinical risk and  myocardial infarction. Serial measurements may help assess extent of   myocardial damage.   .   NOTE: Children less than 1 year old may have higher baseline troponin   levels and results should be interpreted in conjunction with the overall   clinical context.   .  NOTE: Troponin I testing is performed using a different   testing methodology at [facility] than at other   [facility]. Direct result comparisons should only   be made within the same method.     04/12/2023 11:03 AM 8 0 - 20 ng/L Final     Comment:     .  Less than 99th percentile of normal range cutoff-  Female and children under 18 years old <14 ng/L; Male <21 ng/L: Negative  Repeat testing should be performed if clinically indicated.   .  Female and children under 18 years old 14-50 ng/L; Male 21-50 ng/L:  Consistent with possible cardiac damage and possible increased clinical   risk. Serial measurements may help to assess extent of myocardial damage.   .  >50 ng/L: Consistent with cardiac damage, increased clinical risk and  myocardial infarction. Serial measurements may help assess extent of   myocardial damage.   .   NOTE: Children less than 1 year old may have higher baseline troponin   levels and results should be interpreted in conjunction with the overall   clinical context.   .  NOTE: Troponin I testing is performed using a different   testing methodology at [facility] than at other   [facility]. Direct result comparisons should only   be made within the same method.     04/12/2023 09:02 AM 9 0 - 20 ng/L Final     Comment:     .  Less than 99th percentile of normal range cutoff-  Female and children under 18 years old <14 ng/L; Male <21 ng/L: Negative  Repeat testing should be performed if clinically indicated.   .  Female and children under 18 years old 14-50 ng/L; Male 21-50 ng/L:  Consistent with possible  cardiac damage and possible increased clinical   risk. Serial measurements may help to assess extent of myocardial damage.   .  >50 ng/L: Consistent with cardiac damage, increased clinical risk and  myocardial infarction. Serial measurements may help assess extent of   myocardial damage.   .   NOTE: Children less than 1 year old may have higher baseline troponin   levels and results should be interpreted in conjunction with the overall   clinical context.   .  NOTE: Troponin I testing is performed using a different   testing methodology at [facility] than at other   [facility]. Direct result comparisons should only   be made within the same method.       BNP   Date/Time Value Ref Range Status   05/21/2024 02:35 PM 64 0 - 99 pg/mL Final     HGBA1C   Date/Time Value Ref Range Status   02/06/2024 06:53 AM 5.6 see below % Final   08/04/2023 09:10 AM 6.0 % Final     Comment:          Diagnosis of Diabetes-Adults   Non-Diabetic: < or = 5.6%   Increased risk for developing diabetes: 5.7-6.4%   Diagnostic of diabetes: > or = 6.5%  .       Monitoring of Diabetes                Age (y)     Therapeutic Goal (%)   Adults:          >18           <7.0   Pediatrics:    13-18           <7.5                   7-12           <8.0                   0- 6            7.5-8.5   American Diabetes Association. Diabetes Care 33(S1), Jan 2010.   01/07/2023 08:59 AM 6.9 % Final     Comment:          Diagnosis of Diabetes-Adults   Non-Diabetic: < or = 5.6%   Increased risk for developing diabetes: 5.7-6.4%   Diagnostic of diabetes: > or = 6.5%  .       Monitoring of Diabetes                Age (y)     Therapeutic Goal (%)   Adults:          >18           <7.0   Pediatrics:    13-18           <7.5                   7-12           <8.0                   0- 6            7.5-8.5   American Diabetes Association. Diabetes Care 33(S1), Jan 2010.       LDLCALC   Date/Time Value Ref Range Status   02/06/2024 06:53 AM 66 <=99 mg/dL  Final     Comment:                                 Near   Borderline      AGE      Desirable  Optimal    High     High     Very High     0-19 Y     0 - 109     ---    110-129   >/= 130     ----    20-24 Y     0 - 119     ---    120-159   >/= 160     ----      >24 Y     0 -  99   100-129  130-159   160-189     >/=190       VLDL   Date/Time Value Ref Range Status   02/06/2024 06:53 AM 24 0 - 40 mg/dL Final   01/07/2023 08:59 AM 17 0 - 40 mg/dL Final   10/16/2021 08:47 AM 34 0 - 40 mg/dL Final   07/27/2020 03:12 PM 40 0 - 40 mg/dL Final        Patient Medications:  Outpatient Encounter Medications as of 5/31/2024   Medication Sig Dispense Refill    aspirin 81 mg EC tablet Take 1 tablet (81 mg) by mouth once daily. 90 tablet 3    atorvastatin (Lipitor) 40 mg tablet Take 1 tablet (40 mg) by mouth once daily at bedtime. 90 tablet 3    chlorthalidone (Hygroton) 25 mg tablet Take 1 tablet (25 mg) by mouth once daily. 90 tablet 3    clotrimazole (Lotrimin) 1 % cream Apply 1 Application topically 2 times a day.      cyclobenzaprine (Flexeril) 10 mg tablet Take 1 tablet (10 mg) by mouth 3 times a day as needed for muscle spasms. 90 tablet 3    fluticasone-umeclidin-vilanter (TRELEGY-ELLIPTA) 100-62.5-25 mcg blister with device Inhale 1 puff once daily. 3 each 3    imiquimod (Aldara) 5 % cream Apply 1 packet topically once daily.      ipratropium-albuteroL (Duo-Neb) 0.5-2.5 mg/3 mL nebulizer solution Take 3 mL by nebulization 4 times a day.      lisinopril 10 mg tablet Take 1 tablet (10 mg) by mouth once daily. 90 tablet 3    metoprolol succinate XL (Toprol-XL) 25 mg 24 hr tablet Take 2 tablets (50 mg) by mouth once daily. Do not crush or chew. 180 tablet 3    naproxen (Naprosyn) 250 mg tablet Take 1 tablet (250 mg) by mouth once daily as needed for mild pain (1 - 3).      NIFEdipine ER (Adalat CC) 30 mg 24 hr tablet Take 1 tablet (30 mg) by mouth once daily. 90 tablet 3    omeprazole (PriLOSEC) 40 mg DR capsule Take 1 capsule  (40 mg) by mouth once daily. 90 capsule 3    albuterol 90 mcg/actuation inhaler Inhale 2 puffs every 6 hours if needed for wheezing or shortness of breath. (Patient not taking: Reported on 5/21/2024) 18 g 11    predniSONE (Deltasone) 10 mg tablet Take 3 tablets (30 mg) by mouth once daily. (Patient not taking: Reported on 5/21/2024) 15 tablet 1    semaglutide (OZEMPIC) 1 mg/dose (4 mg/3 mL) pen injector Inject 1 mg under the skin 1 (one) time per week. (Patient not taking: Reported on 5/31/2024) 3 mL 11    tirzepatide (Mounjaro) 5 mg/0.5 mL pen injector Inject 5 mg under the skin 1 (one) time per week. (Patient not taking: Reported on 5/31/2024) 2 mL 5     No facility-administered encounter medications on file as of 5/31/2024.       Physical Exam:  General: alert, oriented and in no acute distress. Obesity  HEENT: NC/AT; EOMI; PERRLA, external ear is normal  Neck: supple; trachea midline; no masses; no JVD  Chest: clear breath sounds bilaterally; no wheezing  Cardio: regular rhythm, S1S2 normal, no murmurs  Abdomen: Soft, non-tender, non-distension, no organomegaly  Extremities: no clubbing/cyanosis/edema  Neuro: Grossly intact     Psychiatric: Normal mood and affect     Past Cardiology Results (Last 3 Years):  EKG:  ECG 12 lead (Clinic Performed) 05/31/2024      ECG 12 lead 05/21/2024    Echo:  Echo Results:  No results found for this or any previous visit from the past 365 days.     Cath:  No results found for this or any previous visit from the past 1095 days.  I have personally reviewed the test results and my impressions are:  Left Heart Catheterization on April/2023 through R radial artery that showed non-obstructive coronary artery disease.   EKG showing normal sinus rhythm and no abnormalities.  Echo showing normal LVEF 65% with no wall motion abnormalities. Mild asymmetric left ventricular hypertrophy  Heterogenous non-obstructive plaque R carotid artery  LHC (04/2023) showing normal coronaries     CV NCDR  CATHPCI V5 COLLECTION FORM   Stress Test:  No results found for this or any previous visit from the past 1095 days.    Cardiac Imaging:  No results found for this or any previous visit from the past 1095 days.       Assessment/Plan   In summary, Mr. Davis is a 53-year-old current everyday smoker male with prior medical history significant for hypertension, hyperlipidemia, obesity, smoking and abnormal stress test in 9/2020 followed by normal left heart catheterization. Currently the patient was complaining of some chest discomfort and shortness of breath that have been happening for the past 3 months. He underwent Left Heart Catheterization on April/2023 through R radial artery that showed non-obstructive coronary artery disease.      # Dizziness  - He presented [facility] ED [facility]24 complaining of palpitations associated with dizziness and felt like he was going to faint. He denied chest pain, shortness of breath, leg edema, fever, chills, orthopnea, paroxysmal nocturnal dyspnea or syncope.   - 7-day holter monitor:  *The predominant rhythm was sinus.  *The Maximum Heart Rate recorded was 137 bpm, 05/25 12:41:40, the Minimum Heart Rate recorded was 48 bpm, 05/28 02:09:15, and the Average Heart Rate was 75 bpm.  *There were 11 VE beats with a burden of <1 %.  *There were 229 SVE beats with a burden of <1 %. There were 6 occurrences of Supraventricular Tachycardia with the Fastest episode 129 bpm, 05/23 01:28:58, and the Longest episode 4 beats, 05/23 01:28:51.  *There were 0 Patient Triggers.  *No signs of atrial fibrillation, atrial flutter, sinus pauses or malignant arrhythmias  - Patient was reassured.  - Will increase Metoprolol succinate to 100mg daily.     # Atherosclerosis / S/P PCI to OM1 / Heterogenous non-obstructive plaque R carotid artery  - He underwent LHC on April/2023 through R radial artery that showed non-obstructive coronary artery disease.   - Good healing in the R radial access site.  Good radial pulse.  - No need for further coronary assessment at this point.  - Keep ASA 81mg daily and Atorvastatin 40mg daily.  -We had a thorough conversation regarding good habits, good diet and avoiding junk food. Advised for exercises. The patient also states that he has lost weight from 430 to 329 pounds during this year and he will keep it up. He is taking Mounjaro.  - HR 82bpm. Will increase Metoprolol succinate to 50mg daily.  - Follow up in 6 months.     # Hypertension  - BP controlled  - Echo showing normal LVEF 65% with mild asymmetric left ventricular hypertrophy  - Keep home medication     # T2DM  - Has lost 20lb in the past 3 months  - Keep Mounjaro, diabetic diet  - Counseling     # Hyperlipidemia  - Keep Atorvastatin 40mg daily.     # Smoking  - Advised to quit smoking. We have extensive conversation (~3min) about it and the patient is contemplative to stop.     We have discussed the most common side effects of the prescribed medications, indications, drug interactions, risks, complications, and alternatives of medications/therapeutics were explained and discussed. The patient has been requested to monitor closely for any untoward side effects or complications of medications. The patient has been strongly advised to be compliant with the recommendations, all the questions and concerns have been addressed. The patient has been also instructed to call, to return sooner or to go to the emergency department if symptoms persist or get worsen. The patient voiced understanding and denies any further questions at this time.     This note was transcribed using the Dragon Dictation system. There may be grammatical, punctuation, or verbiage errors that occur with voice recognition programs.     Counseling greater than 50% of visit regarding all cardiac issues.     Thank you, Dr. Bryant, for allowing me to participate in the care of this patient. Please reach me out if you have any questions or if you need any  clarifications regarding the patient's care.     Colby Higginbotahm MD  Cardiology

## 2024-06-06 ENCOUNTER — TELEPHONE (OUTPATIENT)
Dept: PRIMARY CARE | Facility: CLINIC | Age: 54
End: 2024-06-06
Payer: COMMERCIAL

## 2024-06-06 NOTE — TELEPHONE ENCOUNTER
PT WAS UPSET ABOUT RESCHEDULE, WAS WONDERING IF THEY COULD GET IN BEFORE THEIR APPOINTMENT ON 06/14/2024, HE SAID HE IS HAVING A LOT OF PROBLEMS.    PLEASE ADVISE!    THANK YOU,  JAVI DU

## 2024-06-07 ENCOUNTER — APPOINTMENT (OUTPATIENT)
Dept: PRIMARY CARE | Facility: CLINIC | Age: 54
End: 2024-06-07
Payer: COMMERCIAL

## 2024-06-11 ENCOUNTER — OFFICE VISIT (OUTPATIENT)
Dept: PRIMARY CARE | Facility: CLINIC | Age: 54
End: 2024-06-11
Payer: COMMERCIAL

## 2024-06-11 ENCOUNTER — TELEPHONE (OUTPATIENT)
Dept: PRIMARY CARE | Facility: CLINIC | Age: 54
End: 2024-06-11

## 2024-06-11 VITALS
WEIGHT: 315 LBS | HEART RATE: 67 BPM | DIASTOLIC BLOOD PRESSURE: 80 MMHG | SYSTOLIC BLOOD PRESSURE: 127 MMHG | OXYGEN SATURATION: 96 % | HEIGHT: 74 IN | BODY MASS INDEX: 40.43 KG/M2

## 2024-06-11 DIAGNOSIS — I15.2 HYPERTENSION ASSOCIATED WITH DIABETES (MULTI): Primary | ICD-10-CM

## 2024-06-11 DIAGNOSIS — I65.23 BILATERAL CAROTID ARTERY STENOSIS: ICD-10-CM

## 2024-06-11 DIAGNOSIS — E66.01 CLASS 3 SEVERE OBESITY DUE TO EXCESS CALORIES WITH SERIOUS COMORBIDITY AND BODY MASS INDEX (BMI) OF 40.0 TO 44.9 IN ADULT (MULTI): ICD-10-CM

## 2024-06-11 DIAGNOSIS — N40.1 BENIGN PROSTATIC HYPERPLASIA WITH URINARY FREQUENCY: ICD-10-CM

## 2024-06-11 DIAGNOSIS — E11.59 HYPERTENSION ASSOCIATED WITH DIABETES (MULTI): Primary | ICD-10-CM

## 2024-06-11 DIAGNOSIS — R35.0 BENIGN PROSTATIC HYPERPLASIA WITH URINARY FREQUENCY: ICD-10-CM

## 2024-06-11 PROCEDURE — 3048F LDL-C <100 MG/DL: CPT | Performed by: NURSE PRACTITIONER

## 2024-06-11 PROCEDURE — 3044F HG A1C LEVEL LT 7.0%: CPT | Performed by: NURSE PRACTITIONER

## 2024-06-11 PROCEDURE — 4004F PT TOBACCO SCREEN RCVD TLK: CPT | Performed by: NURSE PRACTITIONER

## 2024-06-11 PROCEDURE — 3008F BODY MASS INDEX DOCD: CPT | Performed by: NURSE PRACTITIONER

## 2024-06-11 PROCEDURE — 4010F ACE/ARB THERAPY RXD/TAKEN: CPT | Performed by: NURSE PRACTITIONER

## 2024-06-11 PROCEDURE — 99214 OFFICE O/P EST MOD 30 MIN: CPT | Performed by: NURSE PRACTITIONER

## 2024-06-11 PROCEDURE — 3079F DIAST BP 80-89 MM HG: CPT | Performed by: NURSE PRACTITIONER

## 2024-06-11 PROCEDURE — 3074F SYST BP LT 130 MM HG: CPT | Performed by: NURSE PRACTITIONER

## 2024-06-11 RX ORDER — TIRZEPATIDE 2.5 MG/.5ML
2.5 INJECTION, SOLUTION SUBCUTANEOUS
Qty: 2 ML | Refills: 5 | Status: SHIPPED | OUTPATIENT
Start: 2024-06-11

## 2024-06-11 RX ORDER — LISINOPRIL 2.5 MG/1
2.5 TABLET ORAL DAILY
Qty: 90 TABLET | Refills: 3 | Status: SHIPPED | OUTPATIENT
Start: 2024-06-11

## 2024-06-11 RX ORDER — TAMSULOSIN HYDROCHLORIDE 0.4 MG/1
0.4 CAPSULE ORAL NIGHTLY
Qty: 90 CAPSULE | Refills: 3 | Status: SHIPPED | OUTPATIENT
Start: 2024-06-11

## 2024-06-11 RX ORDER — METOPROLOL SUCCINATE 100 MG/1
100 TABLET, EXTENDED RELEASE ORAL DAILY
Qty: 90 TABLET | Refills: 3 | Status: SHIPPED | OUTPATIENT
Start: 2024-06-11 | End: 2025-06-11

## 2024-06-11 RX ORDER — TIRZEPATIDE 2.5 MG/.5ML
2.5 INJECTION, SOLUTION SUBCUTANEOUS
Qty: 2 ML | Refills: 5 | Status: SHIPPED | OUTPATIENT
Start: 2024-06-16 | End: 2024-06-11 | Stop reason: SDUPTHER

## 2024-06-11 ASSESSMENT — ENCOUNTER SYMPTOMS
PALPITATIONS: 0
CHOKING: 0
ABDOMINAL PAIN: 0
DIZZINESS: 0
UNEXPECTED WEIGHT CHANGE: 0
CONFUSION: 0
ARTHRALGIAS: 0
SEIZURES: 0
FREQUENCY: 1
COUGH: 0
SPEECH DIFFICULTY: 0
MYALGIAS: 0
EYE REDNESS: 0
WHEEZING: 0
WOUND: 0
FATIGUE: 0
CHEST TIGHTNESS: 0
DIFFICULTY URINATING: 0
SORE THROAT: 0
FLANK PAIN: 0
FEVER: 0
HEADACHES: 0
NERVOUS/ANXIOUS: 0
BLOOD IN STOOL: 0
ABDOMINAL DISTENTION: 0
NUMBNESS: 0
JOINT SWELLING: 0
PHOTOPHOBIA: 0
VOMITING: 0
CHILLS: 0
CONSTIPATION: 0
FACIAL ASYMMETRY: 0
HEMATURIA: 0
DIARRHEA: 0
TROUBLE SWALLOWING: 0
NECK PAIN: 0
BACK PAIN: 0
APNEA: 0
NAUSEA: 0
SLEEP DISTURBANCE: 0
DYSURIA: 0
WEAKNESS: 0
EYE PAIN: 0
SHORTNESS OF BREATH: 0

## 2024-06-11 NOTE — PROGRESS NOTES
"Subjective   Patient ID: Boy Davis Jr. is a 53 y.o. male who presents for Follow-up (ER 5/21/24;Palpitations).        HPI:  Presents today for [facility] ER FOR PALPITATIONS. NO NEW COMPLAINTS       UNABLE TO GET MOUNJARO 5 MG FROM PHARMACY. START 2.5 MG  PALPITATIONS- FOLLOWING WITH DR. KEARNS, CARDIO. SVT NOTED TO HOLTER MONITOR.   CAROTID STENOSIS- RECHECK DOPPLER  HTN- HE IS ON SEVERAL DIFFERENT BP MEDS. DECREASE LISINOPRIL TO 2.5 MG AND MONITOR BP. STOP CHORTHALIDONE AFTER 2 WEEKS, IF BP NOT ELEVATED. CONTINUE TO MONITOR   BPH- INCREASE FREQUENCY AT NIGHT. NO BURNING. NO HEMATURIA. HAS SEEN UROLOGY IN THE PAST. START FLOMAX.     Visit Vitals  /80 (BP Location: Right arm, Patient Position: Sitting)   Pulse 67   Ht 1.88 m (6' 2\")   Wt 149 kg (329 lb)   SpO2 96%   BMI 42.24 kg/m²   Smoking Status Every Day   BSA 2.79 m²        Review of Systems   Constitutional:  Negative for chills, fatigue, fever and unexpected weight change.   HENT:  Negative for congestion, ear pain, sore throat and trouble swallowing.    Eyes:  Negative for photophobia, pain, redness and visual disturbance.   Respiratory:  Negative for apnea, cough, choking, chest tightness, shortness of breath and wheezing.    Cardiovascular:  Negative for chest pain, palpitations and leg swelling.   Gastrointestinal:  Negative for abdominal distention, abdominal pain, blood in stool, constipation, diarrhea, nausea and vomiting.   Genitourinary:  Positive for frequency. Negative for difficulty urinating, dysuria, flank pain, hematuria and urgency.   Musculoskeletal:  Negative for arthralgias, back pain, gait problem, joint swelling, myalgias and neck pain.   Skin:  Negative for rash and wound.   Neurological:  Negative for dizziness, seizures, syncope, facial asymmetry, speech difficulty, weakness, numbness and headaches.   Psychiatric/Behavioral:  Negative for confusion, sleep disturbance and suicidal ideas. The patient is not nervous/anxious.  "       Objective     Physical Exam  Constitutional:       Appearance: Normal appearance. He is normal weight.   HENT:      Head: Normocephalic.   Eyes:      Extraocular Movements: Extraocular movements intact.      Conjunctiva/sclera: Conjunctivae normal.      Pupils: Pupils are equal, round, and reactive to light.   Cardiovascular:      Rate and Rhythm: Normal rate and regular rhythm.      Pulses: Normal pulses.      Heart sounds: Normal heart sounds.   Pulmonary:      Effort: Pulmonary effort is normal.      Breath sounds: Normal breath sounds.   Musculoskeletal:         General: Normal range of motion.      Cervical back: Normal range of motion.   Skin:     General: Skin is warm and dry.   Neurological:      General: No focal deficit present.      Mental Status: He is alert and oriented to person, place, and time.   Psychiatric:         Mood and Affect: Mood normal.         Behavior: Behavior normal.         Thought Content: Thought content normal.         Judgment: Judgment normal.            Assessment/Plan   Problem List Items Addressed This Visit       Benign prostatic hyperplasia with urinary frequency    Relevant Medications    tamsulosin (Flomax) 0.4 mg 24 hr capsule    Hypertension associated with diabetes (Multi) - Primary    Relevant Medications    tirzepatide (Mounjaro) 2.5 mg/0.5 mL pen injector    lisinopril 2.5 mg tablet    metoprolol succinate XL (Toprol XL) 100 mg 24 hr tablet    Class 3 severe obesity due to excess calories with serious comorbidity and body mass index (BMI) of 40.0 to 44.9 in adult (Multi)    Bilateral carotid artery stenosis    Relevant Orders    Vascular US Carotid Artery Duplex Bilateral       WE DISCUSSED MOST COMMON SIDE EFFECTS OF PRESCRIBED MEDICATIONS. INDICATIONS, RISK, COMPLICATIONS, AND ALTERNATIVES OF MEDICATION/THERAPEUTICS WERE EXPLAINED AND DISCUSSED. PLEASE MONITOR CLOSELY FOR ANY UNTOWARD SIDE EFFECTS OR COMPLICATIONS OF MEDICATIONS. PATIENT IS STRONGLY ADVISED TO  BE COMPLIANT WITH RECOMMENDATIONS. QUESTIONS AND CONCERNS WERE ADDRESSED. INSTRUCTED TO CALL, RETURN SOONER, OR GO TO THE ER,  IF SYMPTOMS PERSIST OR WORSEN. THEY VOICED UNDERSTANDING AND  DENIES FURTHER QUESTIONS AT THIS TIME.    TIME CODE  1. PREPARATION FOR PATIENT'S VISIT (REVIEWING CHART, CURRENT MEDICAL RECORDS, OUTSIDE HEALTH PROVIDER RECORDS, PREVIOUS HISTORY, EXAM, TEST, PROCEDURE, AND MEDICATIONS)  2. FACE TO FACE ENCOUNTER OBTAINING HISTORY FROM THE PATIENT/FAMILY/CAREGIVERS; PERFORMING EVALUATION AND EXAMINATION; ORDERING TESTS OR PROCEDURES; REFERRING AND COMMUNICATING WITH OTHER HEALTHCARE PROVIDERS; COUNSELING AND EDUCATION OF THE PATIENT/FAMILY/CAREGIVERS; INDEPENDENTLY INTERPRETING RESULTS (TESTS, LABS, PROCEDURES, IMAGING) AND COMMUNICATING AND EXPLAINING RESULTS TO THE PATIENT/FAMILY/CAREGIVERS  3. COORDINATION OF CARE; PREPARING AND PRINTING DISCHARGE INSTRUCTIONS AND ANY EDUCATIONAL MATERIAL FOR THE PATIENT/FAMILY/CAREGIVERS. DOCUMENTING CLINICAL INFORMATION IN THE ELECTRONIC MEDICAL RECORD   4. REVIEWING OARRS AS NEEDED    MDM  1) COMPLEXITY: MORE THAN 1 STABLE CHRONIC CONDITION ADDRESSED OR 1 ACUTE ILLNESS ADDRESSED   2)DATA: TESTS INTERPRETED AND OR ORDERED, TOOK INDEPENDENT HISTORY OR RECORDS REVIEWED  3)RISK: MODERATE RISK DUE TO NATURE OF MEDICAL CONDITIONS/COMORBIDITY OR MEDICATIONS ORDERED OR SURGICAL OR PROCEDURE REFERRAL    1 MONTH BP CHECK. BRING LOG

## 2024-06-12 ENCOUNTER — TELEPHONE (OUTPATIENT)
Dept: PRIMARY CARE | Facility: CLINIC | Age: 54
End: 2024-06-12
Payer: COMMERCIAL

## 2024-06-14 ENCOUNTER — APPOINTMENT (OUTPATIENT)
Dept: PRIMARY CARE | Facility: CLINIC | Age: 54
End: 2024-06-14
Payer: COMMERCIAL

## 2024-06-14 ENCOUNTER — HOSPITAL ENCOUNTER (OUTPATIENT)
Dept: VASCULAR MEDICINE | Facility: HOSPITAL | Age: 54
Discharge: HOME | End: 2024-06-14
Payer: COMMERCIAL

## 2024-06-14 DIAGNOSIS — I65.23 BILATERAL CAROTID ARTERY STENOSIS: ICD-10-CM

## 2024-06-14 DIAGNOSIS — R09.89 OTHER SPECIFIED SYMPTOMS AND SIGNS INVOLVING THE CIRCULATORY AND RESPIRATORY SYSTEMS: ICD-10-CM

## 2024-06-14 PROCEDURE — 93880 EXTRACRANIAL BILAT STUDY: CPT | Performed by: INTERNAL MEDICINE

## 2024-06-14 PROCEDURE — 93880 EXTRACRANIAL BILAT STUDY: CPT

## 2024-07-08 DIAGNOSIS — J44.9 CHRONIC OBSTRUCTIVE PULMONARY DISEASE, UNSPECIFIED COPD TYPE (MULTI): ICD-10-CM

## 2024-07-12 ENCOUNTER — TELEPHONE (OUTPATIENT)
Dept: PRIMARY CARE | Facility: CLINIC | Age: 54
End: 2024-07-12

## 2024-07-12 ENCOUNTER — APPOINTMENT (OUTPATIENT)
Dept: PRIMARY CARE | Facility: CLINIC | Age: 54
End: 2024-07-12
Payer: COMMERCIAL

## 2024-07-12 DIAGNOSIS — J44.9 CHRONIC OBSTRUCTIVE PULMONARY DISEASE, UNSPECIFIED COPD TYPE (MULTI): Primary | ICD-10-CM

## 2024-08-09 ENCOUNTER — HOSPITAL ENCOUNTER (OUTPATIENT)
Dept: CARDIOLOGY | Facility: HOSPITAL | Age: 54
Discharge: HOME | End: 2024-08-09
Payer: COMMERCIAL

## 2024-08-09 ENCOUNTER — APPOINTMENT (OUTPATIENT)
Dept: PRIMARY CARE | Facility: CLINIC | Age: 54
End: 2024-08-09
Payer: COMMERCIAL

## 2024-08-09 DIAGNOSIS — I70.90 ATHEROSCLEROSIS: ICD-10-CM

## 2024-08-09 PROCEDURE — 93225 XTRNL ECG REC<48 HRS REC: CPT

## 2024-08-12 ENCOUNTER — LAB (OUTPATIENT)
Dept: LAB | Facility: LAB | Age: 54
End: 2024-08-12
Payer: COMMERCIAL

## 2024-08-12 DIAGNOSIS — E11.69 MIXED HYPERLIPIDEMIA DUE TO TYPE 2 DIABETES MELLITUS (MULTI): ICD-10-CM

## 2024-08-12 DIAGNOSIS — E78.2 MIXED HYPERLIPIDEMIA DUE TO TYPE 2 DIABETES MELLITUS (MULTI): ICD-10-CM

## 2024-08-12 LAB
ALBUMIN SERPL BCP-MCNC: 4.1 G/DL (ref 3.4–5)
ALP SERPL-CCNC: 54 U/L (ref 33–120)
ALT SERPL W P-5'-P-CCNC: 13 U/L (ref 10–52)
ANION GAP SERPL CALC-SCNC: 11 MMOL/L (ref 10–20)
AST SERPL W P-5'-P-CCNC: 11 U/L (ref 9–39)
BASOPHILS # BLD AUTO: 0.09 X10*3/UL (ref 0–0.1)
BASOPHILS NFR BLD AUTO: 1.1 %
BILIRUB SERPL-MCNC: 0.3 MG/DL (ref 0–1.2)
BUN SERPL-MCNC: 32 MG/DL (ref 6–23)
CALCIUM SERPL-MCNC: 9.3 MG/DL (ref 8.6–10.3)
CHLORIDE SERPL-SCNC: 111 MMOL/L (ref 98–107)
CO2 SERPL-SCNC: 26 MMOL/L (ref 21–32)
CREAT SERPL-MCNC: 0.97 MG/DL (ref 0.5–1.3)
EGFRCR SERPLBLD CKD-EPI 2021: >90 ML/MIN/1.73M*2
EOSINOPHIL # BLD AUTO: 0.32 X10*3/UL (ref 0–0.7)
EOSINOPHIL NFR BLD AUTO: 3.8 %
ERYTHROCYTE [DISTWIDTH] IN BLOOD BY AUTOMATED COUNT: 15.6 % (ref 11.5–14.5)
EST. AVERAGE GLUCOSE BLD GHB EST-MCNC: 103 MG/DL
GLUCOSE SERPL-MCNC: 99 MG/DL (ref 74–99)
HBA1C MFR BLD: 5.2 %
HCT VFR BLD AUTO: 46.8 % (ref 41–52)
HGB BLD-MCNC: 14.8 G/DL (ref 13.5–17.5)
IMM GRANULOCYTES # BLD AUTO: 0.04 X10*3/UL (ref 0–0.7)
IMM GRANULOCYTES NFR BLD AUTO: 0.5 % (ref 0–0.9)
LYMPHOCYTES # BLD AUTO: 2.17 X10*3/UL (ref 1.2–4.8)
LYMPHOCYTES NFR BLD AUTO: 26 %
MCH RBC QN AUTO: 26.9 PG (ref 26–34)
MCHC RBC AUTO-ENTMCNC: 31.6 G/DL (ref 32–36)
MCV RBC AUTO: 85 FL (ref 80–100)
MONOCYTES # BLD AUTO: 0.68 X10*3/UL (ref 0.1–1)
MONOCYTES NFR BLD AUTO: 8.1 %
NEUTROPHILS # BLD AUTO: 5.05 X10*3/UL (ref 1.2–7.7)
NEUTROPHILS NFR BLD AUTO: 60.5 %
NRBC BLD-RTO: 0 /100 WBCS (ref 0–0)
PLATELET # BLD AUTO: 382 X10*3/UL (ref 150–450)
POTASSIUM SERPL-SCNC: 4.5 MMOL/L (ref 3.5–5.3)
PROT SERPL-MCNC: 6.4 G/DL (ref 6.4–8.2)
RBC # BLD AUTO: 5.51 X10*6/UL (ref 4.5–5.9)
SODIUM SERPL-SCNC: 143 MMOL/L (ref 136–145)
TSH SERPL-ACNC: 1.68 MIU/L (ref 0.44–3.98)
WBC # BLD AUTO: 8.4 X10*3/UL (ref 4.4–11.3)

## 2024-08-12 PROCEDURE — 84443 ASSAY THYROID STIM HORMONE: CPT

## 2024-08-12 PROCEDURE — 36415 COLL VENOUS BLD VENIPUNCTURE: CPT

## 2024-08-12 PROCEDURE — 83036 HEMOGLOBIN GLYCOSYLATED A1C: CPT

## 2024-08-12 PROCEDURE — 80053 COMPREHEN METABOLIC PANEL: CPT

## 2024-08-12 PROCEDURE — 85025 COMPLETE CBC W/AUTO DIFF WBC: CPT

## 2024-08-15 ENCOUNTER — APPOINTMENT (OUTPATIENT)
Dept: PRIMARY CARE | Facility: CLINIC | Age: 54
End: 2024-08-15
Payer: COMMERCIAL

## 2024-08-15 VITALS
OXYGEN SATURATION: 96 % | HEIGHT: 74 IN | BODY MASS INDEX: 40.43 KG/M2 | WEIGHT: 315 LBS | DIASTOLIC BLOOD PRESSURE: 79 MMHG | SYSTOLIC BLOOD PRESSURE: 119 MMHG | HEART RATE: 62 BPM

## 2024-08-15 DIAGNOSIS — K21.9 GASTROESOPHAGEAL REFLUX DISEASE, UNSPECIFIED WHETHER ESOPHAGITIS PRESENT: ICD-10-CM

## 2024-08-15 DIAGNOSIS — E66.01 CLASS 3 SEVERE OBESITY DUE TO EXCESS CALORIES WITH SERIOUS COMORBIDITY AND BODY MASS INDEX (BMI) OF 40.0 TO 44.9 IN ADULT (MULTI): ICD-10-CM

## 2024-08-15 DIAGNOSIS — Z12.5 SCREENING PSA (PROSTATE SPECIFIC ANTIGEN): ICD-10-CM

## 2024-08-15 DIAGNOSIS — E78.2 MIXED HYPERLIPIDEMIA DUE TO TYPE 2 DIABETES MELLITUS (MULTI): ICD-10-CM

## 2024-08-15 DIAGNOSIS — E11.59 HYPERTENSION ASSOCIATED WITH DIABETES (MULTI): Primary | ICD-10-CM

## 2024-08-15 DIAGNOSIS — I65.23 BILATERAL CAROTID ARTERY STENOSIS: ICD-10-CM

## 2024-08-15 DIAGNOSIS — I15.2 HYPERTENSION ASSOCIATED WITH DIABETES (MULTI): Primary | ICD-10-CM

## 2024-08-15 DIAGNOSIS — E11.69 MIXED HYPERLIPIDEMIA DUE TO TYPE 2 DIABETES MELLITUS (MULTI): ICD-10-CM

## 2024-08-15 PROCEDURE — 3074F SYST BP LT 130 MM HG: CPT | Performed by: NURSE PRACTITIONER

## 2024-08-15 PROCEDURE — 3044F HG A1C LEVEL LT 7.0%: CPT | Performed by: NURSE PRACTITIONER

## 2024-08-15 PROCEDURE — 3048F LDL-C <100 MG/DL: CPT | Performed by: NURSE PRACTITIONER

## 2024-08-15 PROCEDURE — 4010F ACE/ARB THERAPY RXD/TAKEN: CPT | Performed by: NURSE PRACTITIONER

## 2024-08-15 PROCEDURE — 3078F DIAST BP <80 MM HG: CPT | Performed by: NURSE PRACTITIONER

## 2024-08-15 PROCEDURE — 4004F PT TOBACCO SCREEN RCVD TLK: CPT | Performed by: NURSE PRACTITIONER

## 2024-08-15 PROCEDURE — 99214 OFFICE O/P EST MOD 30 MIN: CPT | Performed by: NURSE PRACTITIONER

## 2024-08-15 PROCEDURE — 3008F BODY MASS INDEX DOCD: CPT | Performed by: NURSE PRACTITIONER

## 2024-08-15 RX ORDER — ASPIRIN 81 MG/1
81 TABLET ORAL DAILY
Qty: 90 TABLET | Refills: 3 | Status: SHIPPED | OUTPATIENT
Start: 2024-08-15

## 2024-08-15 RX ORDER — TIRZEPATIDE 2.5 MG/.5ML
2.5 INJECTION, SOLUTION SUBCUTANEOUS
Qty: 2 ML | Refills: 11 | Status: SHIPPED | OUTPATIENT
Start: 2024-08-18

## 2024-08-15 RX ORDER — OMEPRAZOLE 40 MG/1
40 CAPSULE, DELAYED RELEASE ORAL DAILY
Qty: 90 CAPSULE | Refills: 3 | Status: SHIPPED | OUTPATIENT
Start: 2024-08-15

## 2024-08-15 ASSESSMENT — ENCOUNTER SYMPTOMS
CHOKING: 0
COUGH: 0
CONSTIPATION: 0
SEIZURES: 0
ABDOMINAL PAIN: 0
WHEEZING: 0
CHILLS: 0
EYE PAIN: 0
FREQUENCY: 0
WEAKNESS: 0
DIARRHEA: 0
HEADACHES: 0
PALPITATIONS: 0
MYALGIAS: 0
BACK PAIN: 0
SORE THROAT: 0
CONFUSION: 0
SHORTNESS OF BREATH: 0
UNEXPECTED WEIGHT CHANGE: 0
WOUND: 0
EYE REDNESS: 0
BLOOD IN STOOL: 0
VOMITING: 0
JOINT SWELLING: 0
HEMATURIA: 0
NERVOUS/ANXIOUS: 0
TROUBLE SWALLOWING: 0
FACIAL ASYMMETRY: 0
PHOTOPHOBIA: 0
DIZZINESS: 0
DYSURIA: 0
NAUSEA: 0
NECK PAIN: 0
CHEST TIGHTNESS: 0
ABDOMINAL DISTENTION: 0
APNEA: 0
SPEECH DIFFICULTY: 0
ARTHRALGIAS: 0
SLEEP DISTURBANCE: 0
DIFFICULTY URINATING: 0
FATIGUE: 0
NUMBNESS: 0
FLANK PAIN: 0
FEVER: 0

## 2024-08-15 ASSESSMENT — PATIENT HEALTH QUESTIONNAIRE - PHQ9
1. LITTLE INTEREST OR PLEASURE IN DOING THINGS: NOT AT ALL
SUM OF ALL RESPONSES TO PHQ9 QUESTIONS 1 AND 2: 0
2. FEELING DOWN, DEPRESSED OR HOPELESS: NOT AT ALL

## 2024-08-15 NOTE — PROGRESS NOTES
"Subjective   Patient ID: Boy Davis Jr. is a 54 y.o. male who presents for Follow-up (6 months labs).      HPI:  Presents today for 6 MONTHS [facility] LABS. NO NEW COMPLAINTS        DM- STABLE  COPD- STABLE  HTN- STABLE    Visit Vitals  /79 (BP Location: Left arm, Patient Position: Sitting)   Pulse 62   Ht 1.88 m (6' 2\")   Wt 149 kg (329 lb)   SpO2 96%   BMI 42.24 kg/m²   Smoking Status Every Day   BSA 2.79 m²        Review of Systems   Constitutional:  Negative for chills, fatigue, fever and unexpected weight change.   HENT:  Negative for congestion, ear pain, sore throat and trouble swallowing.    Eyes:  Negative for photophobia, pain, redness and visual disturbance.   Respiratory:  Negative for apnea, cough, choking, chest tightness, shortness of breath and wheezing.    Cardiovascular:  Negative for chest pain, palpitations and leg swelling.   Gastrointestinal:  Negative for abdominal distention, abdominal pain, blood in stool, constipation, diarrhea, nausea and vomiting.   Genitourinary:  Negative for difficulty urinating, dysuria, flank pain, frequency, hematuria and urgency.   Musculoskeletal:  Negative for arthralgias, back pain, gait problem, joint swelling, myalgias and neck pain.   Skin:  Negative for rash and wound.   Neurological:  Negative for dizziness, seizures, syncope, facial asymmetry, speech difficulty, weakness, numbness and headaches.   Psychiatric/Behavioral:  Negative for confusion, sleep disturbance and suicidal ideas. The patient is not nervous/anxious.        Objective   Component      Latest Ref Rn 8/12/2024   LEUKOCYTES (10*3/UL) IN BLOOD BY AUTOMATED COUNT, [place]      4.4 - 11.3 x10*3/uL 8.4    nRBC      0.0 - 0.0 /100 WBCs 0.0    ERYTHROCYTES (10*6/UL) IN BLOOD BY AUTOMATED COUNT, [place]      4.50 - 5.90 x10*6/uL 5.51    HEMOGLOBIN      13.5 - 17.5 g/dL 14.8    HEMATOCRIT      41.0 - 52.0 % 46.8    MCV      80 - 100 fL 85    MCH      26.0 - 34.0 pg 26.9    MCHC      32.0 - " 36.0 g/dL 31.6 (L)    RED CELL DISTRIBUTION WIDTH      11.5 - 14.5 % 15.6 (H)    PLATELETS (10*3/UL) IN BLOOD AUTOMATED COUNT, [place]      150 - 450 x10*3/uL 382    NEUTROPHILS/100 LEUKOCYTES IN BLOOD BY AUTOMATED COUNT, [place]      40.0 - 80.0 % 60.5    Immature Granulocytes %, Automated      0.0 - 0.9 % 0.5    Lymphocytes %      13.0 - 44.0 % 26.0    Monocytes %      2.0 - 10.0 % 8.1    Eosinophils %      0.0 - 6.0 % 3.8    Basophils %      0.0 - 2.0 % 1.1    NEUTROPHILS (10*3/UL) IN BLOOD BY AUTOMATED COUNT, [place]      1.20 - 7.70 x10*3/uL 5.05    Immature Granulocytes Absolute, Automated      0.00 - 0.70 x10*3/uL 0.04    Lymphocytes Absolute      1.20 - 4.80 x10*3/uL 2.17    Monocytes Absolute      0.10 - 1.00 x10*3/uL 0.68    Eosinophils Absolute      0.00 - 0.70 x10*3/uL 0.32    Basophils Absolute      0.00 - 0.10 x10*3/uL 0.09    GLUCOSE      74 - 99 mg/dL 99    SODIUM      136 - 145 mmol/L 143    POTASSIUM      3.5 - 5.3 mmol/L 4.5    CHLORIDE      98 - 107 mmol/L 111 (H)    Bicarbonate      21 - 32 mmol/L 26    Anion Gap      10 - 20 mmol/L 11    Blood Urea Nitrogen      6 - 23 mg/dL 32 (H)    Creatinine      0.50 - 1.30 mg/dL 0.97    EGFR      >60 mL/min/1.73m*2 >90    Calcium      8.6 - 10.3 mg/dL 9.3    Albumin      3.4 - 5.0 g/dL 4.1    Alkaline Phosphatase      33 - 120 U/L 54    Total Protein      6.4 - 8.2 g/dL 6.4    AST      9 - 39 U/L 11    Bilirubin Total      0.0 - 1.2 mg/dL 0.3    ALT      10 - 52 U/L 13    Hemoglobin A1C      see below % 5.2    Estimated Average Glucose      Not Established mg/dL 103    Thyroid Stimulating Hormone      0.44 - 3.98 mIU/L 1.68       Legend:  (L) Low  (H) High  Physical Exam  Constitutional:       Appearance: Normal appearance. He is normal weight.   HENT:      Head: Normocephalic.   Eyes:      Extraocular Movements: Extraocular movements intact.      Conjunctiva/sclera: Conjunctivae normal.      Pupils: Pupils are equal, round, and reactive to light.    Cardiovascular:      Rate and Rhythm: Normal rate and regular rhythm.      Pulses: Normal pulses.      Heart sounds: Normal heart sounds.   Pulmonary:      Effort: Pulmonary effort is normal.      Breath sounds: Normal breath sounds.   Musculoskeletal:         General: Normal range of motion.      Cervical back: Normal range of motion.   Skin:     General: Skin is warm and dry.   Neurological:      General: No focal deficit present.      Mental Status: He is alert and oriented to person, place, and time.   Psychiatric:         Mood and Affect: Mood normal.         Behavior: Behavior normal.         Thought Content: Thought content normal.         Judgment: Judgment normal.            Assessment/Plan   Problem List Items Addressed This Visit       GERD (gastroesophageal reflux disease)    Relevant Medications    omeprazole (PriLOSEC) 40 mg DR capsule    Hypertension associated with diabetes (Multi) - Primary    Relevant Medications    tirzepatide (Mounjaro) 2.5 mg/0.5 mL pen injector (Start on 8/18/2024)    Mixed hyperlipidemia due to type 2 diabetes mellitus (Multi)    Relevant Orders    CBC and Auto Differential    Comprehensive Metabolic Panel    Hemoglobin A1C    Lipid Panel    TSH with reflex to Free T4 if abnormal    Class 3 severe obesity due to excess calories with serious comorbidity and body mass index (BMI) of 40.0 to 44.9 in adult (Multi)    Bilateral carotid artery stenosis    Relevant Medications    aspirin 81 mg EC tablet    Screening PSA (prostate specific antigen)    Relevant Orders    Prostate Specific Antigen, Screen   PLEASE MONITOR CLOSELY FOR ANY UNTOWARD SIDE EFFECTS OR COMPLICATIONS OF MEDICATIONS. PATIENT IS STRONGLY ADVISED TO BE COMPLIANT WITH RECOMMENDATIONS. QUESTIONS AND CONCERNS WERE ADDRESSED. INSTRUCTED TO CALL, RETURN SOONER, OR GO TO THE ER,  IF SYMPTOMS PERSIST OR WORSEN. THEY VOICED UNDERSTANDING AND  DENIES FURTHER QUESTIONS AT THIS TIME.    TIME CODE  1. PREPARATION FOR  PATIENT'S VISIT (REVIEWING CHART, CURRENT MEDICAL RECORDS, OUTSIDE HEALTH PROVIDER RECORDS, PREVIOUS HISTORY, EXAM, TEST, PROCEDURE, AND MEDICATIONS)  2. FACE TO FACE ENCOUNTER OBTAINING HISTORY FROM THE PATIENT/FAMILY/CAREGIVERS; PERFORMING EVALUATION AND EXAMINATION; ORDERING TESTS OR PROCEDURES; REFERRING AND COMMUNICATING WITH OTHER HEALTHCARE PROVIDERS; COUNSELING AND EDUCATION OF THE PATIENT/FAMILY/CAREGIVERS; INDEPENDENTLY INTERPRETING RESULTS (TESTS, LABS, PROCEDURES, IMAGING) AND COMMUNICATING AND EXPLAINING RESULTS TO THE PATIENT/FAMILY/CAREGIVERS  3. COORDINATION OF CARE; PREPARING AND PRINTING DISCHARGE INSTRUCTIONS AND ANY EDUCATIONAL MATERIAL FOR THE PATIENT/FAMILY/CAREGIVERS. DOCUMENTING CLINICAL INFORMATION IN THE ELECTRONIC MEDICAL RECORD   4. REVIEWING OARRS AS NEEDED    MDM  1) COMPLEXITY: MORE THAN 1 STABLE CHRONIC CONDITION ADDRESSED OR 1 ACUTE ILLNESS ADDRESSED   2)DATA: TESTS INTERPRETED AND OR ORDERED, TOOK INDEPENDENT HISTORY OR RECORDS REVIEWED  3)RISK: MODERATE RISK DUE TO NATURE OF MEDICAL CONDITIONS/COMORBIDITY OR MEDICATIONS ORDERED OR SURGICAL OR PROCEDURE REFERRAL    6 MONTHS WITH LABS

## 2024-08-19 DIAGNOSIS — I87.2 STASIS DERMATITIS, ACUTE: Primary | ICD-10-CM

## 2024-08-20 RX ORDER — CLOTRIMAZOLE 1 %
1 CREAM (GRAM) TOPICAL 2 TIMES DAILY
Qty: 45 G | Refills: 0 | Status: SHIPPED | OUTPATIENT
Start: 2024-08-20

## 2024-08-27 ENCOUNTER — APPOINTMENT (OUTPATIENT)
Dept: CARDIOLOGY | Facility: CLINIC | Age: 54
End: 2024-08-27
Payer: COMMERCIAL

## 2024-08-29 ENCOUNTER — APPOINTMENT (OUTPATIENT)
Dept: CARDIOLOGY | Facility: CLINIC | Age: 54
End: 2024-08-29
Payer: COMMERCIAL

## 2024-09-06 ENCOUNTER — APPOINTMENT (OUTPATIENT)
Dept: CARDIOLOGY | Facility: CLINIC | Age: 54
End: 2024-09-06
Payer: COMMERCIAL

## 2024-09-06 VITALS
WEIGHT: 314.8 LBS | SYSTOLIC BLOOD PRESSURE: 122 MMHG | DIASTOLIC BLOOD PRESSURE: 74 MMHG | HEART RATE: 58 BPM | HEIGHT: 74 IN | OXYGEN SATURATION: 97 % | BODY MASS INDEX: 40.4 KG/M2

## 2024-09-06 DIAGNOSIS — R42 DIZZINESS: ICD-10-CM

## 2024-09-06 DIAGNOSIS — I70.90 ATHEROSCLEROSIS: Primary | ICD-10-CM

## 2024-09-06 DIAGNOSIS — I15.2 HYPERTENSION ASSOCIATED WITH DIABETES (MULTI): ICD-10-CM

## 2024-09-06 DIAGNOSIS — F17.200 SMOKING: ICD-10-CM

## 2024-09-06 DIAGNOSIS — E11.59 HYPERTENSION ASSOCIATED WITH DIABETES (MULTI): ICD-10-CM

## 2024-09-06 PROCEDURE — 3008F BODY MASS INDEX DOCD: CPT | Performed by: STUDENT IN AN ORGANIZED HEALTH CARE EDUCATION/TRAINING PROGRAM

## 2024-09-06 PROCEDURE — 3074F SYST BP LT 130 MM HG: CPT | Performed by: STUDENT IN AN ORGANIZED HEALTH CARE EDUCATION/TRAINING PROGRAM

## 2024-09-06 PROCEDURE — 4010F ACE/ARB THERAPY RXD/TAKEN: CPT | Performed by: STUDENT IN AN ORGANIZED HEALTH CARE EDUCATION/TRAINING PROGRAM

## 2024-09-06 PROCEDURE — 99406 BEHAV CHNG SMOKING 3-10 MIN: CPT | Performed by: STUDENT IN AN ORGANIZED HEALTH CARE EDUCATION/TRAINING PROGRAM

## 2024-09-06 PROCEDURE — 3048F LDL-C <100 MG/DL: CPT | Performed by: STUDENT IN AN ORGANIZED HEALTH CARE EDUCATION/TRAINING PROGRAM

## 2024-09-06 PROCEDURE — 4004F PT TOBACCO SCREEN RCVD TLK: CPT | Performed by: STUDENT IN AN ORGANIZED HEALTH CARE EDUCATION/TRAINING PROGRAM

## 2024-09-06 PROCEDURE — 3044F HG A1C LEVEL LT 7.0%: CPT | Performed by: STUDENT IN AN ORGANIZED HEALTH CARE EDUCATION/TRAINING PROGRAM

## 2024-09-06 PROCEDURE — 3078F DIAST BP <80 MM HG: CPT | Performed by: STUDENT IN AN ORGANIZED HEALTH CARE EDUCATION/TRAINING PROGRAM

## 2024-09-06 PROCEDURE — 99215 OFFICE O/P EST HI 40 MIN: CPT | Performed by: STUDENT IN AN ORGANIZED HEALTH CARE EDUCATION/TRAINING PROGRAM

## 2024-09-06 NOTE — PROGRESS NOTES
No chief complaint on file.    HPI:  I  was requested by Dr. Bryant to evaluate this patient in consultation for cardiac assessment.     Patient 54-year-old current smoker male with prior medical history significant for hypertension, hyperlipidemia, morbid obesity, smoking and abnormal stress test in 9/2020 followed by normal left heart catheterization. Currently the patient was complaining of some chest discomfort and shortness of breath that have been happening for the past 3 months. He notes that the chest discomfort is somewhat random and last for hours at a time. The shortness of breath is principally with exertion and can get worse. He also notes some orthopnea. Denies any PND or lower extremity edema. He underwent Left Heart Catheterization on April/2023 through R radial artery that showed non-obstructive coronary artery disease.   EKG showing normal sinus rhythm and no abnormalities.  Echo showing normal LVEF 65% with no wall motion abnormalities. Mild asymmetric left ventricular hypertrophy  US doppler carotids showing Heterogenous non-obstructive plaque R carotid artery  LHC (04/2023) showing normal coronaries     Patient returned for last appointment feeling good. He had lost 20 lb after the LHC. Total weight lost of ~100 lb (430 --> 329). He has improved his diet and is taking Mounjaro. He is currently asymptomatic.      He presented to ED [facility] on 05/21/2024 complaining of palpitations associated with dizziness and felt like he was going to faint. He denied chest pain, shortness of breath, leg edema, fever, chills, orthopnea, paroxysmal nocturnal dyspnea or syncope.     7-day holter monitor:  *The predominant rhythm was sinus.  *The Maximum Heart Rate recorded was 137 bpm, 05/25 12:41:40, the Minimum Heart Rate recorded was 48 bpm, 05/28 02:09:15, and the Average Heart Rate was 75 bpm.  *There were 11 VE beats with a burden of <1 %.  *There were 229 SVE beats with a burden of <1 %. There were 6  occurrences of Supraventricular Tachycardia with the Fastest episode 129 bpm, 05/23 01:28:58, and the Longest episode 4 beats, 05/23 01:28:51.  *There were 0 Patient Triggers.  *No signs of atrial fibrillation, atrial flutter, sinus pauses or malignant arrhythmias    Left Heart Cath (04/2023) showed normal coronaries    Patient returns today stating that is asymptomatic from the cardiovascular standpoint and feeling fine. Has been compliant to the medication. Notably, he lost a total of 120 pounds over the last year, coming down from 450 pounds to 329 pounds.  He notes feeling much more energetic and even had to reduce his blood pressure medications now that he lost weight.    Past Medical History  Past Medical History:   Diagnosis Date    Encounter for screening for malignant neoplasm of prostate 12/04/2020    Screening PSA (prostate specific antigen)    Immunization not carried out because of patient refusal     Influenza vaccination declined       Past Surgical History  Past Surgical History:   Procedure Laterality Date    APPENDECTOMY      CARDIAC CATHETERIZATION  09/23/2020    CARDIAC CATHETERIZATION  04/2023    CHOLECYSTECTOMY      COLONOSCOPY  07/2014    DR. GONZALEZ - REPEAT 3 YEARS. H/O POLYP, PRE-CANCER CELLS    TONSILLECTOMY         Past Family History  Family History   Problem Relation Name Age of Onset    Breast cancer Mother      No Known Problems Father      Coronary artery disease Other GRANDPARENT     Hyperlipidemia Other GRANDPARENT     Hypertension Other GRANDPARENT     Heart attack Other GRANDPARENT        Allergy History  Allergies   Allergen Reactions    Amlodipine Swelling    Metformin Diarrhea       Past Social History  Social History     Socioeconomic History    Marital status:    Tobacco Use    Smoking status: Every Day     Current packs/day: 0.50     Types: Cigarettes    Smokeless tobacco: Never   Vaping Use    Vaping status: Never Used   Substance and Sexual Activity    Alcohol  use: Not Currently    Drug use: Never       Social History     Tobacco Use   Smoking Status Every Day    Current packs/day: 0.50    Types: Cigarettes   Smokeless Tobacco Never       Review of Systems:  Constitutional:  Negative for chills and fever.   HENT: Negative.     Eyes: Negative.  Negative for photophobia.   Respiratory:  Negative for cough and shortness of breath.    Cardiovascular:  Positive for palpitations. Negative for chest pain and leg swelling.   Gastrointestinal:  Negative for nausea and vomiting.   Genitourinary:  Negative for dysuria and flank pain.   Musculoskeletal:  Negative for arthralgias and myalgias.   Skin:  Negative for rash.   Neurological:  Positive for dizziness and light-headedness. Negative for tremors, weakness and headaches.   Hematological: Negative.    Psychiatric/Behavioral: Negative.     All other systems reviewed and are negative.    Objective Data:  Last Recorded Vitals:  There were no vitals filed for this visit.    Last Labs:  CBC - 8/12/2024:  7:02 AM  8.4 14.8 382    46.8      CMP - 8/12/2024:  7:02 AM  9.3 6.4 11 --- 0.3   _ 4.1 13 54      PTT - 5/21/2024:  2:35 PM  1.0   11.7 30     TROPHS   Date/Time Value Ref Range Status   05/21/2024 02:35 PM 4 0 - 20 ng/L Final   04/12/2023 01:14 PM 8 0 - 20 ng/L Final     Comment:     .  Less than 99th percentile of normal range cutoff-  Female and children under 18 years old <14 ng/L; Male <21 ng/L: Negative  Repeat testing should be performed if clinically indicated.   .  Female and children under 18 years old 14-50 ng/L; Male 21-50 ng/L:  Consistent with possible cardiac damage and possible increased clinical   risk. Serial measurements may help to assess extent of myocardial damage.   .  >50 ng/L: Consistent with cardiac damage, increased clinical risk and  myocardial infarction. Serial measurements may help assess extent of   myocardial damage.   .   NOTE: Children less than 1 year old may have higher baseline troponin   levels  and results should be interpreted in conjunction with the overall   clinical context.   .  NOTE: Troponin I testing is performed using a different   testing methodology at [facility] than at other   [facility]. Direct result comparisons should only   be made within the same method.     04/12/2023 11:03 AM 8 0 - 20 ng/L Final     Comment:     .  Less than 99th percentile of normal range cutoff-  Female and children under 18 years old <14 ng/L; Male <21 ng/L: Negative  Repeat testing should be performed if clinically indicated.   .  Female and children under 18 years old 14-50 ng/L; Male 21-50 ng/L:  Consistent with possible cardiac damage and possible increased clinical   risk. Serial measurements may help to assess extent of myocardial damage.   .  >50 ng/L: Consistent with cardiac damage, increased clinical risk and  myocardial infarction. Serial measurements may help assess extent of   myocardial damage.   .   NOTE: Children less than 1 year old may have higher baseline troponin   levels and results should be interpreted in conjunction with the overall   clinical context.   .  NOTE: Troponin I testing is performed using a different   testing methodology at [facility] than at other   [facility] hospitals. Direct result comparisons should only   be made within the same method.     04/12/2023 09:02 AM 9 0 - 20 ng/L Final     Comment:     .  Less than 99th percentile of normal range cutoff-  Female and children under 18 years old <14 ng/L; Male <21 ng/L: Negative  Repeat testing should be performed if clinically indicated.   .  Female and children under 18 years old 14-50 ng/L; Male 21-50 ng/L:  Consistent with possible cardiac damage and possible increased clinical   risk. Serial measurements may help to assess extent of myocardial damage.   .  >50 ng/L: Consistent with cardiac damage, increased clinical risk and  myocardial infarction. Serial measurements may help assess extent of    myocardial damage.   .   NOTE: Children less than 1 year old may have higher baseline troponin   levels and results should be interpreted in conjunction with the overall   clinical context.   .  NOTE: Troponin I testing is performed using a different   testing methodology at [facility] than at other   [facility] hospitals. Direct result comparisons should only   be made within the same method.       BNP   Date/Time Value Ref Range Status   05/21/2024 02:35 PM 64 0 - 99 pg/mL Final     HGBA1C   Date/Time Value Ref Range Status   08/12/2024 07:02 AM 5.2 see below % Final   02/06/2024 06:53 AM 5.6 see below % Final     LDLCALC   Date/Time Value Ref Range Status   02/06/2024 06:53 AM 66 <=99 mg/dL Final     Comment:                                 Near   Borderline      AGE      Desirable  Optimal    High     High     Very High     0-19 Y     0 - 109     ---    110-129   >/= 130     ----    20-24 Y     0 - 119     ---    120-159   >/= 160     ----      >24 Y     0 -  99   100-129  130-159   160-189     >/=190       VLDL   Date/Time Value Ref Range Status   02/06/2024 06:53 AM 24 0 - 40 mg/dL Final   01/07/2023 08:59 AM 17 0 - 40 mg/dL Final   10/16/2021 08:47 AM 34 0 - 40 mg/dL Final   07/27/2020 03:12 PM 40 0 - 40 mg/dL Final        Patient Medications:  Outpatient Encounter Medications as of 9/6/2024   Medication Sig Dispense Refill    aspirin 81 mg EC tablet Take 1 tablet (81 mg) by mouth once daily. 90 tablet 3    atorvastatin (Lipitor) 40 mg tablet Take 1 tablet (40 mg) by mouth once daily at bedtime. 90 tablet 3    clotrimazole (Lotrimin) 1 % cream Apply 1 Application topically 2 times a day. 45 g 0    fluticasone-umeclidin-vilanter (TRELEGY-ELLIPTA) 100-62.5-25 mcg blister with device Inhale 1 puff once daily. 60 each 3    imiquimod (Aldara) 5 % cream Apply 1 packet topically once daily.      ipratropium-albuteroL (Duo-Neb) 0.5-2.5 mg/3 mL nebulizer solution Take 3 mL by nebulization 4 times a day.       lisinopril 2.5 mg tablet Take 1 tablet (2.5 mg) by mouth once daily. 90 tablet 3    metoprolol succinate XL (Toprol XL) 100 mg 24 hr tablet Take 1 tablet (100 mg) by mouth once daily. Do not crush or chew. 90 tablet 3    naproxen (Naprosyn) 250 mg tablet Take 1 tablet (250 mg) by mouth once daily as needed for mild pain (1 - 3).      NIFEdipine ER (Adalat CC) 30 mg 24 hr tablet Take 1 tablet (30 mg) by mouth once daily. 90 tablet 3    omeprazole (PriLOSEC) 40 mg DR capsule Take 1 capsule (40 mg) by mouth once daily. 90 capsule 3    tamsulosin (Flomax) 0.4 mg 24 hr capsule Take 1 capsule (0.4 mg) by mouth once daily at bedtime. 90 capsule 3    tirzepatide (Mounjaro) 2.5 mg/0.5 mL pen injector Inject 2.5 mg under the skin 1 (one) time per week. 2 mL 11     No facility-administered encounter medications on file as of 9/6/2024.       General: alert, oriented and in no acute distress. Obesity  HEENT: NC/AT; EOMI; PERRLA, external ear is normal  Neck: supple; trachea midline; no masses; no JVD  Chest: clear breath sounds bilaterally; no wheezing  Cardio: regular rhythm, S1S2 normal, no murmurs  Abdomen: Soft, non-tender, non-distension, no organomegaly  Extremities: no clubbing/cyanosis/edema  Neuro: Grossly intact     Psychiatric: Normal mood and affect     Past Cardiology Results (Last 3 Years):  EKG:  ECG 12 lead (Clinic Performed) 05/31/2024      ECG 12 lead 05/21/2024    Echo:  Echo Results:  No results found for this or any previous visit from the past 365 days.     Cath:  No results found for this or any previous visit from the past 1095 days.    I have personally reviewed the test results and my impressions are:  Left Heart Catheterization on April/2023 through R radial artery that showed non-obstructive coronary artery disease.   EKG showing normal sinus rhythm and no abnormalities.  Echo showing normal LVEF 65% with no wall motion abnormalities. Mild asymmetric left ventricular hypertrophy  Heterogenous  non-obstructive plaque R carotid artery  [facility] (04/2023) showing normal coronaries     CV NCDR CATHPCI V5 COLLECTION FORM   Stress Test:  No results found for this or any previous visit from the past 1095 days.    Cardiac Imaging:  No results found for this or any previous visit from the past 1095 days.       Assessment/Plan     In summary, Mr. Davis is a 54-year-old current everyday smoker male with prior medical history significant for hypertension, hyperlipidemia, obesity, smoking and abnormal stress test in 9/2020 followed by normal left heart catheterization. Currently the patient was complaining of some chest discomfort and shortness of breath that have been happening for the past 3 months. He underwent Left Heart Catheterization on April/2023 through R radial artery that showed non-obstructive coronary artery disease.      # Cardiology consulted to determine pre-operative risk for hip surgery     Patient currently able to perform >4 METs without significant cardiac limitations.   ECG today with no signs of ischemia/infarct.   Patient denies any history of reaction to anesthesia in the past.  Based on the Revised Cardiac Risk Index - RCRI, the 30-day risk for death, myocardial infarction or cardiac arrest is 6%. Patient can undergo the procedure with low risk of cardiovascular complications. Ideally patient should remain on ASA, but if the bleeding risk is excessive based on the evaluation of the surgical team, ASA should be discontinued 5-7 days prior to procedure and resumed 24 hours after the procedure. Would suggest to keep taking all the other medications up to the day of the surgery.      # Dizziness  - He presented to ED [facility] on 05/21/2024 complaining of palpitations associated with dizziness and felt like he was going to faint. He denied chest pain, shortness of breath, leg edema, fever, chills, orthopnea, paroxysmal nocturnal dyspnea or syncope.   - 7-day holter monitor:  *The predominant rhythm  was sinus.  *The Maximum Heart Rate recorded was 137 bpm, 05/25 12:41:40, the Minimum Heart Rate recorded was 48 bpm, 05/28 02:09:15, and the Average Heart Rate was 75 bpm.  *There were 11 VE beats with a burden of <1 %.  *There were 229 SVE beats with a burden of <1 %. There were 6 occurrences of Supraventricular Tachycardia with the Fastest episode 129 bpm, 05/23 01:28:58, and the Longest episode 4 beats, 05/23 01:28:51.  *There were 0 Patient Triggers.  *No signs of atrial fibrillation, atrial flutter, sinus pauses or malignant arrhythmias  - Patient was reassured.  Notably, he lost a total of 120 pounds over the last year, coming down from 450 pounds to 329 pounds.  He notes feeling much more energetic and even had to reduce his blood pressure medications now that he lost weight.  - Keep Lisinopril 2.5mg daily and Metoprolol succinate 100mg daily.   - Follow up yearly.     # Atherosclerosis / S/P PCI to OM1 / Heterogenous non-obstructive plaque R carotid artery  - US carotids showig <50% plaque R carotid and no plaque in L carotid.   - He underwent LHC on April/2023 through R radial artery that showed non-obstructive coronary artery disease.   - Good healing in the R radial access site. Good radial pulse.  - No need for further coronary assessment at this point.  - Keep ASA 81mg daily and Atorvastatin 40mg daily.  -We had a thorough conversation regarding good habits, good diet and avoiding junk food. Advised for exercises. The patient also states that he has lost weight from 430 to 329 pounds during this year and he will keep it up. He is taking Mounjaro.     # Hypertension  - BP controlled  - Echo showing normal LVEF 65% with mild asymmetric left ventricular hypertrophy  - Keep home medication     # T2DM  - Keep Mounjaro, diabetic diet  - Counseling     # Hyperlipidemia  - Keep Atorvastatin 40mg daily.     # Smoking  - We had a thorough conversation with the patient (~3min) addressing the hazard risks for  smoking, including but not limited to heart attack, stroke and cancer. The patient states to understand the risks and is contemplating on quitting smoking.      We have discussed the most common side effects of the prescribed medications, indications, drug interactions, risks, complications, and alternatives of medications/therapeutics were explained and discussed. The patient has been requested to monitor closely for any untoward side effects or complications of medications. The patient has been strongly advised to be compliant with the recommendations, all the questions and concerns have been addressed. The patient has been also instructed to call, to return sooner or to go to the emergency department if symptoms persist or get worsen. The patient voiced understanding and denies any further questions at this time.     This note was transcribed using the Dragon Dictation system. There may be grammatical, punctuation, or verbiage errors that occur with voice recognition programs.     Counseling greater than 50% of visit regarding all cardiac issues.     Thank you, Dr. Bryatn, for allowing me to participate in the care of this patient. Please reach me out if you have any questions or if you need any clarifications regarding the patient's care.     Colby Higginbotham MD  Cardiology

## 2024-09-24 ENCOUNTER — TELEMEDICINE (OUTPATIENT)
Dept: PRIMARY CARE | Facility: CLINIC | Age: 54
End: 2024-09-24
Payer: COMMERCIAL

## 2024-09-24 VITALS — HEIGHT: 74 IN | BODY MASS INDEX: 40.3 KG/M2 | WEIGHT: 314 LBS

## 2024-09-24 DIAGNOSIS — U07.1 COVID-19: Primary | ICD-10-CM

## 2024-09-24 DIAGNOSIS — E66.01 CLASS 3 SEVERE OBESITY DUE TO EXCESS CALORIES WITH SERIOUS COMORBIDITY AND BODY MASS INDEX (BMI) OF 40.0 TO 44.9 IN ADULT: ICD-10-CM

## 2024-09-24 PROCEDURE — 3044F HG A1C LEVEL LT 7.0%: CPT | Performed by: NURSE PRACTITIONER

## 2024-09-24 PROCEDURE — 3008F BODY MASS INDEX DOCD: CPT | Performed by: NURSE PRACTITIONER

## 2024-09-24 PROCEDURE — 3048F LDL-C <100 MG/DL: CPT | Performed by: NURSE PRACTITIONER

## 2024-09-24 PROCEDURE — 99214 OFFICE O/P EST MOD 30 MIN: CPT | Performed by: NURSE PRACTITIONER

## 2024-09-24 PROCEDURE — 4004F PT TOBACCO SCREEN RCVD TLK: CPT | Performed by: NURSE PRACTITIONER

## 2024-09-24 PROCEDURE — 4010F ACE/ARB THERAPY RXD/TAKEN: CPT | Performed by: NURSE PRACTITIONER

## 2024-09-24 RX ORDER — NIRMATRELVIR AND RITONAVIR 300-100 MG
3 KIT ORAL 2 TIMES DAILY
Qty: 30 TABLET | Refills: 0 | Status: SHIPPED | OUTPATIENT
Start: 2024-09-24

## 2024-09-24 RX ORDER — DEXAMETHASONE 4 MG/1
4 TABLET ORAL
Qty: 5 TABLET | Refills: 0 | Status: SHIPPED | OUTPATIENT
Start: 2024-09-24 | End: 2024-09-29

## 2024-09-24 ASSESSMENT — ENCOUNTER SYMPTOMS
CHEST TIGHTNESS: 0
CHOKING: 0
HEADACHES: 0
DIARRHEA: 0
SORE THROAT: 0
EYE REDNESS: 0
WHEEZING: 0
JOINT SWELLING: 0
FACIAL ASYMMETRY: 0
NECK PAIN: 0
EYE PAIN: 0
DIZZINESS: 0
ABDOMINAL DISTENTION: 0
FEVER: 0
UNEXPECTED WEIGHT CHANGE: 0
BLOOD IN STOOL: 0
SLEEP DISTURBANCE: 0
APNEA: 0
FLANK PAIN: 0
SHORTNESS OF BREATH: 0
PALPITATIONS: 0
NUMBNESS: 0
WOUND: 0
ABDOMINAL PAIN: 0
FATIGUE: 1
BACK PAIN: 0
VOMITING: 0
COUGH: 1
CHILLS: 0
PHOTOPHOBIA: 0
CONSTIPATION: 0
DYSURIA: 0
ARTHRALGIAS: 1
WEAKNESS: 0
HEMATURIA: 0
CONFUSION: 0
SEIZURES: 0
DIFFICULTY URINATING: 0
NERVOUS/ANXIOUS: 0
FREQUENCY: 0
TROUBLE SWALLOWING: 0
SPEECH DIFFICULTY: 0
NAUSEA: 1
MYALGIAS: 0

## 2024-09-24 NOTE — LETTER
September 24, 2024     Patient: Boy Davis Jr.   YOB: 1970   Date of Visit: 9/24/2024       To Whom It May Concern:    Boy Davis was seen in my clinic on 9/24/2024. Please excuse Boy for his absence from work on 9/23/24-9/27/24. He may return on 9/30/24.  If you have any questions or concerns, please don't hesitate to call.         Sincerely,         LUCY Galvez-CNP

## 2024-09-24 NOTE — PROGRESS NOTES
"Subjective   Patient ID: Boy Davis Jr. is a 54 y.o. male who presents for Follow-up (VIRTUAL VISIT - COVID POSITIVE YESTERDAY. SYMPTOMS BEGAN SUNDAY - COUGH, NASAL CONGESTION, BODY ACHES, CHILLS, QUILES. REQUESTING PAXLOVID. ).    VIRTUAL APPOINTMENT BEING PERFORMED      HPI:  Presents today for C/O COUGH, NASAL CONGESTION , AND HA X 2 DAYS  modifying factors consists of POSITIVE COVID TEST ON 9/23/24 associated symptoms consist of BODY ACHES. NO FEVER, SOB, OR CP prior treatment consists of medication NONE    Visit Vitals  Ht 1.88 m (6' 2\")   Wt 142 kg (314 lb)   BMI 40.32 kg/m²   Smoking Status Every Day   BSA 2.72 m²        Review of Systems   Constitutional:  Positive for fatigue. Negative for chills, fever and unexpected weight change.   HENT:  Positive for congestion. Negative for ear pain, sore throat and trouble swallowing.    Eyes:  Negative for photophobia, pain, redness and visual disturbance.   Respiratory:  Positive for cough. Negative for apnea, choking, chest tightness, shortness of breath and wheezing.    Cardiovascular:  Negative for chest pain, palpitations and leg swelling.   Gastrointestinal:  Positive for nausea. Negative for abdominal distention, abdominal pain, blood in stool, constipation, diarrhea and vomiting.   Genitourinary:  Negative for difficulty urinating, dysuria, flank pain, frequency, hematuria and urgency.   Musculoskeletal:  Positive for arthralgias. Negative for back pain, gait problem, joint swelling, myalgias and neck pain.   Skin:  Negative for rash and wound.   Neurological:  Negative for dizziness, seizures, syncope, facial asymmetry, speech difficulty, weakness, numbness and headaches.   Psychiatric/Behavioral:  Negative for confusion, sleep disturbance and suicidal ideas. The patient is not nervous/anxious.        Objective     Physical Exam  Neurological:      Mental Status: He is alert and oriented to person, place, and time.   Psychiatric:         Mood and Affect: " Mood normal.         Behavior: Behavior normal.         Thought Content: Thought content normal.         Judgment: Judgment normal.            Assessment/Plan   Problem List Items Addressed This Visit       Class 3 severe obesity due to excess calories with serious comorbidity and body mass index (BMI) of 40.0 to 44.9 in adult (Multi)     Other Visit Diagnoses       COVID-19    -  Primary    Relevant Medications    nirmatrelvir-ritonavir (Paxlovid) 300 mg (150 mg x 2)-100 mg tablet therapy pack    dexAMETHasone (Decadron) 4 mg tablet          INSTRUCTED TO SELF QUARANTINE AND TO PRACTICE GOOD HAND WASHING TECHNIQUES. PT WAS INSTRUCTED TO INCREASE FLUID INTAKE AND TAKE TYLENOL 650 MG PO Q6H/PRN FOR PAIN OR FEVER. RETURN SOONER OR GO TO THE ER IF SYMPTOMS PERSIST OR WORSEN    HOLD FLOMAX AND STATIN WHILE TAKING PAXLOVID. WE DISCUSSED MOST COMMON SIDE EFFECTS OF PRESCRIBED MEDICATIONS. INDICATIONS, RISK, COMPLICATIONS, AND ALTERNATIVES OF MEDICATION/THERAPEUTICS WERE EXPLAINED AND DISCUSSED. PLEASE MONITOR CLOSELY FOR ANY UNTOWARD SIDE EFFECTS OR COMPLICATIONS OF MEDICATIONS. PATIENT IS STRONGLY ADVISED TO BE COMPLIANT WITH RECOMMENDATIONS. QUESTIONS AND CONCERNS WERE ADDRESSED. INSTRUCTED TO CALL, RETURN SOONER, OR GO TO THE ER,  IF SYMPTOMS PERSIST OR WORSEN. THEY VOICED UNDERSTANDING AND  DENIES FURTHER QUESTIONS AT THIS TIME.    TIME CODE  1. PREPARATION FOR PATIENT'S VISIT (REVIEWING CHART, CURRENT MEDICAL RECORDS, OUTSIDE HEALTH PROVIDER RECORDS, PREVIOUS HISTORY, EXAM, TEST, PROCEDURE, AND MEDICATIONS)  2. FACE TO FACE ENCOUNTER OBTAINING HISTORY FROM THE PATIENT/FAMILY/CAREGIVERS; PERFORMING EVALUATION AND EXAMINATION; ORDERING TESTS OR PROCEDURES; REFERRING AND COMMUNICATING WITH OTHER HEALTHCARE PROVIDERS; COUNSELING AND EDUCATION OF THE PATIENT/FAMILY/CAREGIVERS; INDEPENDENTLY INTERPRETING RESULTS (TESTS, LABS, PROCEDURES, IMAGING) AND COMMUNICATING AND EXPLAINING RESULTS TO THE PATIENT/FAMILY/CAREGIVERS  3.  COORDINATION OF CARE; PREPARING AND PRINTING DISCHARGE INSTRUCTIONS AND ANY EDUCATIONAL MATERIAL FOR THE PATIENT/FAMILY/CAREGIVERS. DOCUMENTING CLINICAL INFORMATION IN THE ELECTRONIC MEDICAL RECORD   4. REVIEWING OARRS AS NEEDED    MDM  1) COMPLEXITY: MORE THAN 1 STABLE CHRONIC CONDITION ADDRESSED OR 1 ACUTE ILLNESS ADDRESSED   2)DATA: TESTS INTERPRETED AND OR ORDERED, TOOK INDEPENDENT HISTORY OR RECORDS REVIEWED  3)RISK: MODERATE RISK DUE TO NATURE OF MEDICAL CONDITIONS/COMORBIDITY OR MEDICATIONS ORDERED OR SURGICAL OR PROCEDURE REFERRAL    3 WEEKS

## 2024-10-16 ENCOUNTER — APPOINTMENT (OUTPATIENT)
Dept: PRIMARY CARE | Facility: CLINIC | Age: 54
End: 2024-10-16
Payer: COMMERCIAL

## 2024-11-07 ENCOUNTER — TELEMEDICINE (OUTPATIENT)
Dept: PRIMARY CARE | Facility: CLINIC | Age: 54
End: 2024-11-07
Payer: COMMERCIAL

## 2024-11-07 VITALS — BODY MASS INDEX: 40.3 KG/M2 | WEIGHT: 314 LBS | HEIGHT: 74 IN

## 2024-11-07 DIAGNOSIS — Z12.11 ENCOUNTER FOR SCREENING FOR MALIGNANT NEOPLASM OF COLON: ICD-10-CM

## 2024-11-07 DIAGNOSIS — E66.813 CLASS 3 SEVERE OBESITY DUE TO EXCESS CALORIES WITH SERIOUS COMORBIDITY AND BODY MASS INDEX (BMI) OF 40.0 TO 44.9 IN ADULT: ICD-10-CM

## 2024-11-07 DIAGNOSIS — K59.00 CONSTIPATION, UNSPECIFIED CONSTIPATION TYPE: Primary | ICD-10-CM

## 2024-11-07 DIAGNOSIS — E66.01 CLASS 3 SEVERE OBESITY DUE TO EXCESS CALORIES WITH SERIOUS COMORBIDITY AND BODY MASS INDEX (BMI) OF 40.0 TO 44.9 IN ADULT: ICD-10-CM

## 2024-11-07 DIAGNOSIS — J44.9 CHRONIC OBSTRUCTIVE PULMONARY DISEASE, UNSPECIFIED COPD TYPE (MULTI): ICD-10-CM

## 2024-11-07 PROCEDURE — 3008F BODY MASS INDEX DOCD: CPT | Performed by: NURSE PRACTITIONER

## 2024-11-07 PROCEDURE — 4004F PT TOBACCO SCREEN RCVD TLK: CPT | Performed by: NURSE PRACTITIONER

## 2024-11-07 PROCEDURE — 99214 OFFICE O/P EST MOD 30 MIN: CPT | Performed by: NURSE PRACTITIONER

## 2024-11-07 PROCEDURE — 3048F LDL-C <100 MG/DL: CPT | Performed by: NURSE PRACTITIONER

## 2024-11-07 PROCEDURE — 3044F HG A1C LEVEL LT 7.0%: CPT | Performed by: NURSE PRACTITIONER

## 2024-11-07 PROCEDURE — 4010F ACE/ARB THERAPY RXD/TAKEN: CPT | Performed by: NURSE PRACTITIONER

## 2024-11-07 RX ORDER — POLYETHYLENE GLYCOL 3350 17 G/17G
POWDER, FOR SOLUTION ORAL
Qty: 30 EACH | Refills: 0 | Status: SHIPPED | OUTPATIENT
Start: 2024-11-07

## 2024-11-07 RX ORDER — BISACODYL 5 MG
TABLET, DELAYED RELEASE (ENTERIC COATED) ORAL
Qty: 30 TABLET | Refills: 0 | Status: SHIPPED | OUTPATIENT
Start: 2024-11-07

## 2024-11-07 RX ORDER — DOCUSATE SODIUM 100 MG/1
100 CAPSULE, LIQUID FILLED ORAL 3 TIMES DAILY PRN
Qty: 90 CAPSULE | Refills: 0 | Status: SHIPPED | OUTPATIENT
Start: 2024-11-07

## 2024-11-07 ASSESSMENT — ENCOUNTER SYMPTOMS
ABDOMINAL DISTENTION: 0
DYSURIA: 0
HEMATURIA: 0
PHOTOPHOBIA: 0
CONFUSION: 0
CONSTIPATION: 1
WOUND: 0
APNEA: 0
CHEST TIGHTNESS: 0
ABDOMINAL PAIN: 1
EYE PAIN: 0
SHORTNESS OF BREATH: 0
MYALGIAS: 0
SEIZURES: 0
SPEECH DIFFICULTY: 0
HEADACHES: 0
NECK PAIN: 0
PALPITATIONS: 0
DIZZINESS: 0
NUMBNESS: 0
JOINT SWELLING: 0
WHEEZING: 0
TROUBLE SWALLOWING: 0
UNEXPECTED WEIGHT CHANGE: 0
FEVER: 0
FREQUENCY: 0
FATIGUE: 0
SLEEP DISTURBANCE: 0
FACIAL ASYMMETRY: 0
CHILLS: 0
DIFFICULTY URINATING: 0
EYE REDNESS: 0
NERVOUS/ANXIOUS: 0
DIARRHEA: 0
FLANK PAIN: 0
CHOKING: 0
BLOOD IN STOOL: 0
BACK PAIN: 0
VOMITING: 0
ARTHRALGIAS: 0
COUGH: 0
WEAKNESS: 0
NAUSEA: 0
SORE THROAT: 0

## 2024-11-07 NOTE — PROGRESS NOTES
"Subjective   Patient ID: Boy Davis Jr. is a 54 y.o. male who presents for Follow-up (C/O CONSTIPATION WITH CRAMPS ABD AND BACK PAIN. HAS NOT HAD A BM IN 1 WK).    Virtual or Telephone Consent    An interactive audio and video telecommunication system which permits real time communications between the patient (at the originating site) and provider (at the distant site) was utilized to provide this telehealth service.   Verbal consent was requested and obtained from Boy Davis Jr. on this date, 11/07/24 for a telehealth visit.     HPI:  Presents today for C/O CONSTIPATION  X 6 DAYS modifying factors consists of LAST COLON AROUND 10 YEARS AGO.  associated symptoms consist of ABD PAIN/PRESSURE. CRAMPING. NO N/V. NO FEVER.  prior treatment consists of medication APPLE JUICE AND FIBER     Visit Vitals  Ht 1.88 m (6' 2\")   Wt 142 kg (314 lb)   BMI 40.32 kg/m²   Smoking Status Every Day   BSA 2.72 m²        Review of Systems   Constitutional:  Negative for chills, fatigue, fever and unexpected weight change.   HENT:  Negative for congestion, ear pain, sore throat and trouble swallowing.    Eyes:  Negative for photophobia, pain, redness and visual disturbance.   Respiratory:  Negative for apnea, cough, choking, chest tightness, shortness of breath and wheezing.    Cardiovascular:  Negative for chest pain, palpitations and leg swelling.   Gastrointestinal:  Positive for abdominal pain and constipation. Negative for abdominal distention, blood in stool, diarrhea, nausea and vomiting.   Genitourinary:  Negative for difficulty urinating, dysuria, flank pain, frequency, hematuria and urgency.   Musculoskeletal:  Negative for arthralgias, back pain, gait problem, joint swelling, myalgias and neck pain.   Skin:  Negative for rash and wound.   Neurological:  Negative for dizziness, seizures, syncope, facial asymmetry, speech difficulty, weakness, numbness and headaches.   Psychiatric/Behavioral:  Negative for confusion, " sleep disturbance and suicidal ideas. The patient is not nervous/anxious.        Objective     Physical Exam  Neurological:      Mental Status: He is alert and oriented to person, place, and time.   Psychiatric:         Mood and Affect: Mood normal.         Behavior: Behavior normal.         Thought Content: Thought content normal.         Judgment: Judgment normal.            Assessment/Plan   Problem List Items Addressed This Visit       Class 3 severe obesity due to excess calories with serious comorbidity and body mass index (BMI) of 40.0 to 44.9 in adult    Constipation - Primary    Relevant Medications    polyethylene glycol (Glycolax, Miralax) 17 gram packet    bisacodyl (Dulcolax, bisacodyl,) 5 mg EC tablet    docusate sodium (Colace) 100 mg capsule     Other Visit Diagnoses       Encounter for screening for malignant neoplasm of colon        Relevant Orders    Colonoscopy Screening; Average Risk Patient        USE COCKTAIL as DISCUSSED. IF NO RELIEF IN SYMPTOMS, CALL OFFICE OR GO TO THE ER.   WE DISCUSSED MOST COMMON SIDE EFFECTS OF PRESCRIBED MEDICATIONS. INDICATIONS, RISK, COMPLICATIONS, AND ALTERNATIVES OF MEDICATION/THERAPEUTICS WERE EXPLAINED AND DISCUSSED. PLEASE MONITOR CLOSELY FOR ANY UNTOWARD SIDE EFFECTS OR COMPLICATIONS OF MEDICATIONS. PATIENT IS STRONGLY ADVISED TO BE COMPLIANT WITH RECOMMENDATIONS. QUESTIONS AND CONCERNS WERE ADDRESSED. INSTRUCTED TO CALL, RETURN SOONER, OR GO TO THE ER,  IF SYMPTOMS PERSIST OR WORSEN. THEY VOICED UNDERSTANDING AND  DENIES FURTHER QUESTIONS AT THIS TIME.  TIME CODE  1. PREPARATION FOR PATIENT'S VISIT (REVIEWING CHART, CURRENT MEDICAL RECORDS, OUTSIDE HEALTH PROVIDER RECORDS, PREVIOUS HISTORY, EXAM, TEST, PROCEDURE, AND MEDICATIONS)  2. FACE TO FACE ENCOUNTER OBTAINING HISTORY FROM THE PATIENT/FAMILY/CAREGIVERS; PERFORMING EVALUATION AND EXAMINATION; ORDERING TESTS OR PROCEDURES; REFERRING AND COMMUNICATING WITH OTHER HEALTHCARE PROVIDERS; COUNSELING AND  EDUCATION OF THE PATIENT/FAMILY/CAREGIVERS; INDEPENDENTLY INTERPRETING RESULTS (TESTS, LABS, PROCEDURES, IMAGING) AND COMMUNICATING AND EXPLAINING RESULTS TO THE PATIENT/FAMILY/CAREGIVERS  3. COORDINATION OF CARE; PREPARING AND PRINTING DISCHARGE INSTRUCTIONS AND ANY EDUCATIONAL MATERIAL FOR THE PATIENT/FAMILY/CAREGIVERS. DOCUMENTING CLINICAL INFORMATION IN THE ELECTRONIC MEDICAL RECORD   4. REVIEWING OARRS AS NEEDED  MDM  1) COMPLEXITY: MORE THAN 1 STABLE CHRONIC CONDITION ADDRESSED OR 1 ACUTE ILLNESS ADDRESSED   2)DATA: TESTS INTERPRETED AND OR ORDERED, TOOK INDEPENDENT HISTORY OR RECORDS REVIEWED  3)RISK: MODERATE RISK DUE TO NATURE OF MEDICAL CONDITIONS/COMORBIDITY OR MEDICATIONS ORDERED OR SURGICAL OR PROCEDURE REFERRAL'    Follow up as before ON 11/19/24

## 2024-11-07 NOTE — LETTER
November 7, 2024     Patient: Boy Davis Jr.   YOB: 1970   Date of Visit: 11/7/2024       To Whom It May Concern:    Boy Davis was seen in my clinic on 11/7/2024. Please excuse Boy for his absence from work on 11/6/24-11/8/24. He may return on 11/9/24.  If you have any questions or concerns, please don't hesitate to call.         Sincerely,         LUCY Galvez-CNP

## 2024-11-19 ENCOUNTER — APPOINTMENT (OUTPATIENT)
Dept: PRIMARY CARE | Facility: CLINIC | Age: 54
End: 2024-11-19
Payer: COMMERCIAL

## 2024-11-19 VITALS
DIASTOLIC BLOOD PRESSURE: 86 MMHG | HEIGHT: 74 IN | BODY MASS INDEX: 40.09 KG/M2 | WEIGHT: 312.4 LBS | SYSTOLIC BLOOD PRESSURE: 132 MMHG | HEART RATE: 63 BPM

## 2024-11-19 DIAGNOSIS — E66.01 CLASS 3 SEVERE OBESITY DUE TO EXCESS CALORIES WITH SERIOUS COMORBIDITY AND BODY MASS INDEX (BMI) OF 40.0 TO 44.9 IN ADULT: ICD-10-CM

## 2024-11-19 DIAGNOSIS — R09.82 POST-NASAL DRIP: ICD-10-CM

## 2024-11-19 DIAGNOSIS — E66.813 CLASS 3 SEVERE OBESITY DUE TO EXCESS CALORIES WITH SERIOUS COMORBIDITY AND BODY MASS INDEX (BMI) OF 40.0 TO 44.9 IN ADULT: ICD-10-CM

## 2024-11-19 DIAGNOSIS — H61.21 RIGHT EAR IMPACTED CERUMEN: ICD-10-CM

## 2024-11-19 DIAGNOSIS — H72.91 RUPTURED EAR DRUM, RIGHT: Primary | ICD-10-CM

## 2024-11-19 PROCEDURE — 4010F ACE/ARB THERAPY RXD/TAKEN: CPT | Performed by: NURSE PRACTITIONER

## 2024-11-19 PROCEDURE — 69209 REMOVE IMPACTED EAR WAX UNI: CPT | Performed by: NURSE PRACTITIONER

## 2024-11-19 PROCEDURE — 3079F DIAST BP 80-89 MM HG: CPT | Performed by: NURSE PRACTITIONER

## 2024-11-19 PROCEDURE — 99214 OFFICE O/P EST MOD 30 MIN: CPT | Performed by: NURSE PRACTITIONER

## 2024-11-19 PROCEDURE — 4004F PT TOBACCO SCREEN RCVD TLK: CPT | Performed by: NURSE PRACTITIONER

## 2024-11-19 PROCEDURE — 3008F BODY MASS INDEX DOCD: CPT | Performed by: NURSE PRACTITIONER

## 2024-11-19 PROCEDURE — 3075F SYST BP GE 130 - 139MM HG: CPT | Performed by: NURSE PRACTITIONER

## 2024-11-19 PROCEDURE — 3044F HG A1C LEVEL LT 7.0%: CPT | Performed by: NURSE PRACTITIONER

## 2024-11-19 PROCEDURE — 3048F LDL-C <100 MG/DL: CPT | Performed by: NURSE PRACTITIONER

## 2024-11-19 RX ORDER — OFLOXACIN 3 MG/ML
5 SOLUTION AURICULAR (OTIC) 2 TIMES DAILY
Qty: 10 ML | Refills: 1 | Status: SHIPPED | OUTPATIENT
Start: 2024-11-19 | End: 2024-11-26

## 2024-11-19 RX ORDER — LORATADINE 10 MG/1
10 TABLET ORAL DAILY
Qty: 90 TABLET | Refills: 3 | Status: SHIPPED | OUTPATIENT
Start: 2024-11-19 | End: 2025-11-19

## 2024-11-19 ASSESSMENT — ENCOUNTER SYMPTOMS
SHORTNESS OF BREATH: 0
DIZZINESS: 1
CONSTIPATION: 0
NAUSEA: 0
PHOTOPHOBIA: 0
ABDOMINAL DISTENTION: 0
FACIAL ASYMMETRY: 0
MYALGIAS: 0
WHEEZING: 0
TROUBLE SWALLOWING: 0
CHOKING: 0
EYE PAIN: 0
APNEA: 0
FEVER: 0
SEIZURES: 0
SORE THROAT: 0
FREQUENCY: 0
PALPITATIONS: 0
WOUND: 0
NERVOUS/ANXIOUS: 0
WEAKNESS: 0
FATIGUE: 0
HEMATURIA: 0
CONFUSION: 0
BACK PAIN: 0
VOMITING: 0
FLANK PAIN: 0
SLEEP DISTURBANCE: 0
ABDOMINAL PAIN: 0
DIARRHEA: 0
COUGH: 0
DYSURIA: 0
JOINT SWELLING: 0
NECK PAIN: 0
ARTHRALGIAS: 0
UNEXPECTED WEIGHT CHANGE: 0
HEADACHES: 1
NUMBNESS: 0
BLOOD IN STOOL: 0
EYE REDNESS: 0
SPEECH DIFFICULTY: 0
DIFFICULTY URINATING: 0
CHILLS: 0
CHEST TIGHTNESS: 0

## 2024-11-19 NOTE — PROGRESS NOTES
"Subjective   Patient ID: Boy Davis Jr. is a 54 y.o. male who presents for Follow-up (COVID FU).    HPI:  Presents today for COVID FU. HE TESTED POSITIVE ON 9/23/24.  HIS CONSTIPATION STARTED AND HAS GOTTEN BETTER BUT REMAINS. COLON IS SCHEDULED JAN 2025. C/O HEADACHES X 2-3 MONTHS modifying factors consists of COVID MADE THEM WORSE.  HE DID SEE [facility] 9/6/24 FOR EVAL AND THEY FEEL IT IS NOT CARDIO RELATED. associated symptoms consist of DULL HEADACHES.  DIZZINESS. BAGS UNDER  B/L EYE.  prior treatment consists of medication NONE     Visit Vitals  /86   Pulse 63   Ht 1.88 m (6' 2\")   Wt 142 kg (312 lb 6.4 oz)   BMI 40.11 kg/m²   Smoking Status Every Day   BSA 2.72 m²        Review of Systems   Constitutional:  Negative for chills, fatigue, fever and unexpected weight change.   HENT:  Positive for ear pain. Negative for congestion, sore throat and trouble swallowing.    Eyes:  Negative for photophobia, pain, redness and visual disturbance.   Respiratory:  Negative for apnea, cough, choking, chest tightness, shortness of breath and wheezing.    Cardiovascular:  Negative for chest pain, palpitations and leg swelling.   Gastrointestinal:  Negative for abdominal distention, abdominal pain, blood in stool, constipation, diarrhea, nausea and vomiting.   Genitourinary:  Negative for difficulty urinating, dysuria, flank pain, frequency, hematuria and urgency.   Musculoskeletal:  Negative for arthralgias, back pain, gait problem, joint swelling, myalgias and neck pain.   Skin:  Negative for rash and wound.   Neurological:  Positive for dizziness and headaches. Negative for seizures, syncope, facial asymmetry, speech difficulty, weakness and numbness.   Psychiatric/Behavioral:  Negative for confusion, sleep disturbance and suicidal ideas. The patient is not nervous/anxious.        Objective     Physical Exam  Constitutional:       Appearance: Normal appearance. He is normal weight.   HENT:      Head: Normocephalic. "      Right Ear: There is impacted cerumen.      Ears:      Comments: LEFT TM RUPTURED   Eyes:      Extraocular Movements: Extraocular movements intact.      Conjunctiva/sclera: Conjunctivae normal.      Pupils: Pupils are equal, round, and reactive to light.   Cardiovascular:      Rate and Rhythm: Normal rate and regular rhythm.      Pulses: Normal pulses.      Heart sounds: Normal heart sounds.   Pulmonary:      Effort: Pulmonary effort is normal.      Breath sounds: Normal breath sounds.   Musculoskeletal:         General: Normal range of motion.      Cervical back: Normal range of motion.   Skin:     General: Skin is warm and dry.   Neurological:      General: No focal deficit present.      Mental Status: He is alert and oriented to person, place, and time.   Psychiatric:         Mood and Affect: Mood normal.         Behavior: Behavior normal.         Thought Content: Thought content normal.         Judgment: Judgment normal.            Assessment/Plan   Problem List Items Addressed This Visit       Class 3 severe obesity due to excess calories with serious comorbidity and body mass index (BMI) of 40.0 to 44.9 in adult    Ruptured ear drum, right - Primary    Relevant Medications    ofloxacin (Floxin) 0.3 % otic solution     Other Visit Diagnoses       Post-nasal drip        Relevant Medications    loratadine (Claritin) 10 mg tablet    Right ear impacted cerumen              INSTRUCTED TO KEEP HEAD TILTED X 5 MINUTES AFTER APPLYING EAR DROPS    RIGHT EAR FLUSHED MY MA. PATIENT TOLERATED WELL. NO COMPLICATIONS     WE DISCUSSED MOST COMMON SIDE EFFECTS OF PRESCRIBED MEDICATIONS. INDICATIONS, RISK, COMPLICATIONS, AND ALTERNATIVES OF MEDICATION/THERAPEUTICS WERE EXPLAINED AND DISCUSSED. PLEASE MONITOR CLOSELY FOR ANY UNTOWARD SIDE EFFECTS OR COMPLICATIONS OF MEDICATIONS. PATIENT IS STRONGLY ADVISED TO BE COMPLIANT WITH RECOMMENDATIONS. QUESTIONS AND CONCERNS WERE ADDRESSED. INSTRUCTED TO CALL, RETURN SOONER, OR GO TO  THE ER,  IF SYMPTOMS PERSIST OR WORSEN. THEY VOICED UNDERSTANDING AND  DENIES FURTHER QUESTIONS AT THIS TIME.    TIME CODE  1. PREPARATION FOR PATIENT'S VISIT (REVIEWING CHART, CURRENT MEDICAL RECORDS, OUTSIDE HEALTH PROVIDER RECORDS, PREVIOUS HISTORY, EXAM, TEST, PROCEDURE, AND MEDICATIONS)  2. FACE TO FACE ENCOUNTER OBTAINING HISTORY FROM THE PATIENT/FAMILY/CAREGIVERS; PERFORMING EVALUATION AND EXAMINATION; ORDERING TESTS OR PROCEDURES; REFERRING AND COMMUNICATING WITH OTHER HEALTHCARE PROVIDERS; COUNSELING AND EDUCATION OF THE PATIENT/FAMILY/CAREGIVERS; INDEPENDENTLY INTERPRETING RESULTS (TESTS, LABS, PROCEDURES, IMAGING) AND COMMUNICATING AND EXPLAINING RESULTS TO THE PATIENT/FAMILY/CAREGIVERS  3. COORDINATION OF CARE; PREPARING AND PRINTING DISCHARGE INSTRUCTIONS AND ANY EDUCATIONAL MATERIAL FOR THE PATIENT/FAMILY/CAREGIVERS. DOCUMENTING CLINICAL INFORMATION IN THE ELECTRONIC MEDICAL RECORD   4. REVIEWING OARRS AS NEEDED    MDM  1) COMPLEXITY: MORE THAN 1 STABLE CHRONIC CONDITION ADDRESSED OR 1 ACUTE ILLNESS ADDRESSED   2)DATA: TESTS INTERPRETED AND OR ORDERED, TOOK INDEPENDENT HISTORY OR RECORDS REVIEWED  3)RISK: MODERATE RISK DUE TO NATURE OF MEDICAL CONDITIONS/COMORBIDITY OR MEDICATIONS ORDERED OR SURGICAL OR PROCEDURE REFERRAL    3 WEEKS

## 2024-12-11 ENCOUNTER — APPOINTMENT (OUTPATIENT)
Dept: PRIMARY CARE | Facility: CLINIC | Age: 54
End: 2024-12-11
Payer: COMMERCIAL

## 2024-12-12 ENCOUNTER — OFFICE VISIT (OUTPATIENT)
Dept: PRIMARY CARE | Facility: CLINIC | Age: 54
End: 2024-12-12
Payer: COMMERCIAL

## 2024-12-12 VITALS
WEIGHT: 315 LBS | DIASTOLIC BLOOD PRESSURE: 88 MMHG | HEIGHT: 74 IN | BODY MASS INDEX: 40.43 KG/M2 | SYSTOLIC BLOOD PRESSURE: 138 MMHG | HEART RATE: 64 BPM

## 2024-12-12 DIAGNOSIS — H72.92 RUPTURED EAR DRUM, LEFT: ICD-10-CM

## 2024-12-12 DIAGNOSIS — E66.01 CLASS 3 SEVERE OBESITY DUE TO EXCESS CALORIES WITH SERIOUS COMORBIDITY AND BODY MASS INDEX (BMI) OF 40.0 TO 44.9 IN ADULT: ICD-10-CM

## 2024-12-12 DIAGNOSIS — E66.813 CLASS 3 SEVERE OBESITY DUE TO EXCESS CALORIES WITH SERIOUS COMORBIDITY AND BODY MASS INDEX (BMI) OF 40.0 TO 44.9 IN ADULT: ICD-10-CM

## 2024-12-12 DIAGNOSIS — S32.050S COMPRESSION FRACTURE OF L5 VERTEBRA, SEQUELA: ICD-10-CM

## 2024-12-12 DIAGNOSIS — H72.91 RUPTURED EAR DRUM, RIGHT: Primary | ICD-10-CM

## 2024-12-12 PROCEDURE — 3044F HG A1C LEVEL LT 7.0%: CPT | Performed by: NURSE PRACTITIONER

## 2024-12-12 PROCEDURE — 3079F DIAST BP 80-89 MM HG: CPT | Performed by: NURSE PRACTITIONER

## 2024-12-12 PROCEDURE — 3048F LDL-C <100 MG/DL: CPT | Performed by: NURSE PRACTITIONER

## 2024-12-12 PROCEDURE — 3008F BODY MASS INDEX DOCD: CPT | Performed by: NURSE PRACTITIONER

## 2024-12-12 PROCEDURE — 99214 OFFICE O/P EST MOD 30 MIN: CPT | Performed by: NURSE PRACTITIONER

## 2024-12-12 PROCEDURE — 4004F PT TOBACCO SCREEN RCVD TLK: CPT | Performed by: NURSE PRACTITIONER

## 2024-12-12 PROCEDURE — 4010F ACE/ARB THERAPY RXD/TAKEN: CPT | Performed by: NURSE PRACTITIONER

## 2024-12-12 PROCEDURE — 3075F SYST BP GE 130 - 139MM HG: CPT | Performed by: NURSE PRACTITIONER

## 2024-12-12 RX ORDER — PREDNISONE 10 MG/1
30 TABLET ORAL DAILY
Qty: 15 TABLET | Refills: 0 | Status: SHIPPED | OUTPATIENT
Start: 2024-12-12

## 2024-12-12 RX ORDER — OFLOXACIN 3 MG/ML
5 SOLUTION AURICULAR (OTIC) 2 TIMES DAILY
Qty: 10 ML | Refills: 0 | Status: SHIPPED | OUTPATIENT
Start: 2024-12-12 | End: 2024-12-19

## 2024-12-12 RX ORDER — HYDROCODONE BITARTRATE AND ACETAMINOPHEN 5; 325 MG/1; MG/1
1 TABLET ORAL EVERY 6 HOURS PRN
Qty: 20 TABLET | Refills: 0 | Status: SHIPPED | OUTPATIENT
Start: 2024-12-12 | End: 2024-12-19

## 2024-12-12 ASSESSMENT — ENCOUNTER SYMPTOMS
SPEECH DIFFICULTY: 0
SLEEP DISTURBANCE: 0
CONFUSION: 0
SORE THROAT: 0
WHEEZING: 0
DIFFICULTY URINATING: 0
BACK PAIN: 1
MYALGIAS: 0
NUMBNESS: 0
VOMITING: 0
EYE PAIN: 0
HEADACHES: 0
UNEXPECTED WEIGHT CHANGE: 0
COUGH: 0
FLANK PAIN: 0
FACIAL ASYMMETRY: 0
CHOKING: 0
FEVER: 0
CONSTIPATION: 0
SHORTNESS OF BREATH: 0
DIARRHEA: 0
EYE REDNESS: 0
ABDOMINAL PAIN: 0
FATIGUE: 0
TROUBLE SWALLOWING: 0
HEMATURIA: 0
APNEA: 0
ARTHRALGIAS: 1
WEAKNESS: 0
SEIZURES: 0
DIZZINESS: 0
PHOTOPHOBIA: 0
NAUSEA: 0
WOUND: 0
BLOOD IN STOOL: 0
PALPITATIONS: 0
CHILLS: 0
ABDOMINAL DISTENTION: 0
JOINT SWELLING: 0
DYSURIA: 0
NERVOUS/ANXIOUS: 0
CHEST TIGHTNESS: 0
NECK PAIN: 0
FREQUENCY: 0

## 2024-12-12 NOTE — PROGRESS NOTES
"Subjective   Patient ID: Boy Davis Jr. is a 54 y.o. male who presents for Follow-up (2-3 WK F/U BILAT EARS).      HPI:  Presents today for 3 WEEK RUPTURED EAR DRUM FU. PAIN HAS RESOLVED. DIZZINESS ON/OFF REMAIN. C/O LOW BACK PAIN THAT HAS INCREASED OVER THE PAST 1-2 WEEKS modifying factors consists of SEEN NEUROSURGERY FOR L5 FRACTURE.  associated symptoms consist of NO RADIATING PAIN CURRENTLY. PAIN 8/10 SCALE. TROUBLE SLEEPING. NO BOWEL OR BLADDER SYMPTOMS.   prior treatment consists of medication NAPROXEN       OARRS:  Nat Bryant, APRN-CNP on 12/12/2024  3:45 PM  I have personally reviewed the OARRS report for Boy Davis Jr.. I have considered the risks of abuse, dependence, addiction and diversion and I believe that it is clinically appropriate for Boy Davis Jr. to be prescribed this medication    Is the patient prescribed a combination of a benzodiazepine and opioid?  No    Last Urine Drug Screen / ordered today: No  No results found for this or any previous visit (from the past 8760 hours).  N/A NO UTOX OR MED AGREEMENT DONE R/T TEMP MED       Visit Vitals  /88   Pulse 64   Ht 1.88 m (6' 2\")   Wt 143 kg (315 lb 3.2 oz)   BMI 40.47 kg/m²   Smoking Status Every Day   BSA 2.73 m²        Review of Systems   Constitutional:  Negative for chills, fatigue, fever and unexpected weight change.   HENT:  Negative for congestion, ear pain, sore throat and trouble swallowing.    Eyes:  Negative for photophobia, pain, redness and visual disturbance.   Respiratory:  Negative for apnea, cough, choking, chest tightness, shortness of breath and wheezing.    Cardiovascular:  Negative for chest pain, palpitations and leg swelling.   Gastrointestinal:  Negative for abdominal distention, abdominal pain, blood in stool, constipation, diarrhea, nausea and vomiting.   Genitourinary:  Negative for difficulty urinating, dysuria, flank pain, frequency, hematuria and urgency.   Musculoskeletal:  Positive for " arthralgias and back pain. Negative for gait problem, joint swelling, myalgias and neck pain.   Skin:  Negative for rash and wound.   Neurological:  Negative for dizziness, seizures, syncope, facial asymmetry, speech difficulty, weakness, numbness and headaches.   Psychiatric/Behavioral:  Negative for confusion, sleep disturbance and suicidal ideas. The patient is not nervous/anxious.        Objective     Physical Exam  Constitutional:       Appearance: Normal appearance. He is normal weight.   HENT:      Head: Normocephalic.      Right Ear: Ear canal normal.      Left Ear: Ear canal normal.      Ears:      Comments: B/L TM RUPTURE HEALING NOTED   Eyes:      Extraocular Movements: Extraocular movements intact.      Conjunctiva/sclera: Conjunctivae normal.      Pupils: Pupils are equal, round, and reactive to light.   Cardiovascular:      Rate and Rhythm: Normal rate and regular rhythm.      Pulses: Normal pulses.      Heart sounds: Normal heart sounds.   Pulmonary:      Effort: Pulmonary effort is normal.      Breath sounds: Normal breath sounds.   Musculoskeletal:         General: Normal range of motion.      Cervical back: Normal range of motion.   Skin:     General: Skin is warm and dry.   Neurological:      General: No focal deficit present.      Mental Status: He is alert and oriented to person, place, and time.   Psychiatric:         Mood and Affect: Mood normal.         Behavior: Behavior normal.         Thought Content: Thought content normal.         Judgment: Judgment normal.            Assessment/Plan   Problem List Items Addressed This Visit       Class 3 severe obesity due to excess calories with serious comorbidity and body mass index (BMI) of 40.0 to 44.9 in adult    Compression fracture of fifth lumbar vertebra (Multi)    Relevant Medications    HYDROcodone-acetaminophen (Norco) 5-325 mg tablet    predniSONE (Deltasone) 10 mg tablet    Ruptured ear drum, right - Primary    Relevant Medications     ofloxacin (Floxin) 0.3 % otic solution     Other Visit Diagnoses       Ruptured ear drum, left        Relevant Medications    ofloxacin (Floxin) 0.3 % otic solution        WE DISCUSSED MOST COMMON SIDE EFFECTS OF PRESCRIBED MEDICATIONS. INDICATIONS, RISK, COMPLICATIONS, AND ALTERNATIVES OF MEDICATION/THERAPEUTICS WERE EXPLAINED AND DISCUSSED. PLEASE MONITOR CLOSELY FOR ANY UNTOWARD SIDE EFFECTS OR COMPLICATIONS OF MEDICATIONS. PATIENT IS STRONGLY ADVISED TO BE COMPLIANT WITH RECOMMENDATIONS. QUESTIONS AND CONCERNS WERE ADDRESSED. INSTRUCTED TO CALL, RETURN SOONER, OR GO TO THE ER,  IF SYMPTOMS PERSIST OR WORSEN. THEY VOICED UNDERSTANDING AND  DENIES FURTHER QUESTIONS AT THIS TIME.    TIME CODE  1. PREPARATION FOR PATIENT'S VISIT (REVIEWING CHART, CURRENT MEDICAL RECORDS, OUTSIDE HEALTH PROVIDER RECORDS, PREVIOUS HISTORY, EXAM, TEST, PROCEDURE, AND MEDICATIONS)  2. FACE TO FACE ENCOUNTER OBTAINING HISTORY FROM THE PATIENT/FAMILY/CAREGIVERS; PERFORMING EVALUATION AND EXAMINATION; ORDERING TESTS OR PROCEDURES; REFERRING AND COMMUNICATING WITH OTHER HEALTHCARE PROVIDERS; COUNSELING AND EDUCATION OF THE PATIENT/FAMILY/CAREGIVERS; INDEPENDENTLY INTERPRETING RESULTS (TESTS, LABS, PROCEDURES, IMAGING) AND COMMUNICATING AND EXPLAINING RESULTS TO THE PATIENT/FAMILY/CAREGIVERS  3. COORDINATION OF CARE; PREPARING AND PRINTING DISCHARGE INSTRUCTIONS AND ANY EDUCATIONAL MATERIAL FOR THE PATIENT/FAMILY/CAREGIVERS. DOCUMENTING CLINICAL INFORMATION IN THE ELECTRONIC MEDICAL RECORD   4. REVIEWING OARRS AS NEEDED    MDM  1) COMPLEXITY: MORE THAN 1 STABLE CHRONIC CONDITION ADDRESSED OR 1 ACUTE ILLNESS ADDRESSED   2)DATA: TESTS INTERPRETED AND OR ORDERED, TOOK INDEPENDENT HISTORY OR RECORDS REVIEWED  3)RISK: MODERATE RISK DUE TO NATURE OF MEDICAL CONDITIONS/COMORBIDITY OR MEDICATIONS ORDERED OR SURGICAL OR PROCEDURE REFERRAL    Follow up as before

## 2024-12-17 DIAGNOSIS — M54.50 LOW BACK PAIN RADIATING TO RIGHT LEG: Primary | ICD-10-CM

## 2024-12-17 DIAGNOSIS — M79.604 LOW BACK PAIN RADIATING TO RIGHT LEG: Primary | ICD-10-CM

## 2024-12-17 DIAGNOSIS — S32.050S COMPRESSION FRACTURE OF L5 VERTEBRA, SEQUELA: ICD-10-CM

## 2024-12-17 RX ORDER — NAPROXEN 500 MG/1
500 TABLET ORAL
Qty: 60 TABLET | Refills: 3 | Status: SHIPPED | OUTPATIENT
Start: 2024-12-17

## 2024-12-17 RX ORDER — NAPROXEN 500 MG/1
500 TABLET ORAL
COMMUNITY
End: 2024-12-17 | Stop reason: SDUPTHER

## 2025-01-23 ENCOUNTER — APPOINTMENT (OUTPATIENT)
Dept: GASTROENTEROLOGY | Facility: CLINIC | Age: 55
End: 2025-01-23
Payer: COMMERCIAL

## 2025-02-18 ENCOUNTER — APPOINTMENT (OUTPATIENT)
Dept: PRIMARY CARE | Facility: CLINIC | Age: 55
End: 2025-02-18
Payer: COMMERCIAL

## 2025-03-02 LAB
ALBUMIN SERPL-MCNC: 4 G/DL (ref 3.6–5.1)
ALP SERPL-CCNC: 49 U/L (ref 35–144)
ALT SERPL-CCNC: 12 U/L (ref 9–46)
ANION GAP SERPL CALCULATED.4IONS-SCNC: 7 MMOL/L (CALC) (ref 7–17)
AST SERPL-CCNC: 12 U/L (ref 10–35)
BASOPHILS # BLD AUTO: 68 CELLS/UL (ref 0–200)
BASOPHILS NFR BLD AUTO: 1 %
BILIRUB SERPL-MCNC: 0.4 MG/DL (ref 0.2–1.2)
BUN SERPL-MCNC: 24 MG/DL (ref 7–25)
CALCIUM SERPL-MCNC: 9.1 MG/DL (ref 8.6–10.3)
CHLORIDE SERPL-SCNC: 110 MMOL/L (ref 98–110)
CHOLEST SERPL-MCNC: 116 MG/DL
CHOLEST/HDLC SERPL: 3 (CALC)
CO2 SERPL-SCNC: 26 MMOL/L (ref 20–32)
CREAT SERPL-MCNC: 0.78 MG/DL (ref 0.7–1.3)
EGFRCR SERPLBLD CKD-EPI 2021: 105 ML/MIN/1.73M2
EOSINOPHIL # BLD AUTO: 177 CELLS/UL (ref 15–500)
EOSINOPHIL NFR BLD AUTO: 2.6 %
ERYTHROCYTE [DISTWIDTH] IN BLOOD BY AUTOMATED COUNT: 14.4 % (ref 11–15)
EST. AVERAGE GLUCOSE BLD GHB EST-MCNC: 117 MG/DL
EST. AVERAGE GLUCOSE BLD GHB EST-SCNC: 6.5 MMOL/L
GLUCOSE SERPL-MCNC: 94 MG/DL (ref 65–99)
HBA1C MFR BLD: 5.7 % OF TOTAL HGB
HCT VFR BLD AUTO: 45 % (ref 38.5–50)
HDLC SERPL-MCNC: 39 MG/DL
HGB BLD-MCNC: 14.6 G/DL (ref 13.2–17.1)
LDLC SERPL CALC-MCNC: 62 MG/DL (CALC)
LYMPHOCYTES # BLD AUTO: 2101 CELLS/UL (ref 850–3900)
LYMPHOCYTES NFR BLD AUTO: 30.9 %
MCH RBC QN AUTO: 27 PG (ref 27–33)
MCHC RBC AUTO-ENTMCNC: 32.4 G/DL (ref 32–36)
MCV RBC AUTO: 83.3 FL (ref 80–100)
MONOCYTES # BLD AUTO: 660 CELLS/UL (ref 200–950)
MONOCYTES NFR BLD AUTO: 9.7 %
NEUTROPHILS # BLD AUTO: 3794 CELLS/UL (ref 1500–7800)
NEUTROPHILS NFR BLD AUTO: 55.8 %
NONHDLC SERPL-MCNC: 77 MG/DL (CALC)
PLATELET # BLD AUTO: 430 THOUSAND/UL (ref 140–400)
PMV BLD REES-ECKER: 10.7 FL (ref 7.5–12.5)
POTASSIUM SERPL-SCNC: 4.5 MMOL/L (ref 3.5–5.3)
PROT SERPL-MCNC: 6.2 G/DL (ref 6.1–8.1)
PSA SERPL-MCNC: 1.12 NG/ML
RBC # BLD AUTO: 5.4 MILLION/UL (ref 4.2–5.8)
SODIUM SERPL-SCNC: 143 MMOL/L (ref 135–146)
TRIGL SERPL-MCNC: 72 MG/DL
TSH SERPL-ACNC: 1.43 MIU/L (ref 0.4–4.5)
WBC # BLD AUTO: 6.8 THOUSAND/UL (ref 3.8–10.8)

## 2025-03-07 ENCOUNTER — APPOINTMENT (OUTPATIENT)
Dept: PRIMARY CARE | Facility: CLINIC | Age: 55
End: 2025-03-07
Payer: COMMERCIAL

## 2025-03-07 VITALS
HEART RATE: 64 BPM | HEIGHT: 74 IN | BODY MASS INDEX: 40.43 KG/M2 | DIASTOLIC BLOOD PRESSURE: 80 MMHG | WEIGHT: 315 LBS | SYSTOLIC BLOOD PRESSURE: 134 MMHG

## 2025-03-07 DIAGNOSIS — R79.89 ELEVATED PLATELET COUNT: ICD-10-CM

## 2025-03-07 DIAGNOSIS — E78.2 MIXED HYPERLIPIDEMIA DUE TO TYPE 2 DIABETES MELLITUS (MULTI): Primary | ICD-10-CM

## 2025-03-07 DIAGNOSIS — E66.01 CLASS 3 SEVERE OBESITY DUE TO EXCESS CALORIES WITH SERIOUS COMORBIDITY AND BODY MASS INDEX (BMI) OF 40.0 TO 44.9 IN ADULT: ICD-10-CM

## 2025-03-07 DIAGNOSIS — M25.552 BILATERAL HIP PAIN: ICD-10-CM

## 2025-03-07 DIAGNOSIS — J44.9 CHRONIC OBSTRUCTIVE PULMONARY DISEASE, UNSPECIFIED COPD TYPE (MULTI): ICD-10-CM

## 2025-03-07 DIAGNOSIS — I15.2 HYPERTENSION ASSOCIATED WITH DIABETES (MULTI): ICD-10-CM

## 2025-03-07 DIAGNOSIS — E11.69 MIXED HYPERLIPIDEMIA DUE TO TYPE 2 DIABETES MELLITUS (MULTI): Primary | ICD-10-CM

## 2025-03-07 DIAGNOSIS — M25.551 BILATERAL HIP PAIN: ICD-10-CM

## 2025-03-07 DIAGNOSIS — E11.59 HYPERTENSION ASSOCIATED WITH DIABETES (MULTI): ICD-10-CM

## 2025-03-07 DIAGNOSIS — I65.23 BILATERAL CAROTID ARTERY STENOSIS: ICD-10-CM

## 2025-03-07 DIAGNOSIS — E66.813 CLASS 3 SEVERE OBESITY DUE TO EXCESS CALORIES WITH SERIOUS COMORBIDITY AND BODY MASS INDEX (BMI) OF 40.0 TO 44.9 IN ADULT: ICD-10-CM

## 2025-03-07 DIAGNOSIS — K21.9 GASTROESOPHAGEAL REFLUX DISEASE, UNSPECIFIED WHETHER ESOPHAGITIS PRESENT: ICD-10-CM

## 2025-03-07 PROBLEM — M51.369 LUMBAR DEGENERATIVE DISC DISEASE: Status: RESOLVED | Noted: 2023-06-15 | Resolved: 2025-03-07

## 2025-03-07 PROBLEM — M79.604 LOW BACK PAIN RADIATING TO RIGHT LEG: Status: RESOLVED | Noted: 2023-04-19 | Resolved: 2025-03-07

## 2025-03-07 PROBLEM — K59.00 CONSTIPATION: Status: RESOLVED | Noted: 2024-11-07 | Resolved: 2025-03-07

## 2025-03-07 PROBLEM — R35.1 NOCTURIA: Status: RESOLVED | Noted: 2023-04-18 | Resolved: 2025-03-07

## 2025-03-07 PROBLEM — R60.0 EDEMA, LOWER EXTREMITY: Status: RESOLVED | Noted: 2023-05-04 | Resolved: 2025-03-07

## 2025-03-07 PROBLEM — R00.2 PALPITATIONS: Status: RESOLVED | Noted: 2023-05-04 | Resolved: 2025-03-07

## 2025-03-07 PROBLEM — M54.41 ACUTE RIGHT-SIDED LOW BACK PAIN WITH RIGHT-SIDED SCIATICA: Status: RESOLVED | Noted: 2023-05-09 | Resolved: 2025-03-07

## 2025-03-07 PROBLEM — I87.2 STASIS DERMATITIS, ACUTE: Status: RESOLVED | Noted: 2023-05-04 | Resolved: 2025-03-07

## 2025-03-07 PROBLEM — J44.1 COPD EXACERBATION (MULTI): Status: RESOLVED | Noted: 2024-03-20 | Resolved: 2025-03-07

## 2025-03-07 PROBLEM — M54.50 LOW BACK PAIN RADIATING TO RIGHT LEG: Status: RESOLVED | Noted: 2023-04-19 | Resolved: 2025-03-07

## 2025-03-07 PROBLEM — R94.39 ABNORMAL STRESS TEST: Status: RESOLVED | Noted: 2023-05-04 | Resolved: 2025-03-07

## 2025-03-07 RX ORDER — OMEPRAZOLE 40 MG/1
40 CAPSULE, DELAYED RELEASE ORAL DAILY
Qty: 90 CAPSULE | Refills: 3 | Status: SHIPPED | OUTPATIENT
Start: 2025-03-07

## 2025-03-07 RX ORDER — ATORVASTATIN CALCIUM 40 MG/1
40 TABLET, FILM COATED ORAL NIGHTLY
Qty: 90 TABLET | Refills: 3 | Status: SHIPPED | OUTPATIENT
Start: 2025-03-07

## 2025-03-07 RX ORDER — METOPROLOL SUCCINATE 100 MG/1
100 TABLET, EXTENDED RELEASE ORAL DAILY
Qty: 90 TABLET | Refills: 3 | Status: SHIPPED | OUTPATIENT
Start: 2025-03-07 | End: 2026-03-07

## 2025-03-07 RX ORDER — LISINOPRIL 2.5 MG/1
2.5 TABLET ORAL DAILY
Qty: 90 TABLET | Refills: 3 | Status: SHIPPED | OUTPATIENT
Start: 2025-03-07

## 2025-03-07 RX ORDER — TIRZEPATIDE 2.5 MG/.5ML
2.5 INJECTION, SOLUTION SUBCUTANEOUS
Qty: 2 ML | Refills: 11 | Status: SHIPPED | OUTPATIENT
Start: 2025-03-09

## 2025-03-07 RX ORDER — ASPIRIN 81 MG/1
81 TABLET ORAL DAILY
Qty: 90 TABLET | Refills: 3 | Status: SHIPPED | OUTPATIENT
Start: 2025-03-07

## 2025-03-07 ASSESSMENT — ENCOUNTER SYMPTOMS
VOMITING: 0
CONSTIPATION: 0
CONFUSION: 0
FATIGUE: 0
PHOTOPHOBIA: 0
CHOKING: 0
DIFFICULTY URINATING: 0
MYALGIAS: 0
PALPITATIONS: 0
WEAKNESS: 0
ABDOMINAL PAIN: 0
FLANK PAIN: 0
UNEXPECTED WEIGHT CHANGE: 0
HEADACHES: 0
DIZZINESS: 0
ARTHRALGIAS: 0
CHILLS: 0
SLEEP DISTURBANCE: 0
WOUND: 0
FREQUENCY: 0
NERVOUS/ANXIOUS: 0
EYE REDNESS: 0
SPEECH DIFFICULTY: 0
WHEEZING: 0
TROUBLE SWALLOWING: 0
DYSURIA: 0
SORE THROAT: 0
ABDOMINAL DISTENTION: 0
NECK PAIN: 0
FACIAL ASYMMETRY: 0
COUGH: 0
APNEA: 0
HEMATURIA: 0
SEIZURES: 0
SHORTNESS OF BREATH: 0
NAUSEA: 0
DIARRHEA: 0
FEVER: 0
NUMBNESS: 0
JOINT SWELLING: 0
EYE PAIN: 0
BACK PAIN: 0
CHEST TIGHTNESS: 0
BLOOD IN STOOL: 0

## 2025-03-07 NOTE — PROGRESS NOTES
"Subjective   Patient ID: Boy Davis Jr. is a 54 y.o. male who presents for Follow-up (6 month follow up).    HPI:  Presents today for 6 MONTHS WITH LABS.      BILATERAL HIP PAIN- FOLLOWING WITH [facility] AT [facility] IN [place]. HE WAS TO HAVE HIP REPLACEMENT BUT HAD COVID. HE NEEDS TO CALL TO RESCHEDULE SURGERY   DM- STABLE  LIPIDS- STABLE  PLTS- SLIGHTLY ELEVATED . WILL RECHECK IN 6 MONTHS   COLON WAS CANCELED R/T COVID, NOW HE WANTS TO WAIT UNTIL AFTER HIP SURGERY TO HAVE IT DONE      Visit Vitals  /80   Pulse 64   Ht 1.88 m (6' 2\")   Wt 144 kg (316 lb 9.6 oz)   BMI 40.65 kg/m²   Smoking Status Every Day   BSA 2.74 m²        Review of Systems   Constitutional:  Negative for chills, fatigue, fever and unexpected weight change.   HENT:  Negative for congestion, ear pain, sore throat and trouble swallowing.    Eyes:  Negative for photophobia, pain, redness and visual disturbance.   Respiratory:  Negative for apnea, cough, choking, chest tightness, shortness of breath and wheezing.    Cardiovascular:  Negative for chest pain, palpitations and leg swelling.   Gastrointestinal:  Negative for abdominal distention, abdominal pain, blood in stool, constipation, diarrhea, nausea and vomiting.   Genitourinary:  Negative for difficulty urinating, dysuria, flank pain, frequency, hematuria and urgency.   Musculoskeletal:  Negative for arthralgias, back pain, gait problem, joint swelling, myalgias and neck pain.   Skin:  Negative for rash and wound.   Neurological:  Negative for dizziness, seizures, syncope, facial asymmetry, speech difficulty, weakness, numbness and headaches.   Psychiatric/Behavioral:  Negative for confusion, sleep disturbance and suicidal ideas. The patient is not nervous/anxious.        Objective   Component      Latest Ref Rng 3/1/2025   WHITE BLOOD CELL COUNT      3.8 - 10.8 Thousand/uL 6.8    RED BLOOD CELL COUNT      4.20 - 5.80 Million/uL 5.40    HEMOGLOBIN      13.2 - 17.1 g/dL " 14.6    HEMATOCRIT      38.5 - 50.0 % 45.0    MCV      80.0 - 100.0 fL 83.3    MCH      27.0 - 33.0 pg 27.0    MCHC      32.0 - 36.0 g/dL 32.4    RDW      11.0 - 15.0 % 14.4    PLATELET COUNT      140 - 400 Thousand/uL 430 (H)    MPV      7.5 - 12.5 fL 10.7    ABSOLUTE NEUTROPHILS      1,500 - 7,800 cells/uL 3,794    ABSOLUTE LYMPHOCYTES      850 - 3,900 cells/uL 2,101    ABSOLUTE MONOCYTES      200 - 950 cells/uL 660    ABSOLUTE EOSINOPHILS      15 - 500 cells/uL 177    ABSOLUTE BASOPHILS      0 - 200 cells/uL 68    NEUTROPHILS      % 55.8    LYMPHOCYTES      % 30.9    MONOCYTES      % 9.7    EOSINOPHILS      % 2.6    BASOPHILS      % 1.0    GLUCOSE      65 - 99 mg/dL 94    UREA NITROGEN (BUN)      7 - 25 mg/dL 24    CREATININE      0.70 - 1.30 mg/dL 0.78    EGFR      > OR = 60 mL/min/1.73m2 105    SODIUM      135 - 146 mmol/L 143    POTASSIUM      3.5 - 5.3 mmol/L 4.5    CHLORIDE      98 - 110 mmol/L 110    CARBON DIOXIDE      20 - 32 mmol/L 26    ELECTROLYTE BALANCE      7 - 17 mmol/L (calc) 7    CALCIUM      8.6 - 10.3 mg/dL 9.1    PROTEIN, TOTAL      6.1 - 8.1 g/dL 6.2    ALBUMIN      3.6 - 5.1 g/dL 4.0    BILIRUBIN, TOTAL      0.2 - 1.2 mg/dL 0.4    ALKALINE PHOSPHATASE      35 - 144 U/L 49    AST      10 - 35 U/L 12    ALT      9 - 46 U/L 12    CHOLESTEROL, TOTAL      <200 mg/dL 116    HDL CHOLESTEROL      > OR = 40 mg/dL 39 (L)    TRIGLYCERIDES      <150 mg/dL 72    LDL-CHOLESTEROL      mg/dL (calc) 62    CHOL/HDLC RATIO      <5.0 (calc) 3.0    NON HDL CHOLESTEROL      <130 mg/dL (calc) 77    HEMOGLOBIN A1c      <5.7 % of total Hgb 5.7 (H)    eAG (mg/dL)      mg/dL 117    eAG (mmol/L)      mmol/L 6.5    PSA, TOTAL      < OR = 4.00 ng/mL 1.12    TSH      0.40 - 4.50 mIU/L 1.43       Legend:  (H) High  (L) Low  Physical Exam  Constitutional:       Appearance: Normal appearance. He is normal weight.   HENT:      Head: Normocephalic.   Eyes:      Extraocular Movements: Extraocular movements intact.       Conjunctiva/sclera: Conjunctivae normal.      Pupils: Pupils are equal, round, and reactive to light.   Cardiovascular:      Rate and Rhythm: Normal rate and regular rhythm.      Pulses: Normal pulses.      Heart sounds: Normal heart sounds.   Pulmonary:      Effort: Pulmonary effort is normal.      Breath sounds: Normal breath sounds.   Musculoskeletal:         General: Normal range of motion.      Cervical back: Normal range of motion.   Skin:     General: Skin is warm and dry.   Neurological:      General: No focal deficit present.      Mental Status: He is alert and oriented to person, place, and time.   Psychiatric:         Mood and Affect: Mood normal.         Behavior: Behavior normal.         Thought Content: Thought content normal.         Judgment: Judgment normal.            Assessment/Plan   Problem List Items Addressed This Visit       Chronic obstructive pulmonary disease (Multi)    Relevant Medications    fluticasone-umeclidin-vilanter (TRELEGY-ELLIPTA) 100-62.5-25 mcg blister with device    GERD (gastroesophageal reflux disease)    Relevant Medications    omeprazole (PriLOSEC) 40 mg DR capsule    Hypertension associated with diabetes (Multi)    Relevant Medications    tirzepatide (Mounjaro) 2.5 mg/0.5 mL pen injector (Start on 3/9/2025)    metoprolol succinate XL (Toprol XL) 100 mg 24 hr tablet    lisinopril 2.5 mg tablet    Other Relevant Orders    Albumin-Creatinine Ratio, Urine Random    Mixed hyperlipidemia due to type 2 diabetes mellitus (Multi) - Primary    Relevant Medications    atorvastatin (Lipitor) 40 mg tablet    Other Relevant Orders    CBC and Auto Differential    Comprehensive Metabolic Panel    Hemoglobin A1C    TSH with reflex to Free T4 if abnormal    Class 3 severe obesity due to excess calories with serious comorbidity and body mass index (BMI) of 40.0 to 44.9 in adult    Bilateral carotid artery stenosis    Relevant Medications    aspirin 81 mg EC tablet    Elevated platelet  count     Other Visit Diagnoses       Bilateral hip pain        Relevant Medications    dexAMETHasone (Decadron) injection 4 mg (Completed) (Start on 3/7/2025  4:15 PM)        WE DISCUSSED MOST COMMON SIDE EFFECTS OF PRESCRIBED MEDICATIONS. INDICATIONS, RISK, COMPLICATIONS, AND ALTERNATIVES OF MEDICATION/THERAPEUTICS WERE EXPLAINED AND DISCUSSED. PLEASE MONITOR CLOSELY FOR ANY UNTOWARD SIDE EFFECTS OR COMPLICATIONS OF MEDICATIONS. PATIENT IS STRONGLY ADVISED TO BE COMPLIANT WITH RECOMMENDATIONS. QUESTIONS AND CONCERNS WERE ADDRESSED. INSTRUCTED TO CALL, RETURN SOONER, OR GO TO THE ER,  IF SYMPTOMS PERSIST OR WORSEN. THEY VOICED UNDERSTANDING AND  DENIES FURTHER QUESTIONS AT THIS TIME.    TIME CODE  1. PREPARATION FOR PATIENT'S VISIT (REVIEWING CHART, CURRENT MEDICAL RECORDS, OUTSIDE HEALTH PROVIDER RECORDS, PREVIOUS HISTORY, EXAM, TEST, PROCEDURE, AND MEDICATIONS)  2. FACE TO FACE ENCOUNTER OBTAINING HISTORY FROM THE PATIENT/FAMILY/CAREGIVERS; PERFORMING EVALUATION AND EXAMINATION; ORDERING TESTS OR PROCEDURES; REFERRING AND COMMUNICATING WITH OTHER HEALTHCARE PROVIDERS; COUNSELING AND EDUCATION OF THE PATIENT/FAMILY/CAREGIVERS; INDEPENDENTLY INTERPRETING RESULTS (TESTS, LABS, PROCEDURES, IMAGING) AND COMMUNICATING AND EXPLAINING RESULTS TO THE PATIENT/FAMILY/CAREGIVERS  3. COORDINATION OF CARE; PREPARING AND PRINTING DISCHARGE INSTRUCTIONS AND ANY EDUCATIONAL MATERIAL FOR THE PATIENT/FAMILY/CAREGIVERS. DOCUMENTING CLINICAL INFORMATION IN THE ELECTRONIC MEDICAL RECORD   4. REVIEWING OARRS AS NEEDED    MDM  1) COMPLEXITY: MORE THAN 1 STABLE CHRONIC CONDITION ADDRESSED OR 1 ACUTE ILLNESS ADDRESSED   2)DATA: TESTS INTERPRETED AND OR ORDERED, TOOK INDEPENDENT HISTORY OR RECORDS REVIEWED  3)RISK: MODERATE RISK DUE TO NATURE OF MEDICAL CONDITIONS/COMORBIDITY OR MEDICATIONS ORDERED OR SURGICAL OR PROCEDURE REFERRAL    6 MONTHS WITH LABS

## 2025-03-19 ENCOUNTER — APPOINTMENT (OUTPATIENT)
Dept: PRIMARY CARE | Facility: CLINIC | Age: 55
End: 2025-03-19
Payer: COMMERCIAL

## 2025-03-20 ENCOUNTER — TELEMEDICINE (OUTPATIENT)
Dept: PRIMARY CARE | Facility: CLINIC | Age: 55
End: 2025-03-20
Payer: COMMERCIAL

## 2025-03-20 ENCOUNTER — HOSPITAL ENCOUNTER (OUTPATIENT)
Dept: RADIOLOGY | Facility: HOSPITAL | Age: 55
Discharge: HOME | End: 2025-03-20
Payer: COMMERCIAL

## 2025-03-20 VITALS — WEIGHT: 315 LBS | BODY MASS INDEX: 40.43 KG/M2 | HEIGHT: 74 IN

## 2025-03-20 DIAGNOSIS — E66.813 CLASS 3 SEVERE OBESITY DUE TO EXCESS CALORIES WITH SERIOUS COMORBIDITY AND BODY MASS INDEX (BMI) OF 40.0 TO 44.9 IN ADULT: ICD-10-CM

## 2025-03-20 DIAGNOSIS — J44.1 COPD EXACERBATION (MULTI): Primary | ICD-10-CM

## 2025-03-20 DIAGNOSIS — E66.01 CLASS 3 SEVERE OBESITY DUE TO EXCESS CALORIES WITH SERIOUS COMORBIDITY AND BODY MASS INDEX (BMI) OF 40.0 TO 44.9 IN ADULT: ICD-10-CM

## 2025-03-20 DIAGNOSIS — J44.1 COPD EXACERBATION (MULTI): ICD-10-CM

## 2025-03-20 PROCEDURE — 99214 OFFICE O/P EST MOD 30 MIN: CPT | Performed by: NURSE PRACTITIONER

## 2025-03-20 PROCEDURE — 96372 THER/PROPH/DIAG INJ SC/IM: CPT | Performed by: NURSE PRACTITIONER

## 2025-03-20 PROCEDURE — 3008F BODY MASS INDEX DOCD: CPT | Performed by: NURSE PRACTITIONER

## 2025-03-20 PROCEDURE — 4004F PT TOBACCO SCREEN RCVD TLK: CPT | Performed by: NURSE PRACTITIONER

## 2025-03-20 PROCEDURE — 4010F ACE/ARB THERAPY RXD/TAKEN: CPT | Performed by: NURSE PRACTITIONER

## 2025-03-20 PROCEDURE — 71046 X-RAY EXAM CHEST 2 VIEWS: CPT

## 2025-03-20 RX ORDER — LEVOFLOXACIN 500 MG/1
500 TABLET, FILM COATED ORAL DAILY
Qty: 5 TABLET | Refills: 0 | Status: SHIPPED | OUTPATIENT
Start: 2025-03-20 | End: 2025-03-25

## 2025-03-20 RX ORDER — PREDNISONE 10 MG/1
30 TABLET ORAL DAILY
Qty: 15 TABLET | Refills: 0 | Status: SHIPPED | OUTPATIENT
Start: 2025-03-20

## 2025-03-20 ASSESSMENT — ENCOUNTER SYMPTOMS
TROUBLE SWALLOWING: 0
FEVER: 0
WOUND: 0
NUMBNESS: 0
CONSTIPATION: 0
FATIGUE: 1
PALPITATIONS: 0
SPEECH DIFFICULTY: 0
CONFUSION: 0
HEADACHES: 1
DIARRHEA: 0
DYSURIA: 0
BLOOD IN STOOL: 0
ABDOMINAL PAIN: 0
PHOTOPHOBIA: 0
VOMITING: 0
EYE REDNESS: 0
EYE PAIN: 0
JOINT SWELLING: 0
CHEST TIGHTNESS: 0
MYALGIAS: 0
DIZZINESS: 0
APNEA: 0
HEMATURIA: 0
NAUSEA: 0
NERVOUS/ANXIOUS: 0
NECK PAIN: 0
CHOKING: 0
CHILLS: 0
FACIAL ASYMMETRY: 0
ABDOMINAL DISTENTION: 0
SEIZURES: 0
FREQUENCY: 0
UNEXPECTED WEIGHT CHANGE: 0
COUGH: 0
SHORTNESS OF BREATH: 1
DIFFICULTY URINATING: 0
BACK PAIN: 1
FLANK PAIN: 0
WEAKNESS: 0
SORE THROAT: 0
SLEEP DISTURBANCE: 0
ARTHRALGIAS: 0
WHEEZING: 0

## 2025-03-20 NOTE — PROGRESS NOTES
"Subjective   Patient ID: Boy Davis Jr. is a 54 y.o. male who presents for Fatigue (FATIGUE, BREATHING, HEADACHE, RT BACK PAIN THAT COMES AND GOES, NO FEVER).    Virtual or Telephone Consent    An interactive audio and video telecommunication system which permits real time communications between the patient (at the originating site) and provider (at the distant site) was utilized to provide this telehealth service.   Verbal consent was requested and obtained from Boy Davis Jr. on this date, 03/20/25 for a telehealth visit and the patient's location was confirmed at the time of the visit.     HPI:  Presents today for C/O COUGH AND FATIGUE X 1 WEEK  modifying factors consists of HE HAS COPD. NEGATIVE FOR COVID X 2  associated symptoms consist of HA.  SLIGHT SOB. WHEEZE prior treatment consists of medication TRELEGY 100MG    Visit Vitals  Ht 1.88 m (6' 2\")   Wt 143 kg (316 lb)   BMI 40.57 kg/m²   Smoking Status Every Day   BSA 2.73 m²        Review of Systems   Constitutional:  Positive for fatigue. Negative for chills, fever and unexpected weight change.   HENT:  Negative for congestion, ear pain, sore throat and trouble swallowing.    Eyes:  Negative for photophobia, pain, redness and visual disturbance.   Respiratory:  Positive for shortness of breath. Negative for apnea, cough, choking, chest tightness and wheezing.    Cardiovascular:  Negative for chest pain, palpitations and leg swelling.   Gastrointestinal:  Negative for abdominal distention, abdominal pain, blood in stool, constipation, diarrhea, nausea and vomiting.   Genitourinary:  Negative for difficulty urinating, dysuria, flank pain, frequency, hematuria and urgency.   Musculoskeletal:  Positive for back pain (MID BACK). Negative for arthralgias, gait problem, joint swelling, myalgias and neck pain.   Skin:  Negative for rash and wound.   Neurological:  Positive for headaches. Negative for dizziness, seizures, syncope, facial asymmetry, speech " difficulty, weakness and numbness.   Psychiatric/Behavioral:  Negative for confusion, sleep disturbance and suicidal ideas. The patient is not nervous/anxious.        Objective     Physical Exam  Neurological:      Mental Status: He is alert and oriented to person, place, and time.   Psychiatric:         Mood and Affect: Mood normal.         Behavior: Behavior normal.         Thought Content: Thought content normal.         Judgment: Judgment normal.            Assessment/Plan   Problem List Items Addressed This Visit       Class 3 severe obesity due to excess calories with serious comorbidity and body mass index (BMI) of 40.0 to 44.9 in adult     Other Visit Diagnoses       COPD exacerbation (Multi)    -  Primary    Relevant Medications    dexAMETHasone (Decadron) injection 4 mg (Completed)    levoFLOXacin (Levaquin) 500 mg tablet    predniSONE (Deltasone) 10 mg tablet    Other Relevant Orders    XR chest 2 views        AFTER HIS VIRTUAL APPT HE CAME IN OFFICE FOR A DEXA INJECTION    DO CXR TODAY. I AM CONCERN FOR PNA SI I WILL START HIM ON LEVAQUIN A PO PREDNISONE    WE DISCUSSED MOST COMMON SIDE EFFECTS OF PRESCRIBED MEDICATIONS. INDICATIONS, RISK, COMPLICATIONS, AND ALTERNATIVES OF MEDICATION/THERAPEUTICS WERE EXPLAINED AND DISCUSSED. PLEASE MONITOR CLOSELY FOR ANY UNTOWARD SIDE EFFECTS OR COMPLICATIONS OF MEDICATIONS. PATIENT IS STRONGLY ADVISED TO BE COMPLIANT WITH RECOMMENDATIONS. QUESTIONS AND CONCERNS WERE ADDRESSED. INSTRUCTED TO CALL, RETURN SOONER, OR GO TO THE ER,  IF SYMPTOMS PERSIST OR WORSEN. THEY VOICED UNDERSTANDING AND  DENIES FURTHER QUESTIONS AT THIS TIME.  TIME CODE  1. PREPARATION FOR PATIENT'S VISIT (REVIEWING CHART, CURRENT MEDICAL RECORDS, OUTSIDE HEALTH PROVIDER RECORDS, PREVIOUS HISTORY, EXAM, TEST, PROCEDURE, AND MEDICATIONS)  2. FACE TO FACE ENCOUNTER OBTAINING HISTORY FROM THE PATIENT/FAMILY/CAREGIVERS; PERFORMING EVALUATION AND EXAMINATION; ORDERING TESTS OR PROCEDURES; REFERRING AND  COMMUNICATING WITH OTHER HEALTHCARE PROVIDERS; COUNSELING AND EDUCATION OF THE PATIENT/FAMILY/CAREGIVERS; INDEPENDENTLY INTERPRETING RESULTS (TESTS, LABS, PROCEDURES, IMAGING) AND COMMUNICATING AND EXPLAINING RESULTS TO THE PATIENT/FAMILY/CAREGIVERS  3. COORDINATION OF CARE; PREPARING AND PRINTING DISCHARGE INSTRUCTIONS AND ANY EDUCATIONAL MATERIAL FOR THE PATIENT/FAMILY/CAREGIVERS. DOCUMENTING CLINICAL INFORMATION IN THE ELECTRONIC MEDICAL RECORD   4. REVIEWING OARRS AS NEEDED  MDM  1) COMPLEXITY: MORE THAN 1 STABLE CHRONIC CONDITION ADDRESSED OR 1 ACUTE ILLNESS ADDRESSED   2)DATA: TESTS INTERPRETED AND OR ORDERED, TOOK INDEPENDENT HISTORY OR RECORDS REVIEWED  3)RISK: MODERATE RISK DUE TO NATURE OF MEDICAL CONDITIONS/COMORBIDITY OR MEDICATIONS ORDERED OR SURGICAL OR PROCEDURE REFERRAL    NEXT WEEK IN OFFICE

## 2025-03-20 NOTE — LETTER
March 20, 2025     Patient: Boy Davis Jr.   YOB: 1970   Date of Visit: 3/20/2025       To Whom It May Concern:    Boy Davis was seen in my clinic on 3/20/2025 at 9:20 am. Please excuse Boy for his absence from work on 3/17/25- 3/21/25. If you have any questions or concerns, please don't hesitate to call.         Sincerely,         Nat Bryant, APRN-CNP

## 2025-03-21 ENCOUNTER — APPOINTMENT (OUTPATIENT)
Dept: PRIMARY CARE | Facility: CLINIC | Age: 55
End: 2025-03-21
Payer: COMMERCIAL

## 2025-03-26 ENCOUNTER — APPOINTMENT (OUTPATIENT)
Dept: PRIMARY CARE | Facility: CLINIC | Age: 55
End: 2025-03-26
Payer: COMMERCIAL

## 2025-03-26 VITALS
WEIGHT: 315 LBS | SYSTOLIC BLOOD PRESSURE: 148 MMHG | DIASTOLIC BLOOD PRESSURE: 97 MMHG | HEART RATE: 71 BPM | HEIGHT: 74 IN | BODY MASS INDEX: 40.43 KG/M2

## 2025-03-26 DIAGNOSIS — R91.8 LUNG NODULES: ICD-10-CM

## 2025-03-26 DIAGNOSIS — E66.813 CLASS 3 SEVERE OBESITY DUE TO EXCESS CALORIES WITH SERIOUS COMORBIDITY AND BODY MASS INDEX (BMI) OF 40.0 TO 44.9 IN ADULT: Primary | ICD-10-CM

## 2025-03-26 DIAGNOSIS — E61.1 LOW IRON: ICD-10-CM

## 2025-03-26 DIAGNOSIS — R42 DIZZINESS: ICD-10-CM

## 2025-03-26 DIAGNOSIS — I65.23 BILATERAL CAROTID ARTERY STENOSIS: ICD-10-CM

## 2025-03-26 DIAGNOSIS — I15.2 HYPERTENSION ASSOCIATED WITH DIABETES: ICD-10-CM

## 2025-03-26 DIAGNOSIS — E11.59 HYPERTENSION ASSOCIATED WITH DIABETES: ICD-10-CM

## 2025-03-26 DIAGNOSIS — S32.050A COMPRESSION FRACTURE OF L5 VERTEBRA, INITIAL ENCOUNTER (MULTI): ICD-10-CM

## 2025-03-26 DIAGNOSIS — E66.01 CLASS 3 SEVERE OBESITY DUE TO EXCESS CALORIES WITH SERIOUS COMORBIDITY AND BODY MASS INDEX (BMI) OF 40.0 TO 44.9 IN ADULT: Primary | ICD-10-CM

## 2025-03-26 DIAGNOSIS — I48.21 PERMANENT ATRIAL FIBRILLATION (MULTI): ICD-10-CM

## 2025-03-26 DIAGNOSIS — R79.89 ELEVATED PLATELET COUNT: ICD-10-CM

## 2025-03-26 PROCEDURE — 3077F SYST BP >= 140 MM HG: CPT | Performed by: NURSE PRACTITIONER

## 2025-03-26 PROCEDURE — 4010F ACE/ARB THERAPY RXD/TAKEN: CPT | Performed by: NURSE PRACTITIONER

## 2025-03-26 PROCEDURE — 99214 OFFICE O/P EST MOD 30 MIN: CPT | Performed by: NURSE PRACTITIONER

## 2025-03-26 PROCEDURE — 4004F PT TOBACCO SCREEN RCVD TLK: CPT | Performed by: NURSE PRACTITIONER

## 2025-03-26 PROCEDURE — 3080F DIAST BP >= 90 MM HG: CPT | Performed by: NURSE PRACTITIONER

## 2025-03-26 PROCEDURE — 3008F BODY MASS INDEX DOCD: CPT | Performed by: NURSE PRACTITIONER

## 2025-03-26 RX ORDER — LISINOPRIL 5 MG/1
5 TABLET ORAL DAILY
Qty: 90 TABLET | Refills: 3 | Status: SHIPPED | OUTPATIENT
Start: 2025-03-26

## 2025-03-26 ASSESSMENT — ENCOUNTER SYMPTOMS
ABDOMINAL DISTENTION: 0
FREQUENCY: 0
APNEA: 0
NAUSEA: 0
DIFFICULTY URINATING: 0
TROUBLE SWALLOWING: 0
DIZZINESS: 0
WOUND: 0
FACIAL ASYMMETRY: 0
PHOTOPHOBIA: 0
VOMITING: 0
SORE THROAT: 0
EYE PAIN: 0
JOINT SWELLING: 0
ABDOMINAL PAIN: 0
SLEEP DISTURBANCE: 0
HEMATURIA: 0
NECK PAIN: 0
COUGH: 1
FEVER: 0
UNEXPECTED WEIGHT CHANGE: 0
HEADACHES: 0
CHILLS: 0
DYSURIA: 0
CHEST TIGHTNESS: 0
SHORTNESS OF BREATH: 1
BLOOD IN STOOL: 0
ARTHRALGIAS: 0
CHOKING: 0
BACK PAIN: 0
FLANK PAIN: 0
SEIZURES: 0
PALPITATIONS: 1
WHEEZING: 0
MYALGIAS: 0
NERVOUS/ANXIOUS: 0
CONSTIPATION: 0
EYE REDNESS: 0
WEAKNESS: 0
CONFUSION: 0
DIARRHEA: 0
FATIGUE: 1
NUMBNESS: 0
SPEECH DIFFICULTY: 0

## 2025-03-26 NOTE — PROGRESS NOTES
"Subjective   Patient ID: Boy Davis Jr. is a 54 y.o. male who presents for Follow-up (1 WEEK FOLLOW UP).      HPI:  Presents today for ILLNESS CHECK WITH CXR. HE IS NOT FEELING  MUCH BETTER. FATIGUE AND COUGH REMAINS. HE DOES HAVE COPD. NO WHEEZING. NO CP.  C/O DIZZINESS X 5-6 MONTHS  modifying factors consists of HAS AFIB. LAST SEEN CARDIO 9/6/24 AND WAS STABLE WITH HIS AFIB AT THAT TIME  associated symptoms consist of DAILY HEADACHE THAT COMES AND GOES. FEELS FLUTTERING IN CHEST ON/OFF, OCCURRING 4/7 DAYS PER WEEK.  SOB ON EXERTION ON/OFF prior treatment consists of medication BP MEDS    HTN- INCREASE LISINOPRIL TO 5MG DAILY FROM 2.5MG. MONITOR BP AT HOME     CAROTID STENOSIS- WILL ORDER DOPPLER  LUNG NODULES- WILL ORDER CT CHEST     Visit Vitals  BP (!) 148/97   Pulse 71   Ht 1.88 m (6' 2\")   Wt 145 kg (319 lb)   BMI 40.96 kg/m²   Smoking Status Every Day   BSA 2.75 m²        Review of Systems   Constitutional:  Positive for fatigue. Negative for chills, fever and unexpected weight change.   HENT:  Negative for congestion, ear pain, sore throat and trouble swallowing.    Eyes:  Negative for photophobia, pain, redness and visual disturbance.   Respiratory:  Positive for cough and shortness of breath. Negative for apnea, choking, chest tightness and wheezing.    Cardiovascular:  Positive for palpitations. Negative for chest pain and leg swelling.   Gastrointestinal:  Negative for abdominal distention, abdominal pain, blood in stool, constipation, diarrhea, nausea and vomiting.   Genitourinary:  Negative for difficulty urinating, dysuria, flank pain, frequency, hematuria and urgency.   Musculoskeletal:  Negative for arthralgias, back pain, gait problem, joint swelling, myalgias and neck pain.   Skin:  Negative for rash and wound.   Neurological:  Negative for dizziness, seizures, syncope, facial asymmetry, speech difficulty, weakness, numbness and headaches.   Psychiatric/Behavioral:  Negative for confusion, " sleep disturbance and suicidal ideas. The patient is not nervous/anxious.        Objective   Study Result    Narrative & Impression   Interpreted By:  Juan F Sharp,   STUDY:  XR CHEST 2 VIEWS;  3/20/2025 11:23 am      INDICATION:  Signs/Symptoms:COUGH.      COMPARISON:  Chest radiograph 05/21/2024      ACCESSION NUMBER(S):  [id number]      ORDERING CLINICIAN:  ERIC BARNARD      FINDINGS:  PA and lateral radiographs of the chest were provided.      DEVICES:  None      CARDIOMEDIASTINAL SILHOUETTE:  Cardiomediastinal silhouette is normal in size and configuration.No  significant atherosclerotic calcification.      LUNGS:  No focal consolidation. No pneumothorax. No pleural effusion.      BONES:  No acute osseous changes.      IMPRESSION:  1.  No evidence of acute cardiopulmonary process.         Physical Exam  Constitutional:       Appearance: Normal appearance. He is normal weight.   HENT:      Head: Normocephalic.   Eyes:      Extraocular Movements: Extraocular movements intact.      Conjunctiva/sclera: Conjunctivae normal.      Pupils: Pupils are equal, round, and reactive to light.   Cardiovascular:      Rate and Rhythm: Normal rate and regular rhythm.      Pulses: Normal pulses.      Heart sounds: Normal heart sounds.   Pulmonary:      Effort: Pulmonary effort is normal.      Breath sounds: Normal breath sounds.   Musculoskeletal:         General: Normal range of motion.      Cervical back: Normal range of motion.   Skin:     General: Skin is warm and dry.   Neurological:      General: No focal deficit present.      Mental Status: He is alert and oriented to person, place, and time.   Psychiatric:         Mood and Affect: Mood normal.         Behavior: Behavior normal.         Thought Content: Thought content normal.         Judgment: Judgment normal.            Assessment/Plan   Problem List Items Addressed This Visit       Hypertension associated with diabetes (Multi)    Relevant Medications    lisinopril  5 mg tablet    Lung nodules    Relevant Orders    CT chest wo IV contrast    Class 3 severe obesity due to excess calories with serious comorbidity and body mass index (BMI) of 40.0 to 44.9 in adult - Primary    Bilateral carotid artery stenosis    Relevant Orders    Vascular US Carotid Artery Duplex Bilateral    Compression fracture of fifth lumbar vertebra (Multi)    Permanent atrial fibrillation (Multi)    Elevated platelet count     Other Visit Diagnoses       Dizziness        Relevant Orders    Magnesium    Vitamin B12    Ferritin    Folate    Iron and TIBC    CBC and Auto Differential    CT head wo IV contrast    Low iron        Relevant Orders    Vitamin B12    Ferritin    Folate    Iron and TIBC          HE IS REFUSING, CT CALCIUM SCORE TEST, EKG IN OFFICE TODAY, AND A HOLTER MONITOR AT THIS TIME. WE DISCUSSED THE RISK ASSOCIATED WITH REFUSING, BUT HE CONTINUED TO REFUSE. HE IS CURRENTLY NOT IN AFIB, BUT I DID EXPLAIN HE COULD BE GOING IN AND OUT OF AN ABNORMAL RHYTHM AND A HOLTER WOULD BE THE BEST WAY TO CATCH IT. I ALSO EXPLAINED THAT AN EKG COULD SHOW A POTENTIAL ISSUE WITH HIS HEART, BUT HE CONTINUED TO REFUSE. INSTRUCTED HIM TO CALL HIS CARDIO TODAY/TOMORROW AND GET IN WITHIN THE NEXT WEEK/ASAP. IF SYMPTOMS PERSIST OR WORSEN, GO TO THE ER. HE DID AGREE TO CALL THEM FOR AN APPT.     I WILL ORDER CT HEAD TO R/O ANY MASS/TUMOR.     WE DISCUSSED MOST COMMON SIDE EFFECTS OF PRESCRIBED MEDICATIONS. INDICATIONS, RISK, COMPLICATIONS, AND ALTERNATIVES OF MEDICATION/THERAPEUTICS WERE EXPLAINED AND DISCUSSED. PLEASE MONITOR CLOSELY FOR ANY UNTOWARD SIDE EFFECTS OR COMPLICATIONS OF MEDICATIONS. PATIENT IS STRONGLY ADVISED TO BE COMPLIANT WITH RECOMMENDATIONS. QUESTIONS AND CONCERNS WERE ADDRESSED. INSTRUCTED TO CALL, RETURN SOONER, OR GO TO THE ER,  IF SYMPTOMS PERSIST OR WORSEN. THEY VOICED UNDERSTANDING AND  DENIES FURTHER QUESTIONS AT THIS TIME.    TIME CODE  1. PREPARATION FOR PATIENT'S VISIT (REVIEWING CHART,  CURRENT MEDICAL RECORDS, OUTSIDE HEALTH PROVIDER RECORDS, PREVIOUS HISTORY, EXAM, TEST, PROCEDURE, AND MEDICATIONS)  2. FACE TO FACE ENCOUNTER OBTAINING HISTORY FROM THE PATIENT/FAMILY/CAREGIVERS; PERFORMING EVALUATION AND EXAMINATION; ORDERING TESTS OR PROCEDURES; REFERRING AND COMMUNICATING WITH OTHER HEALTHCARE PROVIDERS; COUNSELING AND EDUCATION OF THE PATIENT/FAMILY/CAREGIVERS; INDEPENDENTLY INTERPRETING RESULTS (TESTS, LABS, PROCEDURES, IMAGING) AND COMMUNICATING AND EXPLAINING RESULTS TO THE PATIENT/FAMILY/CAREGIVERS  3. COORDINATION OF CARE; PREPARING AND PRINTING DISCHARGE INSTRUCTIONS AND ANY EDUCATIONAL MATERIAL FOR THE PATIENT/FAMILY/CAREGIVERS. DOCUMENTING CLINICAL INFORMATION IN THE ELECTRONIC MEDICAL RECORD   4. REVIEWING OARRS AS NEEDED    MDM  1) COMPLEXITY: MORE THAN 1 STABLE CHRONIC CONDITION ADDRESSED OR 1 ACUTE ILLNESS ADDRESSED   2)DATA: TESTS INTERPRETED AND OR ORDERED, TOOK INDEPENDENT HISTORY OR RECORDS REVIEWED  3)RISK: MODERATE RISK DUE TO NATURE OF MEDICAL CONDITIONS/COMORBIDITY OR MEDICATIONS ORDERED OR SURGICAL OR PROCEDURE REFERRAL    FU AFTER TESTING WITH LABS

## 2025-03-26 NOTE — LETTER
March 26, 2025     Patient: Boy Davis Jr.   YOB: 1970   Date of Visit: 3/26/2025       To Whom It May Concern:    Boy Davis was seen in my clinic on 3/26/2025.  Please excuse Boy for his absence from work on 3/24/25-3/26/25. He may return to work on 3/27/25. If you have any questions or concerns, please don't hesitate to call.         Sincerely,         Nat Bryant, LUCY-CNP

## 2025-03-31 ENCOUNTER — TELEPHONE (OUTPATIENT)
Dept: PRIMARY CARE | Facility: CLINIC | Age: 55
End: 2025-03-31
Payer: COMMERCIAL

## 2025-04-01 NOTE — TELEPHONE ENCOUNTER
CT HEAD DENIED  DUE TO NO DOCUMENTATION OF BRAIN PROBLEMS ON EXAM, TROUBLE SPEAKING CLEARLY, WEAKNESS IN ARMS OR LEGS, MEMORY ISSUES OR TROUBLE WALKING, HEARING OR BALANCE TESTS WERE NOT NORMAL, DIZZINESS CAUSED BY LOUD NOISES. CLINICAL NOTES STATE NON OF THESE PER INSURANCE.

## 2025-04-03 NOTE — TELEPHONE ENCOUNTER
PATIENT NOTIFIED WILL CONTINUE WITH CHEST CT AND DOPPLER TOMORROW.  IF MORE DOCUMENTATION CAN BE GIVEN AND SYMPTOMS AT NEXT APPOINTMENT I CAN TRY FOR AN APPEAL AT THAT TIME IF WISHED.

## 2025-04-04 ENCOUNTER — APPOINTMENT (OUTPATIENT)
Dept: RADIOLOGY | Facility: HOSPITAL | Age: 55
End: 2025-04-04
Payer: COMMERCIAL

## 2025-04-04 ENCOUNTER — HOSPITAL ENCOUNTER (OUTPATIENT)
Dept: VASCULAR MEDICINE | Facility: HOSPITAL | Age: 55
Discharge: HOME | End: 2025-04-04
Payer: COMMERCIAL

## 2025-04-04 ENCOUNTER — HOSPITAL ENCOUNTER (OUTPATIENT)
Dept: RADIOLOGY | Facility: HOSPITAL | Age: 55
Discharge: HOME | End: 2025-04-04
Payer: COMMERCIAL

## 2025-04-04 DIAGNOSIS — I65.23 BILATERAL CAROTID ARTERY STENOSIS: ICD-10-CM

## 2025-04-04 DIAGNOSIS — R09.89 OTHER SPECIFIED SYMPTOMS AND SIGNS INVOLVING THE CIRCULATORY AND RESPIRATORY SYSTEMS: ICD-10-CM

## 2025-04-04 DIAGNOSIS — R91.8 LUNG NODULES: ICD-10-CM

## 2025-04-04 PROCEDURE — 93880 EXTRACRANIAL BILAT STUDY: CPT

## 2025-04-04 PROCEDURE — 93880 EXTRACRANIAL BILAT STUDY: CPT | Performed by: STUDENT IN AN ORGANIZED HEALTH CARE EDUCATION/TRAINING PROGRAM

## 2025-04-04 PROCEDURE — 71250 CT THORAX DX C-: CPT

## 2025-04-05 LAB
ALBUMIN/CREAT UR: 4 MG/G CREAT
BASOPHILS # BLD AUTO: 99 CELLS/UL (ref 0–200)
BASOPHILS NFR BLD AUTO: 1.3 %
CREAT UR-MCNC: 126 MG/DL (ref 20–320)
EOSINOPHIL # BLD AUTO: 243 CELLS/UL (ref 15–500)
EOSINOPHIL NFR BLD AUTO: 3.2 %
ERYTHROCYTE [DISTWIDTH] IN BLOOD BY AUTOMATED COUNT: 14.6 % (ref 11–15)
FERRITIN SERPL-MCNC: 70 NG/ML (ref 38–380)
FOLATE SERPL-MCNC: 9.8 NG/ML
HCT VFR BLD AUTO: 48 % (ref 38.5–50)
HGB BLD-MCNC: 15.3 G/DL (ref 13.2–17.1)
IRON SATN MFR SERPL: 18 % (CALC) (ref 20–48)
IRON SERPL-MCNC: 55 MCG/DL (ref 50–180)
LYMPHOCYTES # BLD AUTO: 2645 CELLS/UL (ref 850–3900)
LYMPHOCYTES NFR BLD AUTO: 34.8 %
MAGNESIUM SERPL-MCNC: 2.1 MG/DL (ref 1.5–2.5)
MCH RBC QN AUTO: 27.5 PG (ref 27–33)
MCHC RBC AUTO-ENTMCNC: 31.9 G/DL (ref 32–36)
MCV RBC AUTO: 86.2 FL (ref 80–100)
MICROALBUMIN UR-MCNC: 0.5 MG/DL
MONOCYTES # BLD AUTO: 547 CELLS/UL (ref 200–950)
MONOCYTES NFR BLD AUTO: 7.2 %
NEUTROPHILS # BLD AUTO: 4066 CELLS/UL (ref 1500–7800)
NEUTROPHILS NFR BLD AUTO: 53.5 %
PLATELET # BLD AUTO: 386 THOUSAND/UL (ref 140–400)
PMV BLD REES-ECKER: 10.3 FL (ref 7.5–12.5)
PSA SERPL-MCNC: 0.86 NG/ML
RBC # BLD AUTO: 5.57 MILLION/UL (ref 4.2–5.8)
TIBC SERPL-MCNC: 302 MCG/DL (CALC) (ref 250–425)
VIT B12 SERPL-MCNC: 385 PG/ML (ref 200–1100)
WBC # BLD AUTO: 7.6 THOUSAND/UL (ref 3.8–10.8)

## 2025-04-09 ENCOUNTER — TELEPHONE (OUTPATIENT)
Dept: PRIMARY CARE | Facility: CLINIC | Age: 55
End: 2025-04-09

## 2025-04-09 ENCOUNTER — APPOINTMENT (OUTPATIENT)
Dept: PRIMARY CARE | Facility: CLINIC | Age: 55
End: 2025-04-09
Payer: COMMERCIAL

## 2025-04-09 VITALS
SYSTOLIC BLOOD PRESSURE: 136 MMHG | HEIGHT: 74 IN | WEIGHT: 315 LBS | DIASTOLIC BLOOD PRESSURE: 88 MMHG | HEART RATE: 61 BPM | BODY MASS INDEX: 40.43 KG/M2

## 2025-04-09 DIAGNOSIS — R51.9 INTRACTABLE HEADACHE, UNSPECIFIED CHRONICITY PATTERN, UNSPECIFIED HEADACHE TYPE: ICD-10-CM

## 2025-04-09 DIAGNOSIS — I15.2 HYPERTENSION ASSOCIATED WITH DIABETES: Primary | ICD-10-CM

## 2025-04-09 DIAGNOSIS — E66.813 CLASS 3 SEVERE OBESITY DUE TO EXCESS CALORIES WITH SERIOUS COMORBIDITY AND BODY MASS INDEX (BMI) OF 40.0 TO 44.9 IN ADULT: ICD-10-CM

## 2025-04-09 DIAGNOSIS — M54.2 NECK PAIN: ICD-10-CM

## 2025-04-09 DIAGNOSIS — E66.01 CLASS 3 SEVERE OBESITY DUE TO EXCESS CALORIES WITH SERIOUS COMORBIDITY AND BODY MASS INDEX (BMI) OF 40.0 TO 44.9 IN ADULT: ICD-10-CM

## 2025-04-09 DIAGNOSIS — S22.080A COMPRESSION FRACTURE OF T12 VERTEBRA, INITIAL ENCOUNTER (MULTI): ICD-10-CM

## 2025-04-09 DIAGNOSIS — E11.59 HYPERTENSION ASSOCIATED WITH DIABETES: Primary | ICD-10-CM

## 2025-04-09 DIAGNOSIS — I65.23 BILATERAL CAROTID ARTERY STENOSIS: ICD-10-CM

## 2025-04-09 PROCEDURE — 3075F SYST BP GE 130 - 139MM HG: CPT | Performed by: NURSE PRACTITIONER

## 2025-04-09 PROCEDURE — 4010F ACE/ARB THERAPY RXD/TAKEN: CPT | Performed by: NURSE PRACTITIONER

## 2025-04-09 PROCEDURE — 3079F DIAST BP 80-89 MM HG: CPT | Performed by: NURSE PRACTITIONER

## 2025-04-09 PROCEDURE — 4004F PT TOBACCO SCREEN RCVD TLK: CPT | Performed by: NURSE PRACTITIONER

## 2025-04-09 PROCEDURE — 3008F BODY MASS INDEX DOCD: CPT | Performed by: NURSE PRACTITIONER

## 2025-04-09 PROCEDURE — 99214 OFFICE O/P EST MOD 30 MIN: CPT | Performed by: NURSE PRACTITIONER

## 2025-04-09 RX ORDER — LISINOPRIL 10 MG/1
10 TABLET ORAL DAILY
Qty: 90 TABLET | Refills: 3 | Status: SHIPPED | OUTPATIENT
Start: 2025-04-09

## 2025-04-09 ASSESSMENT — ENCOUNTER SYMPTOMS
SORE THROAT: 0
UNEXPECTED WEIGHT CHANGE: 0
DYSURIA: 0
NERVOUS/ANXIOUS: 0
NUMBNESS: 0
FREQUENCY: 0
HEADACHES: 1
ABDOMINAL DISTENTION: 0
MYALGIAS: 0
EYE PAIN: 0
SPEECH DIFFICULTY: 0
SHORTNESS OF BREATH: 0
FATIGUE: 0
PALPITATIONS: 0
COUGH: 0
SLEEP DISTURBANCE: 0
HEMATURIA: 0
SEIZURES: 0
DIFFICULTY URINATING: 0
FEVER: 0
CHILLS: 0
APNEA: 0
CONSTIPATION: 0
FLANK PAIN: 0
ARTHRALGIAS: 1
CONFUSION: 0
FACIAL ASYMMETRY: 0
CHOKING: 0
TROUBLE SWALLOWING: 0
BLOOD IN STOOL: 0
WHEEZING: 0
PHOTOPHOBIA: 0
DIARRHEA: 0
WEAKNESS: 0
CHEST TIGHTNESS: 0
DIZZINESS: 1
ABDOMINAL PAIN: 0
WOUND: 0
NECK PAIN: 1
NAUSEA: 0
VOMITING: 0
EYE REDNESS: 0
JOINT SWELLING: 0
BACK PAIN: 0

## 2025-04-09 NOTE — PROGRESS NOTES
"Subjective   Patient ID: Boy Davis Jr. is a 54 y.o. male who presents for Follow-up (FOLLOW UP AFTER TESTING COMPLETE).      HPI:  Presents today for FU TESTING. CAROTID VASCULAR DOPPLER >50% B/L STENOSIS.    C/O DIZZINESS X 5-6 MONTHS THAT REMAINS   modifying factors consists of APPT WITH CARDIO 4/14/25  associated symptoms consist of DAILY HEADACHE THAT HAS GOTTEN WORE OVER THE PAST FEW WEEKS. IT IS CONSTANT. VISION WILL GO BLURRY ON/OFF. NOTHING MAKES IT BETTER OR WORSE. PAIN TO BASE OF SKULL THAT WILL WRAP AROUND SIDE OF B/L HEAD. HIS GAIT IS BECOMING UNSTEADY.  CT HEAD DENIED BY INSURANCE. NO OTC MED HAVE HELPED     HTN- RUNNING 140-150S/80-90S AT HOME. WHEN RECHECKED IN OFFICE IT /88. INCREASE LISINOPRIL TO 10 MG DAILY FROM 5MG. MONITOR BP AT HOME. Appt with cardio on 4/14/25        LUNG NODULES- STABLE  Visit Vitals  /88   Pulse 61   Ht 1.88 m (6' 2\")   Wt 145 kg (320 lb)   BMI 41.09 kg/m²   Smoking Status Every Day   BSA 2.75 m²        Review of Systems   Constitutional:  Negative for chills, fatigue, fever and unexpected weight change.   HENT:  Negative for congestion, ear pain, sore throat and trouble swallowing.    Eyes:  Negative for photophobia, pain, redness and visual disturbance.   Respiratory:  Negative for apnea, cough, choking, chest tightness, shortness of breath and wheezing.    Cardiovascular:  Negative for chest pain, palpitations and leg swelling.   Gastrointestinal:  Negative for abdominal distention, abdominal pain, blood in stool, constipation, diarrhea, nausea and vomiting.   Genitourinary:  Negative for difficulty urinating, dysuria, flank pain, frequency, hematuria and urgency.   Musculoskeletal:  Positive for arthralgias and neck pain. Negative for back pain, gait problem, joint swelling and myalgias.   Skin:  Negative for rash and wound.   Neurological:  Positive for dizziness and headaches. Negative for seizures, syncope, facial asymmetry, speech difficulty, " weakness and numbness.   Psychiatric/Behavioral:  Negative for confusion, sleep disturbance and suicidal ideas. The patient is not nervous/anxious.        Objective   Component      Latest Ref Rng 4/4/2025   WHITE BLOOD CELL COUNT      3.8 - 10.8 Thousand/uL 7.6    RED BLOOD CELL COUNT      4.20 - 5.80 Million/uL 5.57    HEMOGLOBIN      13.2 - 17.1 g/dL 15.3    HEMATOCRIT      38.5 - 50.0 % 48.0    MCV      80.0 - 100.0 fL 86.2    MCH      27.0 - 33.0 pg 27.5    MCHC      32.0 - 36.0 g/dL 31.9 (L)    RDW      11.0 - 15.0 % 14.6    PLATELET COUNT      140 - 400 Thousand/uL 386    MPV      7.5 - 12.5 fL 10.3    ABSOLUTE NEUTROPHILS      1,500 - 7,800 cells/uL 4,066    ABSOLUTE LYMPHOCYTES      850 - 3,900 cells/uL 2,645    ABSOLUTE MONOCYTES      200 - 950 cells/uL 547    ABSOLUTE EOSINOPHILS      15 - 500 cells/uL 243    ABSOLUTE BASOPHILS      0 - 200 cells/uL 99    NEUTROPHILS      % 53.5    LYMPHOCYTES      % 34.8    MONOCYTES      % 7.2    EOSINOPHILS      % 3.2    BASOPHILS      % 1.3    IRON, TOTAL      50 - 180 mcg/dL 55    IRON BINDING CAPACITY      250 - 425 mcg/dL (calc) 302    % SATURATION      20 - 48 % (calc) 18 (L)    CREATININE, RANDOM URINE      20 - 320 mg/dL 126    ALBUMIN, URINE      See Note: mg/dL 0.5    ALBUMIN/CREATININE RATIO, RANDOM URINE      <30 mg/g creat 4    MAGNESIUM      1.5 - 2.5 mg/dL 2.1    VITAMIN B12      200 - 1,100 pg/mL 385    FERRITIN      38 - 380 ng/mL 70    FOLATE, SERUM      ng/mL 9.8    PSA, TOTAL      < OR = 4.00 ng/mL 0.86       Legend:  (L) Low    Study Result    Narrative & Impression   Interpreted By:  Brian Huerta,   STUDY:  CT CHEST WO IV CONTRAST;  4/4/2025 1:11 pm      INDICATION:  Lung nodule      COMPARISON:  04/19/2022      ACCESSION NUMBER(S):  [id number]      ORDERING CLINICIAN:  ERIC BARNARD      TECHNIQUE:  Helical data acquisition of the chest was obtained  without IV  contrast material.  Images were reformatted in axial, coronal, and  sagittal planes.       FINDINGS:  LUNGS AND AIRWAYS:  No dense consolidation, mass or central airway obstruction. Clustered  coarsely calcified nodular densities in the right upper lobe are  again noted and are benign. Borderline generalized airway thickening  in the absence of any significant bronchiectasis. Stable tiny benign  nodular density without definite calcification at the left base on  image 246. Stable benign 5-6 mm noncalcified nodule in the right  lower lobe as annotated on the exam. No new, enlarging or worrisome  pulmonary nodule      MEDIASTINUM AND ELSA, LOWER NECK AND AXILLA:  No intrathoracic lymphadenopathy. The esophagus is not substantially  dilated. No masses are identified in the lower neck      HEART AND VESSELS:  Normal heart size. No pericardial effusion. Normal caliber thoracic  aorta and pulmonary trunk. Mild atherosclerotic calcifications. A  small amount of coarse calcification is present at the level of the  aortic valve leaflets, coronal series 6, image 75. Mild coronary  artery calcifications.      UPPER ABDOMEN:  Prior cholecystectomy. No acute upper abdominal finding is identified  on this noncontrast exam. Numerous small low-attenuation hepatic  lesions again present, presumably cysts and/or biliary hamartomas      CHEST WALL AND OSSEOUS STRUCTURES:  No significant soft tissue findings. No lytic or blastic osseous  lesion is identified. New inferior endplate compression deformity at  the T12 vertebral body level as compared to the prior examination,  series 7, image 77. Stable mild T9 and T12 vertebral body superior  endplate compression deformities.      IMPRESSION:  1.  Stable benign calcified and noncalcified small nodules.  2. Aortic valve leaflet calcification noted. Advise correlation with  the recent echocardiogram to exclude any associated calcific aortic  valvular stenosis.  3. New T12 vertebral body inferior endplate compression deformity as  compared to the prior chest CT, also new as  compared to prior lumbar  spine MRI 06/21/2023. Dedicated imaging may be performed as  clinically appropriate.         Physical Exam  Constitutional:       Appearance: Normal appearance. He is normal weight.   HENT:      Head: Normocephalic.   Eyes:      Extraocular Movements: Extraocular movements intact.      Conjunctiva/sclera: Conjunctivae normal.      Pupils: Pupils are equal, round, and reactive to light.   Cardiovascular:      Rate and Rhythm: Normal rate and regular rhythm.      Pulses: Normal pulses.      Heart sounds: Normal heart sounds.   Pulmonary:      Effort: Pulmonary effort is normal.      Breath sounds: Normal breath sounds.   Musculoskeletal:         General: Normal range of motion.      Cervical back: Normal range of motion.      Comments: BASE OF HEAD TENDERNESS. PAIN WORSE WITH ROM   Skin:     General: Skin is warm and dry.   Neurological:      General: No focal deficit present.      Mental Status: He is alert and oriented to person, place, and time.      Cranial Nerves: No cranial nerve deficit.      Sensory: No sensory deficit.      Motor: No weakness.      Coordination: Coordination normal.      Gait: Gait abnormal (UNSTEADY GAIT).      Deep Tendon Reflexes: Reflexes normal.   Psychiatric:         Mood and Affect: Mood normal.         Behavior: Behavior normal.         Thought Content: Thought content normal.         Judgment: Judgment normal.            Assessment/Plan   Problem List Items Addressed This Visit       Hypertension associated with diabetes - Primary    Relevant Medications    lisinopril 10 mg tablet    Class 3 severe obesity due to excess calories with serious comorbidity and body mass index (BMI) of 40.0 to 44.9 in adult    Bilateral carotid artery stenosis    Compression fracture of T12 vertebra (Multi)    Relevant Orders    XR thoracic spine 3 views    MR cervical spine wo IV contrast    MR thoracic spine wo IV contrast     Other Visit Diagnoses       Neck pain         Relevant Orders    XR cervical spine 2-3 views    MR cervical spine wo IV contrast    Intractable headache, unspecified chronicity pattern, unspecified headache type             CT HEAD DENIED BY INSURANCE. I WILL APPEAL THE CT    NEW COMPRESSION FX TO T12. SEEN DR. DE LA CRUZ, NEUROSURGERY IN THE PAST. I WILL ORDER MRI T SPINE AT THIS TIME.   I WILL ORDER MRI C  AND T SPINE R/T IS SYMPTOMS AND HIS H/O 2 COMPRESSION FRACTURES TO HIS T SPINE.   GO TO ER IF SYMPTOMS PERSIST OR WORSEN    WE DISCUSSED MOST COMMON SIDE EFFECTS OF PRESCRIBED MEDICATIONS. INDICATIONS, RISK, COMPLICATIONS, AND ALTERNATIVES OF MEDICATION/THERAPEUTICS WERE EXPLAINED AND DISCUSSED. PLEASE MONITOR CLOSELY FOR ANY UNTOWARD SIDE EFFECTS OR COMPLICATIONS OF MEDICATIONS. PATIENT IS STRONGLY ADVISED TO BE COMPLIANT WITH RECOMMENDATIONS. QUESTIONS AND CONCERNS WERE ADDRESSED. INSTRUCTED TO CALL, RETURN SOONER, OR GO TO THE ER,  IF SYMPTOMS PERSIST OR WORSEN. THEY VOICED UNDERSTANDING AND  DENIES FURTHER QUESTIONS AT THIS TIME.    TIME CODE  1. PREPARATION FOR PATIENT'S VISIT (REVIEWING CHART, CURRENT MEDICAL RECORDS, OUTSIDE HEALTH PROVIDER RECORDS, PREVIOUS HISTORY, EXAM, TEST, PROCEDURE, AND MEDICATIONS)  2. FACE TO FACE ENCOUNTER OBTAINING HISTORY FROM THE PATIENT/FAMILY/CAREGIVERS; PERFORMING EVALUATION AND EXAMINATION; ORDERING TESTS OR PROCEDURES; REFERRING AND COMMUNICATING WITH OTHER HEALTHCARE PROVIDERS; COUNSELING AND EDUCATION OF THE PATIENT/FAMILY/CAREGIVERS; INDEPENDENTLY INTERPRETING RESULTS (TESTS, LABS, PROCEDURES, IMAGING) AND COMMUNICATING AND EXPLAINING RESULTS TO THE PATIENT/FAMILY/CAREGIVERS  3. COORDINATION OF CARE; PREPARING AND PRINTING DISCHARGE INSTRUCTIONS AND ANY EDUCATIONAL MATERIAL FOR THE PATIENT/FAMILY/CAREGIVERS. DOCUMENTING CLINICAL INFORMATION IN THE ELECTRONIC MEDICAL RECORD   4. REVIEWING OARRS AS NEEDED  MDM  1) COMPLEXITY: MORE THAN 1 STABLE CHRONIC CONDITION ADDRESSED OR 1 ACUTE ILLNESS ADDRESSED   2)DATA: TESTS  INTERPRETED AND OR ORDERED, TOOK INDEPENDENT HISTORY OR RECORDS REVIEWED  3)RISK: MODERATE RISK DUE TO NATURE OF MEDICAL CONDITIONS/COMORBIDITY OR MEDICATIONS ORDERED OR SURGICAL OR PROCEDURE REFERRAL    2 WEEKS

## 2025-04-09 NOTE — LETTER
April 9, 2025     Patient: Boy Davis .   YOB: 1970   Date of Visit: 4/9/2025       To Whom It May Concern:    oBy Davis was seen in my clinic on 4/9/2025. Please excuse Boy for his absence from work on 4/8/25 and 4/9/25. If you have any questions or concerns, please don't hesitate to call.         Sincerely,         Nat Bryant, LUCY-CNP

## 2025-04-09 NOTE — TELEPHONE ENCOUNTER
PLEASE PRECERT MRI'S SCHEDULED 4/18/25. PATIENT WILL HAVE XRAYS DONE TOMORROW - WAIT TO START PRECERT UNTIL XRAY RESULTS ARE BACK.

## 2025-04-11 ENCOUNTER — HOSPITAL ENCOUNTER (OUTPATIENT)
Dept: RADIOLOGY | Facility: HOSPITAL | Age: 55
Discharge: HOME | End: 2025-04-11
Payer: COMMERCIAL

## 2025-04-11 DIAGNOSIS — M54.2 NECK PAIN: ICD-10-CM

## 2025-04-11 DIAGNOSIS — S22.080A COMPRESSION FRACTURE OF T12 VERTEBRA, INITIAL ENCOUNTER (MULTI): ICD-10-CM

## 2025-04-11 PROCEDURE — 72040 X-RAY EXAM NECK SPINE 2-3 VW: CPT

## 2025-04-11 PROCEDURE — 72072 X-RAY EXAM THORAC SPINE 3VWS: CPT

## 2025-04-14 ENCOUNTER — APPOINTMENT (OUTPATIENT)
Dept: CARDIOLOGY | Facility: CLINIC | Age: 55
End: 2025-04-14
Payer: COMMERCIAL

## 2025-04-14 VITALS
HEART RATE: 58 BPM | HEIGHT: 74 IN | DIASTOLIC BLOOD PRESSURE: 98 MMHG | WEIGHT: 315 LBS | OXYGEN SATURATION: 99 % | BODY MASS INDEX: 40.43 KG/M2 | SYSTOLIC BLOOD PRESSURE: 142 MMHG

## 2025-04-14 DIAGNOSIS — I15.2 HYPERTENSION ASSOCIATED WITH DIABETES: ICD-10-CM

## 2025-04-14 DIAGNOSIS — R42 DIZZINESS: ICD-10-CM

## 2025-04-14 DIAGNOSIS — R00.2 PALPITATIONS: ICD-10-CM

## 2025-04-14 DIAGNOSIS — E66.01 MORBID OBESITY (MULTI): ICD-10-CM

## 2025-04-14 DIAGNOSIS — I70.90 ATHEROSCLEROSIS: Primary | ICD-10-CM

## 2025-04-14 DIAGNOSIS — E11.59 HYPERTENSION ASSOCIATED WITH DIABETES: ICD-10-CM

## 2025-04-14 PROCEDURE — 99214 OFFICE O/P EST MOD 30 MIN: CPT | Performed by: STUDENT IN AN ORGANIZED HEALTH CARE EDUCATION/TRAINING PROGRAM

## 2025-04-14 PROCEDURE — 4010F ACE/ARB THERAPY RXD/TAKEN: CPT | Performed by: STUDENT IN AN ORGANIZED HEALTH CARE EDUCATION/TRAINING PROGRAM

## 2025-04-14 PROCEDURE — 93000 ELECTROCARDIOGRAM COMPLETE: CPT | Performed by: STUDENT IN AN ORGANIZED HEALTH CARE EDUCATION/TRAINING PROGRAM

## 2025-04-14 PROCEDURE — 3008F BODY MASS INDEX DOCD: CPT | Performed by: STUDENT IN AN ORGANIZED HEALTH CARE EDUCATION/TRAINING PROGRAM

## 2025-04-14 PROCEDURE — 3077F SYST BP >= 140 MM HG: CPT | Performed by: STUDENT IN AN ORGANIZED HEALTH CARE EDUCATION/TRAINING PROGRAM

## 2025-04-14 PROCEDURE — 3080F DIAST BP >= 90 MM HG: CPT | Performed by: STUDENT IN AN ORGANIZED HEALTH CARE EDUCATION/TRAINING PROGRAM

## 2025-04-14 PROCEDURE — 4004F PT TOBACCO SCREEN RCVD TLK: CPT | Performed by: STUDENT IN AN ORGANIZED HEALTH CARE EDUCATION/TRAINING PROGRAM

## 2025-04-14 NOTE — PROGRESS NOTES
No chief complaint on file.    HPI:  I was requested by Dr. Bryant to evaluate this patient in consultation for cardiac assessment.    Patient 54-year-old current smoker male with prior medical history significant for hypertension, hyperlipidemia, morbid obesity, smoking and abnormal stress test in 9/2020 followed by normal left heart catheterization. Currently the patient was complaining of some chest discomfort and shortness of breath that have been happening for the past 3 months. He notes that the chest discomfort is somewhat random and last for hours at a time. The shortness of breath is principally with exertion and can get worse. He also notes some orthopnea. Denies any PND or lower extremity edema. He underwent Left Heart Catheterization on April/2023 through R radial artery that showed non-obstructive coronary artery disease.   EKG showing normal sinus rhythm and no abnormalities.  Echo showing normal LVEF 65% with no wall motion abnormalities. Mild asymmetric left ventricular hypertrophy  US doppler carotids showing Heterogenous non-obstructive plaque R carotid artery  LHC (04/2023) showing normal coronaries     Patient returned for last appointment feeling good. He had lost 20 lb after the LHC. Total weight lost of ~100 lb (430 --> 329). He has improved his diet and is taking Mounjaro. He is currently asymptomatic.      He presented to ED [facility] on 05/21/2024 complaining of palpitations associated with dizziness and felt like he was going to faint. He denied chest pain, shortness of breath, leg edema, fever, chills, orthopnea, paroxysmal nocturnal dyspnea or syncope.     7-day holter monitor:  *The predominant rhythm was sinus.  *The Maximum Heart Rate recorded was 137 bpm, 05/25 12:41:40, the Minimum Heart Rate recorded was 48 bpm, 05/28 02:09:15, and the Average Heart Rate was 75 bpm.  *There were 11 VE beats with a burden of <1 %.  *There were 229 SVE beats with a burden of <1 %. There were 6  occurrences of Supraventricular Tachycardia with the Fastest episode 129 bpm, 05/23 01:28:58, and the Longest episode 4 beats, 05/23 01:28:51.  *There were 0 Patient Triggers.  *No signs of atrial fibrillation, atrial flutter, sinus pauses or malignant arrhythmias     Left Heart Cath (04/2023) showed normal coronaries     He had previously lost a total of 120 pounds over the last year, coming down from 450 pounds to 329 pounds.  He notes feeling much more energetic and even had to reduce his blood pressure medications now that he lost weight.     Patient returns today stating that he is doing well. He did not undergo his hip surgery due to COVID infection, needs new clearance. Still complaining of some palpitations. He is compliant with his medications.    Past Medical History  Past Medical History:   Diagnosis Date    Encounter for screening for malignant neoplasm of prostate 12/04/2020    Screening PSA (prostate specific antigen)    Immunization not carried out because of patient refusal     Influenza vaccination declined       Past Surgical History  Past Surgical History:   Procedure Laterality Date    APPENDECTOMY      CARDIAC CATHETERIZATION  09/23/2020    CARDIAC CATHETERIZATION  04/2023    CHOLECYSTECTOMY      COLONOSCOPY  07/2014    DR. GONZALEZ - REPEAT 3 YEARS. H/O POLYP, PRE-CANCER CELLS    TONSILLECTOMY         Past Family History  Family History   Problem Relation Name Age of Onset    Breast cancer Mother      No Known Problems Father      Coronary artery disease Other GRANDPARENT     Hyperlipidemia Other GRANDPARENT     Hypertension Other GRANDPARENT     Heart attack Other GRANDPARENT        Allergy History  Allergies   Allergen Reactions    Amlodipine Swelling    Metformin Diarrhea       Past Social History  Social History     Socioeconomic History    Marital status:    Tobacco Use    Smoking status: Every Day     Current packs/day: 0.50     Types: Cigarettes    Smokeless tobacco: Never    Vaping Use    Vaping status: Never Used   Substance and Sexual Activity    Alcohol use: Not Currently    Drug use: Never       Social History     Tobacco Use   Smoking Status Every Day    Current packs/day: 0.50    Types: Cigarettes   Smokeless Tobacco Never     Objective Data:  Last Recorded Vitals:  There were no vitals filed for this visit.    Last Labs:  CBC - 4/4/2025: 12:38 PM  7.6 15.3 386    48.0      CMP - 3/1/2025:  9:03 AM  9.1 6.2 12 --- 0.4   _ 4.0 12 49      PTT - 5/21/2024:  2:35 PM  1.0   11.7 30     TROPHS   Date/Time Value Ref Range Status   05/21/2024 02:35 PM 4 0 - 20 ng/L Final   04/12/2023 01:14 PM 8 0 - 20 ng/L Final     Comment:     .  Less than 99th percentile of normal range cutoff-  Female and children under 18 years old <14 ng/L; Male <21 ng/L: Negative  Repeat testing should be performed if clinically indicated.   .  Female and children under 18 years old 14-50 ng/L; Male 21-50 ng/L:  Consistent with possible cardiac damage and possible increased clinical   risk. Serial measurements may help to assess extent of myocardial damage.   .  >50 ng/L: Consistent with cardiac damage, increased clinical risk and  myocardial infarction. Serial measurements may help assess extent of   myocardial damage.   .   NOTE: Children less than 1 year old may have higher baseline troponin   levels and results should be interpreted in conjunction with the overall   clinical context.   .  NOTE: Troponin I testing is performed using a different   testing methodology at [facility] than at other   [facility] hospitals. Direct result comparisons should only   be made within the same method.     04/12/2023 11:03 AM 8 0 - 20 ng/L Final     Comment:     .  Less than 99th percentile of normal range cutoff-  Female and children under 18 years old <14 ng/L; Male <21 ng/L: Negative  Repeat testing should be performed if clinically indicated.   .  Female and children under 18 years old 14-50 ng/L; Male 21-50  ng/L:  Consistent with possible cardiac damage and possible increased clinical   risk. Serial measurements may help to assess extent of myocardial damage.   .  >50 ng/L: Consistent with cardiac damage, increased clinical risk and  myocardial infarction. Serial measurements may help assess extent of   myocardial damage.   .   NOTE: Children less than 1 year old may have higher baseline troponin   levels and results should be interpreted in conjunction with the overall   clinical context.   .  NOTE: Troponin I testing is performed using a different   testing methodology at [facility] than at other   system hospitals. Direct result comparisons should only   be made within the same method.     04/12/2023 09:02 AM 9 0 - 20 ng/L Final     Comment:     .  Less than 99th percentile of normal range cutoff-  Female and children under 18 years old <14 ng/L; Male <21 ng/L: Negative  Repeat testing should be performed if clinically indicated.   .  Female and children under 18 years old 14-50 ng/L; Male 21-50 ng/L:  Consistent with possible cardiac damage and possible increased clinical   risk. Serial measurements may help to assess extent of myocardial damage.   .  >50 ng/L: Consistent with cardiac damage, increased clinical risk and  myocardial infarction. Serial measurements may help assess extent of   myocardial damage.   .   NOTE: Children less than 1 year old may have higher baseline troponin   levels and results should be interpreted in conjunction with the overall   clinical context.   .  NOTE: Troponin I testing is performed using a different   testing methodology at [facility] than at other   [facility]. Direct result comparisons should only   be made within the same method.       BNP   Date/Time Value Ref Range Status   05/21/2024 02:35 PM 64 0 - 99 pg/mL Final     HGBA1C   Date/Time Value Ref Range Status   03/01/2025 09:03 AM 5.7 <5.7 % of total Hgb Final     Comment:     For someone  without known diabetes, a hemoglobin   A1c value between 5.7% and 6.4% is consistent with  prediabetes and should be confirmed with a   follow-up test.     For someone with known diabetes, a value <7%  indicates that their diabetes is well controlled. A1c  targets should be individualized based on duration of  diabetes, age, comorbid conditions, and other  considerations.     This assay result is consistent with an increased risk  of diabetes.     Currently, no consensus exists regarding use of  hemoglobin A1c for diagnosis of diabetes for children.        08/12/2024 07:02 AM 5.2 see below % Final   02/06/2024 06:53 AM 5.6 see below % Final     LDLCALC   Date/Time Value Ref Range Status   03/01/2025 09:03 AM 62 mg/dL (calc) Final     Comment:     Reference range: <100     Desirable range <100 mg/dL for primary prevention;    <70 mg/dL for patients with CHD or diabetic patients   with > or = 2 CHD risk factors.     LDL-C is now calculated using the Bernardino   calculation, which is a validated novel method providing   better accuracy than the Friedewald equation in the   estimation of LDL-C.   Aman FRY et al. SANCHEZ. 2013;310(19): 0212-7029   (http://education.Datezr/faq/ZEX076)     02/06/2024 06:53 AM 66 <=99 mg/dL Final     Comment:                                 Near   Borderline      AGE      Desirable  Optimal    High     High     Very High     0-19 Y     0 - 109     ---    110-129   >/= 130     ----    20-24 Y     0 - 119     ---    120-159   >/= 160     ----      >24 Y     0 -  99   100-129  130-159   160-189     >/=190       VLDL   Date/Time Value Ref Range Status   02/06/2024 06:53 AM 24 0 - 40 mg/dL Final   01/07/2023 08:59 AM 17 0 - 40 mg/dL Final   10/16/2021 08:47 AM 34 0 - 40 mg/dL Final   07/27/2020 03:12 PM 40 0 - 40 mg/dL Final        Patient Medications:  Outpatient Encounter Medications as of 4/14/2025   Medication Sig Dispense Refill    aspirin 81 mg EC tablet Take 1 tablet (81  mg) by mouth once daily. 90 tablet 3    atorvastatin (Lipitor) 40 mg tablet Take 1 tablet (40 mg) by mouth once daily at bedtime. 90 tablet 3    fluticasone-umeclidin-vilanter (TRELEGY-ELLIPTA) 100-62.5-25 mcg blister with device Inhale 1 puff once daily. 3 each 3    ipratropium-albuteroL (Duo-Neb) 0.5-2.5 mg/3 mL nebulizer solution Take 3 mL by nebulization 4 times a day.      lisinopril 10 mg tablet Take 1 tablet (10 mg) by mouth once daily. 90 tablet 3    loratadine (Claritin) 10 mg tablet Take 1 tablet (10 mg) by mouth once daily. 90 tablet 3    metoprolol succinate XL (Toprol XL) 100 mg 24 hr tablet Take 1 tablet (100 mg) by mouth once daily. Do not crush or chew. 90 tablet 3    omeprazole (PriLOSEC) 40 mg DR capsule Take 1 capsule (40 mg) by mouth once daily. 90 capsule 3    predniSONE (Deltasone) 10 mg tablet Take 3 tablets (30 mg) by mouth once daily. 15 tablet 0    tirzepatide (Mounjaro) 2.5 mg/0.5 mL pen injector Inject 2.5 mg under the skin 1 (one) time per week. 2 mL 11    [DISCONTINUED] lisinopril 5 mg tablet Take 1 tablet (5 mg) by mouth once daily. 90 tablet 3     No facility-administered encounter medications on file as of 4/14/2025.       Physical Exam:  General: alert, oriented and in no acute distress. Obesity  HEENT: NC/AT; EOMI; PERRLA, external ear is normal  Neck: supple; trachea midline; no masses; no JVD  Chest: clear breath sounds bilaterally; no wheezing  Cardio: regular rhythm, S1S2 normal, no murmurs  Abdomen: Soft, non-tender, non-distension, no organomegaly  Extremities: no clubbing/cyanosis/edema  Neuro: Grossly intact     Psychiatric: Normal mood and affect     Past Cardiology Results (Last 3 Years):  EKG:  ECG 12 lead (Clinic Performed) 05/31/2024      ECG 12 lead 05/21/2024    Echo:  Echo Results:  No results found for this or any previous visit from the past 365 days.     Cath:  No results found for this or any previous visit from the past 1095 days.    CV NCDR CATHPCI V5  COLLECTION FORM   Stress Test:  No results found for this or any previous visit from the past 1095 days.    Cardiac Imaging:  No results found for this or any previous visit from the past 1095 days.    I have personally reviewed the test results and my impressions are:  Left Heart Catheterization on April/2023 through R radial artery that showed non-obstructive coronary artery disease.   EKG showing normal sinus rhythm and no abnormalities.  Echo showing normal LVEF 65% with no wall motion abnormalities. Mild asymmetric left ventricular hypertrophy  Heterogenous non-obstructive plaque R carotid artery  C (04/2023) showing normal coronaries      Assessment/Plan     In summary, Mr. Davis is a 54-year-old current everyday smoker male with prior medical history significant for hypertension, hyperlipidemia, obesity, smoking and abnormal stress test in 9/2020 followed by normal left heart catheterization. Currently the patient was complaining of some chest discomfort and shortness of breath that have been happening for the past 3 months. He underwent Left Heart Catheterization on April/2023 through R radial artery that showed non-obstructive coronary artery disease.      # Cardiology consulted to determine pre-operative risk for hip surgery     He did not undergo his hip surgery due to COVID infection, needs new clearance.   Patient currently able to perform >4 METs without significant cardiac limitations.   ECG today with no signs of ischemia/infarct.   Patient denies any history of reaction to anesthesia in the past.  Based on the Revised Cardiac Risk Index - RCRI, the 30-day risk for death, myocardial infarction or cardiac arrest is 6%. Patient can undergo the procedure with low risk of cardiovascular complications. Ideally patient should remain on ASA, but if the bleeding risk is excessive based on the evaluation of the surgical team, ASA should be discontinued 5-7 days prior to procedure and resumed 24 hours after  the procedure. Would suggest to keep taking all the other medications up to the day of the surgery.     # Dizziness / Palpitations  - He presented to ED [facility] on 05/21/2024 complaining of palpitations associated with dizziness and felt like he was going to faint. He denied chest pain, shortness of breath, leg edema, fever, chills, orthopnea, paroxysmal nocturnal dyspnea or syncope.   - 7-day holter monitor:  *The predominant rhythm was sinus.  *The Maximum Heart Rate recorded was 137 bpm, 05/25 12:41:40, the Minimum Heart Rate recorded was 48 bpm, 05/28 02:09:15, and the Average Heart Rate was 75 bpm.  *There were 11 VE beats with a burden of <1 %.  *There were 229 SVE beats with a burden of <1 %. There were 6 occurrences of Supraventricular Tachycardia with the Fastest episode 129 bpm, 05/23 01:28:58, and the Longest episode 4 beats, 05/23 01:28:51.  *There were 0 Patient Triggers.  *No signs of atrial fibrillation, atrial flutter, sinus pauses or malignant arrhythmias  - US duplex carotids (04/2025) with <50% plaques bilaterally.  - CT Chest showing calcification in aortic valve.  - Patient was reassured.  - Notably, he lost a total of 120 pounds over the last year, coming down from 450 pounds to 329 pounds.  He notes feeling much more energetic and even had to reduce his blood pressure medications now that he lost weight.  - Keep Lisinopril 2.5mg daily and Metoprolol succinate 100mg daily.   - Follow up yearly.     # Atherosclerosis / S/P PCI to OM1 / Heterogenous non-obstructive plaque R carotid artery  - US carotids showig <50% plaque R carotid and no plaque in L carotid.   - He underwent LHC on April/2023 through R radial artery that showed non-obstructive coronary artery disease.   - Good healing in the R radial access site. Good radial pulse.  - No need for further coronary assessment at this point.  - Keep ASA 81mg daily and Atorvastatin 40mg daily.  -We had a thorough conversation regarding good  habits, good diet and avoiding junk food. Advised for exercises. The patient also states that he has lost weight from 430 to 329 pounds during this year and he will keep it up. He is taking Mounjaro.     # Hypertension  - BP controlled  - Echo showing normal LVEF 65% with mild asymmetric left ventricular hypertrophy  - Keep home medication     # T2DM  - Keep Mounjaro, diabetic diet  - Counseling     # Hyperlipidemia  - Keep Atorvastatin 40mg daily.     # Smoking  - We had a thorough conversation with the patient (~3min) addressing the hazard risks for smoking, including but not limited to heart attack, stroke and cancer. The patient states to understand the risks and is contemplating on quitting smoking.      We have discussed the most common side effects of the prescribed medications, indications, drug interactions, risks, complications, and alternatives of medications/therapeutics were explained and discussed. The patient has been requested to monitor closely for any untoward side effects or complications of medications. The patient has been strongly advised to be compliant with the recommendations, all the questions and concerns have been addressed. The patient has been also instructed to call, to return sooner or to go to the emergency department if symptoms persist or get worsen. The patient voiced understanding and denies any further questions at this time.     This note was transcribed using the Dragon Dictation system. There may be grammatical, punctuation, or verbiage errors that occur with voice recognition programs.     Counseling greater than 50% of visit regarding all cardiac issues.     Thank you, Dr. Bryant, for allowing me to participate in the care of this patient. Please reach me out if you have any questions or if you need any clarifications regarding the patient's care.     Colby Higginbotham MD  Cardiology

## 2025-04-18 ENCOUNTER — HOSPITAL ENCOUNTER (OUTPATIENT)
Dept: RADIOLOGY | Facility: HOSPITAL | Age: 55
Discharge: HOME | End: 2025-04-18
Payer: COMMERCIAL

## 2025-04-18 DIAGNOSIS — S22.080A COMPRESSION FRACTURE OF T12 VERTEBRA, INITIAL ENCOUNTER (MULTI): ICD-10-CM

## 2025-04-18 DIAGNOSIS — M54.2 NECK PAIN: ICD-10-CM

## 2025-04-18 PROCEDURE — 72146 MRI CHEST SPINE W/O DYE: CPT

## 2025-04-18 PROCEDURE — 72141 MRI NECK SPINE W/O DYE: CPT

## 2025-04-22 ENCOUNTER — TELEPHONE (OUTPATIENT)
Dept: PRIMARY CARE | Facility: CLINIC | Age: 55
End: 2025-04-22
Payer: COMMERCIAL

## 2025-04-22 DIAGNOSIS — M51.9 SCHMORL'S NODE: Primary | ICD-10-CM

## 2025-04-22 NOTE — TELEPHONE ENCOUNTER
I called and discussed mri's with Boy. He is in agreement to see  neurosurgery. Referral is in.     Schedule September with labs and cancel 4/23/25 appt per patient request

## 2025-04-23 ENCOUNTER — APPOINTMENT (OUTPATIENT)
Dept: PRIMARY CARE | Facility: CLINIC | Age: 55
End: 2025-04-23
Payer: COMMERCIAL

## 2025-05-09 ENCOUNTER — OFFICE VISIT (OUTPATIENT)
Dept: ORTHOPEDIC SURGERY | Facility: CLINIC | Age: 55
End: 2025-05-09
Payer: COMMERCIAL

## 2025-05-09 VITALS — WEIGHT: 313 LBS | HEIGHT: 74 IN | BODY MASS INDEX: 40.17 KG/M2

## 2025-05-09 DIAGNOSIS — M51.9 SCHMORL'S NODE: ICD-10-CM

## 2025-05-09 DIAGNOSIS — M54.2 NECK PAIN: Primary | ICD-10-CM

## 2025-05-09 PROCEDURE — 99202 OFFICE O/P NEW SF 15 MIN: CPT | Performed by: ORTHOPAEDIC SURGERY

## 2025-05-09 NOTE — PROGRESS NOTES
Boy Davis Jr. is a 54 y.o. male who presents for New Patient Visit of the Neck (Gets really bad headaches and pain down his neck/X-rays and MRI done at ) and New Patient Visit of the Middle Back (Pain across his shoulder blades/X-rays and MRI done at ).    HPI:  54-year-old gentleman here for new patient evaluation of neck pain and headaches.  He denies any fever chills nausea vomiting night sweats.  He has no bowel or bladder complaints.    Physical exam:  Well-nourished, well kept.  No lymphangitis or lymphadenopathy in the examined extremities. Affect normal.  Alert and oriented X 3.  Coordination normal.  Patient can rise from a seated position, can sit from a standing position. Can stand on heels and toes with a lot of difficulty, ambulates with a cane.  Patient is minimally tender in the paraspinal musculature of the cervical spine. range of motion is mildly decreased secondary to some pain and stiffness no weakness no instability to muscle strength. examination of the upper extremities reveals no point tenderness, swelling, or deformity.  Range of motion of the shoulders, elbows, wrists, and fingers are full without crepitance, instability, or exacerbation of pain. Strength is 5/5 throughout. no redness, abrasions, or lesions on the upper extremities bilaterally.  Gross sensation intact to the extremities.  Deep tendon reflexes 1+ and symmetric bilaterally.  Younger negative.     Imaging studies:  X-rays of the cervical spine from April 11, 2025 were reviewed today.  An MRI of the cervical spine from April 18, 2025 was reviewed today.    Assessment:  54-year-old gentleman here for new patient evaluation of neck pain and headaches.  This has been going on for a couple of months.  Sent over by primary care nurse practitioner Nat Bryant.  This issue started a couple of months ago, he will get posterior occipital pain that will radiate up into his head and cause moderate to severe headaches that can last  all day.  Ibuprofen may help but does not give him any long-lasting relief.  He has no radicular symptoms in his trapezius muscles shoulders or arms or hands bilaterally.  He has x-rays of the cervical spine and an MRI of the cervical spine.  He has stenosis at C5-6 and C6-7.  No history of physical therapy or chiropractic care.  No history of neck surgery or injections.  He is ambulating with a cane and has difficulty walking, he is scheduled for hip replacements in the near future.    We have reviewed tests today, x-rays, MRI.  We reviewed the notes from his nurse practitioner primary care from April 9, 2025.  This is an undiagnosed new problem with uncertain prognosis that is affecting his bodily function.    For complete plan and/or surgical details, please refer to Dr. Pichardo's portion of this split dictation.    -Flash Cain PA-C    In a face-to-face encounter, I performed a history and physical examination, discussed pertinent diagnostic studies if indicated, and discussed diagnosis and management strategies with both the patient and the midlevel provider.  I reviewed the midlevel's note and agree with the documented findings and plan of care.    Patient with headaches.  Starts in the base of the skull and goes over the top of his head.  He was sent here by primary care.  Reviewed those notes from April 9, 2025.  I explained to the patient that headaches are usually not coming from the cervical spine.  There is nothing on the MRI or x-rays that I think would surgically help headaches.  Additionally, if we ever were to do a spine surgery on him he would need to lose 50 pounds to have a BMI closer to 35 and not be on any nicotine products.  I explained all this to the patient.  He does have a new acute problem of uncertain prognosis with his headaches.  We did review x-rays.  We did review an MRI of his thoracic spine and we reviewed an MRI of his cervical spine.  He can follow-up with me on a as  needed basis.    Vlad Pichardo MD  Orthopedic surgery

## 2025-05-27 ENCOUNTER — HOSPITAL ENCOUNTER (EMERGENCY)
Facility: HOSPITAL | Age: 55
Discharge: HOME | End: 2025-05-27
Attending: EMERGENCY MEDICINE
Payer: COMMERCIAL

## 2025-05-27 ENCOUNTER — APPOINTMENT (OUTPATIENT)
Dept: CARDIOLOGY | Facility: HOSPITAL | Age: 55
End: 2025-05-27
Payer: COMMERCIAL

## 2025-05-27 ENCOUNTER — APPOINTMENT (OUTPATIENT)
Dept: RADIOLOGY | Facility: HOSPITAL | Age: 55
End: 2025-05-27
Payer: COMMERCIAL

## 2025-05-27 VITALS
HEART RATE: 65 BPM | OXYGEN SATURATION: 96 % | SYSTOLIC BLOOD PRESSURE: 171 MMHG | RESPIRATION RATE: 15 BRPM | TEMPERATURE: 97.7 F | HEIGHT: 74 IN | BODY MASS INDEX: 40.43 KG/M2 | WEIGHT: 315 LBS | DIASTOLIC BLOOD PRESSURE: 96 MMHG

## 2025-05-27 DIAGNOSIS — I10 HYPERTENSION, UNSPECIFIED TYPE: ICD-10-CM

## 2025-05-27 DIAGNOSIS — G44.59 OTHER COMPLICATED HEADACHE SYNDROME: ICD-10-CM

## 2025-05-27 DIAGNOSIS — R42 DIZZINESS: Primary | ICD-10-CM

## 2025-05-27 LAB
ANION GAP SERPL CALC-SCNC: 12 MMOL/L (ref 10–20)
BASOPHILS # BLD AUTO: 0.08 X10*3/UL (ref 0–0.1)
BASOPHILS NFR BLD AUTO: 0.9 %
BNP SERPL-MCNC: 116 PG/ML (ref 0–99)
BUN SERPL-MCNC: 14 MG/DL (ref 6–23)
CALCIUM SERPL-MCNC: 9 MG/DL (ref 8.6–10.3)
CARDIAC TROPONIN I PNL SERPL HS: 6 NG/L (ref 0–20)
CARDIAC TROPONIN I PNL SERPL HS: 7 NG/L (ref 0–20)
CHLORIDE SERPL-SCNC: 106 MMOL/L (ref 98–107)
CO2 SERPL-SCNC: 24 MMOL/L (ref 21–32)
CREAT SERPL-MCNC: 1.26 MG/DL (ref 0.5–1.3)
EGFRCR SERPLBLD CKD-EPI 2021: 67 ML/MIN/1.73M*2
EOSINOPHIL # BLD AUTO: 0.26 X10*3/UL (ref 0–0.7)
EOSINOPHIL NFR BLD AUTO: 2.9 %
ERYTHROCYTE [DISTWIDTH] IN BLOOD BY AUTOMATED COUNT: 15.6 % (ref 11.5–14.5)
GLUCOSE SERPL-MCNC: 85 MG/DL (ref 74–99)
HCT VFR BLD AUTO: 43.6 % (ref 41–52)
HGB BLD-MCNC: 14.2 G/DL (ref 13.5–17.5)
IMM GRANULOCYTES # BLD AUTO: 0.01 X10*3/UL (ref 0–0.7)
IMM GRANULOCYTES NFR BLD AUTO: 0.1 % (ref 0–0.9)
LYMPHOCYTES # BLD AUTO: 2.5 X10*3/UL (ref 1.2–4.8)
LYMPHOCYTES NFR BLD AUTO: 28.2 %
MAGNESIUM SERPL-MCNC: 1.97 MG/DL (ref 1.6–2.4)
MCH RBC QN AUTO: 27.2 PG (ref 26–34)
MCHC RBC AUTO-ENTMCNC: 32.6 G/DL (ref 32–36)
MCV RBC AUTO: 83 FL (ref 80–100)
MONOCYTES # BLD AUTO: 0.7 X10*3/UL (ref 0.1–1)
MONOCYTES NFR BLD AUTO: 7.9 %
NEUTROPHILS # BLD AUTO: 5.33 X10*3/UL (ref 1.2–7.7)
NEUTROPHILS NFR BLD AUTO: 60 %
NRBC BLD-RTO: 0 /100 WBCS (ref 0–0)
PLATELET # BLD AUTO: 286 X10*3/UL (ref 150–450)
POTASSIUM SERPL-SCNC: 3.9 MMOL/L (ref 3.5–5.3)
RBC # BLD AUTO: 5.23 X10*6/UL (ref 4.5–5.9)
SODIUM SERPL-SCNC: 138 MMOL/L (ref 136–145)
WBC # BLD AUTO: 8.9 X10*3/UL (ref 4.4–11.3)

## 2025-05-27 PROCEDURE — 70450 CT HEAD/BRAIN W/O DYE: CPT

## 2025-05-27 PROCEDURE — 99284 EMERGENCY DEPT VISIT MOD MDM: CPT | Mod: 25 | Performed by: EMERGENCY MEDICINE

## 2025-05-27 PROCEDURE — 36415 COLL VENOUS BLD VENIPUNCTURE: CPT | Performed by: EMERGENCY MEDICINE

## 2025-05-27 PROCEDURE — 85025 COMPLETE CBC W/AUTO DIFF WBC: CPT | Performed by: EMERGENCY MEDICINE

## 2025-05-27 PROCEDURE — 99285 EMERGENCY DEPT VISIT HI MDM: CPT | Mod: 25

## 2025-05-27 PROCEDURE — 83735 ASSAY OF MAGNESIUM: CPT | Performed by: EMERGENCY MEDICINE

## 2025-05-27 PROCEDURE — 84484 ASSAY OF TROPONIN QUANT: CPT | Performed by: EMERGENCY MEDICINE

## 2025-05-27 PROCEDURE — 83880 ASSAY OF NATRIURETIC PEPTIDE: CPT | Performed by: EMERGENCY MEDICINE

## 2025-05-27 PROCEDURE — 93005 ELECTROCARDIOGRAM TRACING: CPT

## 2025-05-27 PROCEDURE — 2500000001 HC RX 250 WO HCPCS SELF ADMINISTERED DRUGS (ALT 637 FOR MEDICARE OP): Performed by: EMERGENCY MEDICINE

## 2025-05-27 PROCEDURE — 2500000004 HC RX 250 GENERAL PHARMACY W/ HCPCS (ALT 636 FOR OP/ED): Mod: JZ | Performed by: EMERGENCY MEDICINE

## 2025-05-27 PROCEDURE — 96374 THER/PROPH/DIAG INJ IV PUSH: CPT

## 2025-05-27 PROCEDURE — 80048 BASIC METABOLIC PNL TOTAL CA: CPT | Performed by: EMERGENCY MEDICINE

## 2025-05-27 PROCEDURE — 70450 CT HEAD/BRAIN W/O DYE: CPT | Performed by: RADIOLOGY

## 2025-05-27 RX ORDER — HYDRALAZINE HYDROCHLORIDE 20 MG/ML
10 INJECTION INTRAMUSCULAR; INTRAVENOUS ONCE
Status: COMPLETED | OUTPATIENT
Start: 2025-05-27 | End: 2025-05-27

## 2025-05-27 RX ORDER — ACETAMINOPHEN 325 MG/1
650 TABLET ORAL ONCE
Status: COMPLETED | OUTPATIENT
Start: 2025-05-27 | End: 2025-05-27

## 2025-05-27 RX ORDER — IBUPROFEN 200 MG
800 TABLET ORAL AS NEEDED
COMMUNITY

## 2025-05-27 RX ADMIN — HYDRALAZINE HYDROCHLORIDE 10 MG: 20 INJECTION INTRAMUSCULAR; INTRAVENOUS at 18:52

## 2025-05-27 RX ADMIN — ACETAMINOPHEN 650 MG: 325 TABLET ORAL at 18:52

## 2025-05-27 ASSESSMENT — COLUMBIA-SUICIDE SEVERITY RATING SCALE - C-SSRS
2. HAVE YOU ACTUALLY HAD ANY THOUGHTS OF KILLING YOURSELF?: NO
6. HAVE YOU EVER DONE ANYTHING, STARTED TO DO ANYTHING, OR PREPARED TO DO ANYTHING TO END YOUR LIFE?: NO
1. IN THE PAST MONTH, HAVE YOU WISHED YOU WERE DEAD OR WISHED YOU COULD GO TO SLEEP AND NOT WAKE UP?: NO

## 2025-05-27 ASSESSMENT — PAIN SCALES - GENERAL: PAINLEVEL_OUTOF10: 6

## 2025-05-27 ASSESSMENT — PAIN - FUNCTIONAL ASSESSMENT: PAIN_FUNCTIONAL_ASSESSMENT: 0-10

## 2025-05-27 NOTE — ED PROVIDER NOTES
HPI   Chief Complaint   Patient presents with    Dizziness     Pt c/o dizziness since he got off work at 1500 this afternoon. States he is having some left hand tingling. Hx of HTN, took morning dose of meds, did not take evening.       Patient presents to the emergency department secondary to headache, blurred vision, dizziness, and tingling of his left hand.  Patient states his symptoms have been present for greater than 1 month.  He states that they have progressively gotten worse however.  The nursing notes were reviewed and this was noted to contribute directly to patient care.  Nursing notes state that his symptoms began today however once again the patient confirms to me that his symptoms have been present for greater than a month although have gotten progressively worse and today the only new component to his symptoms was disorientation and tingling of his left hand, both of which have improved/resolved.  Denies chest pain, or shortness of breath.  Patient's blood pressure is noted to be elevated at the time of arrival.  He has a history of hypertension and reports compliance with his medications      History provided by:  Patient   used: No            Patient History   Medical History[1]  Surgical History[2]  Family History[3]  Social History[4]    Physical Exam   ED Triage Vitals [05/27/25 1813]   Temperature Heart Rate Respirations BP   36.5 °C (97.7 °F) 65 18 (!) 167/136      Pulse Ox Temp src Heart Rate Source Patient Position   95 % -- -- --      BP Location FiO2 (%)     -- --       Physical Exam  Vitals and nursing note reviewed.   Constitutional:       General: He is not in acute distress.     Appearance: Normal appearance. He is obese. He is not ill-appearing, toxic-appearing or diaphoretic.   HENT:      Head: Normocephalic and atraumatic.      Nose: Nose normal. No rhinorrhea.   Neck:      Comments: Trachea is midline  Cardiovascular:      Rate and Rhythm: Normal rate and  regular rhythm.      Heart sounds: No murmur heard.  Pulmonary:      Effort: Pulmonary effort is normal.      Breath sounds: Normal breath sounds. No wheezing.   Abdominal:      General: Abdomen is flat. Bowel sounds are normal. There is no distension.      Palpations: Abdomen is soft.      Tenderness: There is no abdominal tenderness.   Musculoskeletal:         General: Normal range of motion.      Cervical back: Normal range of motion.   Skin:     General: Skin is warm and dry.      Findings: No rash.   Neurological:      General: No focal deficit present.      Mental Status: He is alert and oriented to person, place, and time. Mental status is at baseline.      Cranial Nerves: No cranial nerve deficit.      Sensory: No sensory deficit.      Motor: No weakness.      Coordination: Coordination normal.   Psychiatric:         Mood and Affect: Mood normal.         Behavior: Behavior normal.         Thought Content: Thought content normal.         Judgment: Judgment normal.           ED Course & MDM                  No data recorded     [place] Coma Scale Score: 15 (05/27/25 1812 : Elaine Avendano RN)       NIH Stroke Scale: 0 (05/27/25 1822 : Elaine Avendano RN)                   Medical Decision Making  Patient was not made a stroke alert at the time of arrival based on the timeline of his symptoms which have once again been present for greater than 1 month.  He also has an NIH stroke scale score of 0 at the time of arrival.    Patient was given IV hydralazine here given his elevated blood pressure    Twelve-lead EKG was interpreted by myself and this was noted to contribute directly to patient care.  Study reveals a normal sinus rhythm at 65 bpm, normal axis, delayed R wave progression, no acute ischemic changes    Laboratory and radiographic panels are pending.  Differential considerations would include, but not limited to, hypertensive urgency, hyperglycemia, amongst many others.    Patient was endorsed to the  oncoming provider.  Please see their note for interpretation of the pending studies and final disposition.        Procedure  Procedures       [1]   Past Medical History:  Diagnosis Date    Encounter for screening for malignant neoplasm of prostate 12/04/2020    Screening PSA (prostate specific antigen)    Immunization not carried out because of patient refusal     Influenza vaccination declined   [2]   Past Surgical History:  Procedure Laterality Date    APPENDECTOMY      CARDIAC CATHETERIZATION  09/23/2020    CARDIAC CATHETERIZATION  04/2023    CHOLECYSTECTOMY      COLONOSCOPY  07/2014    DR. GONZALEZ - REPEAT 3 YEARS. H/O POLYP, PRE-CANCER CELLS    TONSILLECTOMY     [3]   Family History  Problem Relation Name Age of Onset    Breast cancer Mother      No Known Problems Father      Coronary artery disease Other GRANDPARENT     Hyperlipidemia Other GRANDPARENT     Hypertension Other GRANDPARENT     Heart attack Other GRANDPARENT    [4]   Social History  Tobacco Use    Smoking status: Every Day     Current packs/day: 0.50     Types: Cigarettes    Smokeless tobacco: Never   Vaping Use    Vaping status: Never Used   Substance Use Topics    Alcohol use: Not Currently    Drug use: Never        Serafin Carcamo DO  05/27/25 1236

## 2025-05-28 LAB
ATRIAL RATE: 65 BPM
P AXIS: 53 DEGREES
P OFFSET: 178 MS
P ONSET: 125 MS
PR INTERVAL: 172 MS
Q ONSET: 211 MS
QRS COUNT: 10 BEATS
QRS DURATION: 92 MS
QT INTERVAL: 398 MS
QTC CALCULATION(BAZETT): 413 MS
QTC FREDERICIA: 408 MS
R AXIS: 17 DEGREES
T AXIS: 62 DEGREES
T OFFSET: 410 MS
VENTRICULAR RATE: 65 BPM

## 2025-05-28 NOTE — ED NOTES
Pt up to bathroom.  Complaining of normal hip pain, denies dizziness, but does complaint of headache.  Uses cane to ambulate to bathroom.  Stand by assist.       Miri Wills RN  05/27/25 2007

## 2025-05-28 NOTE — ED PROVIDER NOTES
Emergency Medicine Transition of Care Note.    I received Boy Davis Jr. in signout from Dr. Carcamo.  Please see the previous ED provider note for all HPI, PE and MDM up to the time of signout at 7 PM. This is in addition to the primary record.    In brief Boy Davis Jr. is an 54 y.o. male presenting for   Chief Complaint   Patient presents with    Dizziness     Pt c/o dizziness since he got off work at 1500 this afternoon. States he is having some left hand tingling. Hx of HTN, took morning dose of meds, did not take evening.     At the time of signout we were awaiting: Results of the CT head without contrast    Diagnoses as of 05/27/25 2025   Dizziness   Other complicated headache syndrome   Hypertension, unspecified type       Medical Decision Making  Patient was checked out to me having the results of the CT head without contrast.  There was no intracranial hemorrhage.  No obvious mass.  They did report findings concerning for otomastoiditis however clinically that does not correlate.  Patient is feeling better.  He can be discharged home.      CT head wo IV contrast   Final Result   No intracranial hemorrhage.        Near-complete opacification of the tympanic cavity and mastoid air   cells on the right. Recommend clinical correlation for signs and   symptoms of otomastoiditis. Nonspecific partial opacification of   mastoid air cells on the left as well.        MACRO:   None        Signed by: Flash Alvarenga 5/27/2025 8:14 PM   Dictation workstation:   [id number]           Final diagnoses:   [R42] Dizziness   [G44.59] Other complicated headache syndrome   [I10] Hypertension, unspecified type           Procedure  Procedures    MD Chuck Harris MD  05/27/25 2025

## 2025-06-03 ENCOUNTER — APPOINTMENT (OUTPATIENT)
Dept: PRIMARY CARE | Facility: CLINIC | Age: 55
End: 2025-06-03
Payer: COMMERCIAL

## 2025-06-03 VITALS
OXYGEN SATURATION: 95 % | WEIGHT: 310.8 LBS | HEART RATE: 66 BPM | SYSTOLIC BLOOD PRESSURE: 138 MMHG | DIASTOLIC BLOOD PRESSURE: 84 MMHG | HEIGHT: 74 IN | BODY MASS INDEX: 39.89 KG/M2

## 2025-06-03 DIAGNOSIS — H93.13 TINNITUS OF BOTH EARS: Primary | ICD-10-CM

## 2025-06-03 DIAGNOSIS — M79.604 LOW BACK PAIN RADIATING TO RIGHT LEG: ICD-10-CM

## 2025-06-03 DIAGNOSIS — J44.9 CHRONIC OBSTRUCTIVE PULMONARY DISEASE, UNSPECIFIED COPD TYPE (MULTI): ICD-10-CM

## 2025-06-03 DIAGNOSIS — M54.50 LOW BACK PAIN RADIATING TO RIGHT LEG: ICD-10-CM

## 2025-06-03 PROCEDURE — 3008F BODY MASS INDEX DOCD: CPT | Performed by: NURSE PRACTITIONER

## 2025-06-03 PROCEDURE — 4004F PT TOBACCO SCREEN RCVD TLK: CPT | Performed by: NURSE PRACTITIONER

## 2025-06-03 PROCEDURE — 3075F SYST BP GE 130 - 139MM HG: CPT | Performed by: NURSE PRACTITIONER

## 2025-06-03 PROCEDURE — 3079F DIAST BP 80-89 MM HG: CPT | Performed by: NURSE PRACTITIONER

## 2025-06-03 PROCEDURE — 4010F ACE/ARB THERAPY RXD/TAKEN: CPT | Performed by: NURSE PRACTITIONER

## 2025-06-03 PROCEDURE — 99214 OFFICE O/P EST MOD 30 MIN: CPT | Performed by: NURSE PRACTITIONER

## 2025-06-03 ASSESSMENT — ENCOUNTER SYMPTOMS
EYE PAIN: 0
CONFUSION: 0
SORE THROAT: 0
MYALGIAS: 0
DIZZINESS: 1
ABDOMINAL DISTENTION: 0
NECK PAIN: 0
DIARRHEA: 0
CONSTIPATION: 0
SPEECH DIFFICULTY: 0
ARTHRALGIAS: 0
CHILLS: 0
FREQUENCY: 0
ABDOMINAL PAIN: 0
DIFFICULTY URINATING: 0
SHORTNESS OF BREATH: 0
JOINT SWELLING: 0
NAUSEA: 0
CHEST TIGHTNESS: 0
BACK PAIN: 0
FEVER: 0
FATIGUE: 0
FACIAL ASYMMETRY: 0
PHOTOPHOBIA: 0
CHOKING: 0
APNEA: 0
HEADACHES: 1
NERVOUS/ANXIOUS: 0
WEAKNESS: 0
FLANK PAIN: 0
WOUND: 0
NUMBNESS: 0
PALPITATIONS: 0
SLEEP DISTURBANCE: 0
DYSURIA: 0
BLOOD IN STOOL: 0
VOMITING: 0
TROUBLE SWALLOWING: 0
UNEXPECTED WEIGHT CHANGE: 0
EYE REDNESS: 0
WHEEZING: 0
COUGH: 0
SEIZURES: 0
HEMATURIA: 0

## 2025-06-03 NOTE — PROGRESS NOTES
"Subjective   Patient ID: Boy Davis Jr. is a 54 y.o. male who presents for Follow-up (ER FOLLOW UP).      HPI:  Presents today for ER fu for dizziness. Dizziness remains. CT head done at ER showed otomastoiditis. He is having ringing in both ears and headaches remain.     Requesting [facility] paperwork done for copd and back pain        Visit Vitals  /84   Pulse 66   Ht 1.88 m (6' 2\")   Wt 141 kg (310 lb 12.8 oz)   SpO2 95%   BMI 39.90 kg/m²   Smoking Status Every Day   BSA 2.71 m²        Review of Systems   Constitutional:  Negative for chills, fatigue, fever and unexpected weight change.   HENT:  Negative for congestion, ear pain, sore throat and trouble swallowing.    Eyes:  Negative for photophobia, pain, redness and visual disturbance.   Respiratory:  Negative for apnea, cough, choking, chest tightness, shortness of breath and wheezing.    Cardiovascular:  Negative for chest pain, palpitations and leg swelling.   Gastrointestinal:  Negative for abdominal distention, abdominal pain, blood in stool, constipation, diarrhea, nausea and vomiting.   Genitourinary:  Negative for difficulty urinating, dysuria, flank pain, frequency, hematuria and urgency.   Musculoskeletal:  Negative for arthralgias, back pain, gait problem, joint swelling, myalgias and neck pain.   Skin:  Negative for rash and wound.   Neurological:  Positive for dizziness and headaches. Negative for seizures, syncope, facial asymmetry, speech difficulty, weakness and numbness.   Psychiatric/Behavioral:  Negative for confusion, sleep disturbance and suicidal ideas. The patient is not nervous/anxious.        Objective   Study Result    Narrative & Impression   Interpreted By:  Flash Alvarenga,   STUDY:  CT HEAD WO IV CONTRAST;  5/27/2025 6:30 pm      INDICATION:  Signs/Symptoms:headache.          COMPARISON:  None.      ACCESSION NUMBER(S):  [id number]      ORDERING CLINICIAN:  REGI SUE      TECHNIQUE:  Noncontrast axial CT scan of head was " performed. Angled reformats in  brain and bone windows were generated. The images were reviewed in  bone, brain, blood and soft tissue windows.      FINDINGS:  No intracranial hemorrhage, midline shift, mass effect, or  ventricular enlargement.      No Chiari 1 malformation. The sella turcica and suprasellar cistern  regions appear grossly unremarkable.      The orbits/globes are grossly unremarkable.      The paranasal sinuses and mastoid air cells are partially visualized.  There is partial opacification of the mastoid air cells inferiorly on  the left. There is mild mucosal thickening involving the alveolar  recess of bilateral maxillary sinuses.      On the right there is near-complete opacification of the tympanic  cavity and the mastoid air cells.      There is atherosclerotic calcification of the cavernous carotid  arteries.      Soft tissue structures at the axial level of the skull base appear  unremarkable. No acute osseous abnormality is noted.      IMPRESSION:  No intracranial hemorrhage.      Near-complete opacification of the tympanic cavity and mastoid air  cells on the right. Recommend clinical correlation for signs and  symptoms of otomastoiditis. Nonspecific partial opacification of  mastoid air cells on the left as well.     ER record reviewed    Physical Exam  Constitutional:       Appearance: Normal appearance. He is normal weight.   HENT:      Head: Normocephalic.   Eyes:      Extraocular Movements: Extraocular movements intact.      Conjunctiva/sclera: Conjunctivae normal.      Pupils: Pupils are equal, round, and reactive to light.   Cardiovascular:      Rate and Rhythm: Normal rate and regular rhythm.      Pulses: Normal pulses.      Heart sounds: Normal heart sounds.   Pulmonary:      Effort: Pulmonary effort is normal.      Breath sounds: Normal breath sounds.   Musculoskeletal:         General: Normal range of motion.      Cervical back: Normal range of motion.   Skin:     General: Skin  is warm and dry.   Neurological:      General: No focal deficit present.      Mental Status: He is alert and oriented to person, place, and time.   Psychiatric:         Mood and Affect: Mood normal.         Behavior: Behavior normal.         Thought Content: Thought content normal.         Judgment: Judgment normal.            Assessment/Plan   Problem List Items Addressed This Visit       Chronic obstructive pulmonary disease (Multi)     Other Visit Diagnoses         Tinnitus of both ears    -  Primary    Relevant Orders    Referral to ENT      Low back pain radiating to right leg            Paperwork has been fill out to patient satisfaction     PLEASE MONITOR CLOSELY FOR ANY UNTOWARD SIDE EFFECTS OR COMPLICATIONS OF MEDICATIONS. PATIENT IS STRONGLY ADVISED TO BE COMPLIANT WITH RECOMMENDATIONS. QUESTIONS AND CONCERNS WERE ADDRESSED. INSTRUCTED TO CALL, RETURN SOONER, OR GO TO THE ER,  IF SYMPTOMS PERSIST OR WORSEN. THEY VOICED UNDERSTANDING AND  DENIES FURTHER QUESTIONS AT THIS TIME.  TIME CODE  1. PREPARATION FOR PATIENT'S VISIT (REVIEWING CHART, CURRENT MEDICAL RECORDS, OUTSIDE HEALTH PROVIDER RECORDS, PREVIOUS HISTORY, EXAM, TEST, PROCEDURE, AND MEDICATIONS)  2. FACE TO FACE ENCOUNTER OBTAINING HISTORY FROM THE PATIENT/FAMILY/CAREGIVERS; PERFORMING EVALUATION AND EXAMINATION; ORDERING TESTS OR PROCEDURES; REFERRING AND COMMUNICATING WITH OTHER HEALTHCARE PROVIDERS; COUNSELING AND EDUCATION OF THE PATIENT/FAMILY/CAREGIVERS; INDEPENDENTLY INTERPRETING RESULTS (TESTS, LABS, PROCEDURES, IMAGING) AND COMMUNICATING AND EXPLAINING RESULTS TO THE PATIENT/FAMILY/CAREGIVERS  3. COORDINATION OF CARE; PREPARING AND PRINTING DISCHARGE INSTRUCTIONS AND ANY EDUCATIONAL MATERIAL FOR THE PATIENT/FAMILY/CAREGIVERS. DOCUMENTING CLINICAL INFORMATION IN THE ELECTRONIC MEDICAL RECORD   4. REVIEWING OARRS AS NEEDED    MDM  1) COMPLEXITY: MORE THAN 1 STABLE CHRONIC CONDITION ADDRESSED OR 1 ACUTE ILLNESS ADDRESSED   2)DATA: TESTS  INTERPRETED AND OR ORDERED, TOOK INDEPENDENT HISTORY OR RECORDS REVIEWED  3)RISK: MODERATE RISK DUE TO NATURE OF MEDICAL CONDITIONS/COMORBIDITY OR MEDICATIONS ORDERED OR SURGICAL OR PROCEDURE REFERRAL    Follow up as before

## 2025-06-05 ENCOUNTER — APPOINTMENT (OUTPATIENT)
Dept: PHYSICAL THERAPY | Facility: CLINIC | Age: 55
End: 2025-06-05
Payer: COMMERCIAL

## 2025-07-24 LAB
ALBUMIN/CREAT UR: 13 MG/G CREAT
CREAT UR-MCNC: 352 MG/DL (ref 20–320)
MICROALBUMIN UR-MCNC: 4.4 MG/DL
PSA SERPL-MCNC: 0.91 NG/ML

## 2025-07-29 ENCOUNTER — APPOINTMENT (OUTPATIENT)
Dept: PRIMARY CARE | Facility: CLINIC | Age: 55
End: 2025-07-29
Payer: COMMERCIAL

## 2025-07-29 VITALS
BODY MASS INDEX: 38.43 KG/M2 | WEIGHT: 299.4 LBS | DIASTOLIC BLOOD PRESSURE: 86 MMHG | SYSTOLIC BLOOD PRESSURE: 138 MMHG | HEIGHT: 74 IN | HEART RATE: 73 BPM

## 2025-07-29 DIAGNOSIS — E66.812 CLASS 2 SEVERE OBESITY DUE TO EXCESS CALORIES WITH SERIOUS COMORBIDITY AND BODY MASS INDEX (BMI) OF 38.0 TO 38.9 IN ADULT: ICD-10-CM

## 2025-07-29 DIAGNOSIS — J44.9 CHRONIC OBSTRUCTIVE PULMONARY DISEASE, UNSPECIFIED COPD TYPE (MULTI): ICD-10-CM

## 2025-07-29 DIAGNOSIS — R79.89 ELEVATED BRAIN NATRIURETIC PEPTIDE (BNP) LEVEL: ICD-10-CM

## 2025-07-29 DIAGNOSIS — G47.33 OSA ON CPAP: ICD-10-CM

## 2025-07-29 DIAGNOSIS — R31.0 GROSS HEMATURIA: Primary | ICD-10-CM

## 2025-07-29 DIAGNOSIS — E66.01 CLASS 2 SEVERE OBESITY DUE TO EXCESS CALORIES WITH SERIOUS COMORBIDITY AND BODY MASS INDEX (BMI) OF 38.0 TO 38.9 IN ADULT: ICD-10-CM

## 2025-07-29 PROBLEM — H72.91 RUPTURED EAR DRUM, RIGHT: Status: RESOLVED | Noted: 2024-11-19 | Resolved: 2025-07-29

## 2025-07-29 LAB
POC APPEARANCE, URINE: CLEAR
POC BILIRUBIN, URINE: ABNORMAL
POC BLOOD, URINE: NEGATIVE
POC COLOR, URINE: YELLOW
POC GLUCOSE, URINE: NEGATIVE MG/DL
POC KETONES, URINE: ABNORMAL MG/DL
POC LEUKOCYTES, URINE: ABNORMAL
POC NITRITE,URINE: NEGATIVE
POC PH, URINE: 6.5 PH
POC PROTEIN, URINE: ABNORMAL MG/DL
POC SPECIFIC GRAVITY, URINE: 1.02
POC UROBILINOGEN, URINE: 1 EU/DL

## 2025-07-29 PROCEDURE — 3008F BODY MASS INDEX DOCD: CPT | Performed by: NURSE PRACTITIONER

## 2025-07-29 PROCEDURE — 99214 OFFICE O/P EST MOD 30 MIN: CPT | Performed by: NURSE PRACTITIONER

## 2025-07-29 PROCEDURE — 4010F ACE/ARB THERAPY RXD/TAKEN: CPT | Performed by: NURSE PRACTITIONER

## 2025-07-29 PROCEDURE — 3079F DIAST BP 80-89 MM HG: CPT | Performed by: NURSE PRACTITIONER

## 2025-07-29 PROCEDURE — 3075F SYST BP GE 130 - 139MM HG: CPT | Performed by: NURSE PRACTITIONER

## 2025-07-29 PROCEDURE — 81003 URINALYSIS AUTO W/O SCOPE: CPT | Performed by: NURSE PRACTITIONER

## 2025-07-29 RX ORDER — FLUTICASONE FUROATE, UMECLIDINIUM BROMIDE AND VILANTEROL TRIFENATATE 200; 62.5; 25 UG/1; UG/1; UG/1
1 POWDER RESPIRATORY (INHALATION) DAILY
Qty: 3 EACH | Refills: 3 | Status: SHIPPED | OUTPATIENT
Start: 2025-07-29

## 2025-07-29 RX ORDER — NITROFURANTOIN 25; 75 MG/1; MG/1
100 CAPSULE ORAL 2 TIMES DAILY
Qty: 14 CAPSULE | Refills: 0 | Status: SHIPPED | OUTPATIENT
Start: 2025-07-29 | End: 2025-08-05

## 2025-07-29 ASSESSMENT — ENCOUNTER SYMPTOMS
FREQUENCY: 0
TROUBLE SWALLOWING: 0
EYE REDNESS: 0
ARTHRALGIAS: 0
CONSTIPATION: 0
PALPITATIONS: 0
NECK PAIN: 0
WHEEZING: 0
APNEA: 0
CHILLS: 0
FACIAL ASYMMETRY: 0
VOMITING: 0
FLANK PAIN: 0
FATIGUE: 0
WOUND: 0
UNEXPECTED WEIGHT CHANGE: 0
DIZZINESS: 0
BLOOD IN STOOL: 0
CHEST TIGHTNESS: 0
DIFFICULTY URINATING: 0
NUMBNESS: 0
SPEECH DIFFICULTY: 0
SHORTNESS OF BREATH: 1
NAUSEA: 0
COUGH: 0
PHOTOPHOBIA: 0
ABDOMINAL PAIN: 0
HEADACHES: 0
EYE PAIN: 0
ABDOMINAL DISTENTION: 0
BACK PAIN: 0
NERVOUS/ANXIOUS: 0
FEVER: 0
SLEEP DISTURBANCE: 0
WEAKNESS: 0
MYALGIAS: 0
SEIZURES: 0
DYSURIA: 0
DIARRHEA: 0
CONFUSION: 0
SORE THROAT: 0
JOINT SWELLING: 0
HEMATURIA: 0
CHOKING: 0

## 2025-07-29 NOTE — LETTER
July 29, 2025     Patient: Boy Davis Jr.   YOB: 1970   Date of Visit: 7/29/2025       To Whom It May Concern:    Boy Davis was seen in my clinic on 7/29/2025. Please excuse Boy for his absence from work on 7/28/25-8/1/25. If you have any questions or concerns, please don't hesitate to call.         Sincerely,         Nat Bryant, LUCY-CNP

## 2025-07-29 NOTE — PROGRESS NOTES
"Subjective   Patient ID: Boy Davis Jr. is a 54 y.o. male who presents for Follow-up (KIDNEY ISSUES, BLOOD IN URINE WEEK AGO, NO ENERGY,SIDE HURTING, BLOOD IN STOOL COUPLE DAYS DIFFERENT DAYS. ).      HPI:  Presents today for C/O  HEMATURIA ON/OFF X 2 WEEKS modifying factors consists of NONE  associated symptoms consist of URINARY FREQUENCY. DARK URINE. STRONG URINE SMELL. RIGHT FLANK PAIN.  BLOOD IN STOOL RESOLVED.  prior treatment consists of medication PUSHING FLUIDS       COPD- SOB ON/OFF REMAINS X 3 MONTHS. CXR AND CT CHEST UNREMARKABLE. INCREASE TRELEGY TO 200MG FROM 100MG. REFER TO PULMONOLOGY DR. MCNEILL     BNP- SLIGHTLY ELEVATED ON 5/27/25. FOLLOWS WITH CARDIO. LAST APPT 4/14/25. WILL RECHECK LEVEL    Visit Vitals  /86   Pulse 73   Ht 1.88 m (6' 2\")   Wt 136 kg (299 lb 6.4 oz)   BMI 38.44 kg/m²   Smoking Status Every Day   BSA 2.66 m²        Review of Systems   Constitutional:  Negative for chills, fatigue, fever and unexpected weight change.   HENT:  Negative for congestion, ear pain, sore throat and trouble swallowing.    Eyes:  Negative for photophobia, pain, redness and visual disturbance.   Respiratory:  Positive for shortness of breath. Negative for apnea, cough, choking, chest tightness and wheezing.    Cardiovascular:  Negative for chest pain, palpitations and leg swelling.   Gastrointestinal:  Negative for abdominal distention, abdominal pain, blood in stool, constipation, diarrhea, nausea and vomiting.   Genitourinary:  Negative for difficulty urinating, dysuria, flank pain, frequency, hematuria and urgency.   Musculoskeletal:  Negative for arthralgias, back pain, gait problem, joint swelling, myalgias and neck pain.   Skin:  Negative for rash and wound.   Neurological:  Negative for dizziness, seizures, syncope, facial asymmetry, speech difficulty, weakness, numbness and headaches.   Psychiatric/Behavioral:  Negative for confusion, sleep disturbance and suicidal ideas. The patient is " not nervous/anxious.        Objective     Component      Latest Ref Rng 7/29/2025   POC Color, Urine      Straw, Yellow, Light-Yellow  Yellow    POC Appearance, Urine      Clear  Clear    POC Glucose, Urine      NEGATIVE mg/dl NEGATIVE    POC Bilirubin, Urine      NEGATIVE  SMALL (1+) !    POC Ketones, Urine      NEGATIVE mg/dl TRACE !    POC Specific Gravity, Urine      1.005 - 1.035  1.025    POC Blood, Urine      NEGATIVE  NEGATIVE    POC PH, Urine      No Reference Range Established PH 6.5    POC Protein, Urine      NEGATIVE mg/dl >=300 (3+) !    POC Urobilinogen, Urine      0.2, 1.0 EU/DL 1.0    Poc Nitrite, Urine      NEGATIVE  NEGATIVE    POC Leukocytes, Urine      NEGATIVE  SMALL (1+) !       Legend:  ! Abnormal    Study Result    Narrative & Impression   Interpreted By:  Brian Huerta,   STUDY:  CT CHEST WO IV CONTRAST;  4/4/2025 1:11 pm      INDICATION:  Lung nodule      COMPARISON:  04/19/2022      ACCESSION NUMBER(S):  [id number]      ORDERING CLINICIAN:  ERIC BARNARD      TECHNIQUE:  Helical data acquisition of the chest was obtained  without IV  contrast material.  Images were reformatted in axial, coronal, and  sagittal planes.      FINDINGS:  LUNGS AND AIRWAYS:  No dense consolidation, mass or central airway obstruction. Clustered  coarsely calcified nodular densities in the right upper lobe are  again noted and are benign. Borderline generalized airway thickening  in the absence of any significant bronchiectasis. Stable tiny benign  nodular density without definite calcification at the left base on  image 246. Stable benign 5-6 mm noncalcified nodule in the right  lower lobe as annotated on the exam. No new, enlarging or worrisome  pulmonary nodule      MEDIASTINUM AND ELSA, LOWER NECK AND AXILLA:  No intrathoracic lymphadenopathy. The esophagus is not substantially  dilated. No masses are identified in the lower neck      HEART AND VESSELS:  Normal heart size. No pericardial effusion. Normal caliber  thoracic  aorta and pulmonary trunk. Mild atherosclerotic calcifications. A  small amount of coarse calcification is present at the level of the  aortic valve leaflets, coronal series 6, image 75. Mild coronary  artery calcifications.      UPPER ABDOMEN:  Prior cholecystectomy. No acute upper abdominal finding is identified  on this noncontrast exam. Numerous small low-attenuation hepatic  lesions again present, presumably cysts and/or biliary hamartomas      CHEST WALL AND OSSEOUS STRUCTURES:  No significant soft tissue findings. No lytic or blastic osseous  lesion is identified. New inferior endplate compression deformity at  the T12 vertebral body level as compared to the prior examination,  series 7, image 77. Stable mild T9 and T12 vertebral body superior  endplate compression deformities.      IMPRESSION:  1.  Stable benign calcified and noncalcified small nodules.  2. Aortic valve leaflet calcification noted. Advise correlation with  the recent echocardiogram to exclude any associated calcific aortic  valvular stenosis.  3. New T12 vertebral body inferior endplate compression deformity as  compared to the prior chest CT, also new as compared to prior lumbar  spine MRI 06/21/2023. Dedicated imaging may be performed as  clinically appropriate.      MACRO:  None       Study Result    Narrative & Impression   Interpreted By:  Juan F Sharp,   STUDY:  XR CHEST 2 VIEWS;  3/20/2025 11:23 am      INDICATION:  Signs/Symptoms:COUGH.      COMPARISON:  Chest radiograph 05/21/2024      ACCESSION NUMBER(S):  [id number]      ORDERING CLINICIAN:  ERIC BARNARD      FINDINGS:  PA and lateral radiographs of the chest were provided.      DEVICES:  None      CARDIOMEDIASTINAL SILHOUETTE:  Cardiomediastinal silhouette is normal in size and configuration.No  significant atherosclerotic calcification.      LUNGS:  No focal consolidation. No pneumothorax. No pleural effusion.      BONES:  No acute osseous changes.       IMPRESSION:  1.  No evidence of acute cardiopulmonary process.           Physical Exam  Constitutional:       Appearance: Normal appearance. He is normal weight.   HENT:      Head: Normocephalic.     Eyes:      Extraocular Movements: Extraocular movements intact.      Conjunctiva/sclera: Conjunctivae normal.      Pupils: Pupils are equal, round, and reactive to light.       Cardiovascular:      Rate and Rhythm: Normal rate and regular rhythm.      Pulses: Normal pulses.      Heart sounds: Normal heart sounds.   Pulmonary:      Effort: Pulmonary effort is normal.      Breath sounds: Normal breath sounds.     Musculoskeletal:         General: Normal range of motion.      Cervical back: Normal range of motion.     Skin:     General: Skin is warm and dry.     Neurological:      General: No focal deficit present.      Mental Status: He is alert and oriented to person, place, and time.     Psychiatric:         Mood and Affect: Mood normal.         Behavior: Behavior normal.         Thought Content: Thought content normal.         Judgment: Judgment normal.            Assessment/Plan   Problem List Items Addressed This Visit       Chronic obstructive pulmonary disease (Multi)    Relevant Medications    fluticasone-umeclidin-vilanter (Trelegy Ellipta) 200-62.5-25 mcg blister with device    Other Relevant Orders    Referral to Pulmonology    [place] on CPAP    Relevant Orders    Referral to Pulmonology     Other Visit Diagnoses         Gross hematuria    -  Primary    Relevant Medications    nitrofurantoin, macrocrystal-monohydrate, (Macrobid) 100 mg capsule    Other Relevant Orders    POCT UA Automated manually resulted (Completed)    Urine Culture    US renal complete      Elevated brain natriuretic peptide (BNP) level        Relevant Orders    B-Type Natriuretic Peptide    Basic Metabolic Panel            WE DISCUSSED MOST COMMON SIDE EFFECTS OF PRESCRIBED MEDICATIONS. INDICATIONS, RISK, COMPLICATIONS, AND ALTERNATIVES OF  MEDICATION/THERAPEUTICS WERE EXPLAINED AND DISCUSSED. PLEASE MONITOR CLOSELY FOR ANY UNTOWARD SIDE EFFECTS OR COMPLICATIONS OF MEDICATIONS. PATIENT IS STRONGLY ADVISED TO BE COMPLIANT WITH RECOMMENDATIONS. QUESTIONS AND CONCERNS WERE ADDRESSED. INSTRUCTED TO CALL, RETURN SOONER, OR GO TO THE ER,  IF SYMPTOMS PERSIST OR WORSEN. THEY VOICED UNDERSTANDING AND  DENIES FURTHER QUESTIONS AT THIS TIME.    TIME CODE  1. PREPARATION FOR PATIENT'S VISIT (REVIEWING CHART, CURRENT MEDICAL RECORDS, OUTSIDE HEALTH PROVIDER RECORDS, PREVIOUS HISTORY, EXAM, TEST, PROCEDURE, AND MEDICATIONS)  2. FACE TO FACE ENCOUNTER OBTAINING HISTORY FROM THE PATIENT/FAMILY/CAREGIVERS; PERFORMING EVALUATION AND EXAMINATION; ORDERING TESTS OR PROCEDURES; REFERRING AND COMMUNICATING WITH OTHER HEALTHCARE PROVIDERS; COUNSELING AND EDUCATION OF THE PATIENT/FAMILY/CAREGIVERS; INDEPENDENTLY INTERPRETING RESULTS (TESTS, LABS, PROCEDURES, IMAGING) AND COMMUNICATING AND EXPLAINING RESULTS TO THE PATIENT/FAMILY/CAREGIVERS  3. COORDINATION OF CARE; PREPARING AND PRINTING DISCHARGE INSTRUCTIONS AND ANY EDUCATIONAL MATERIAL FOR THE PATIENT/FAMILY/CAREGIVERS. DOCUMENTING CLINICAL INFORMATION IN THE ELECTRONIC MEDICAL RECORD   4. REVIEWING OARRS AS NEEDED    MDM  1) COMPLEXITY: MORE THAN 1 STABLE CHRONIC CONDITION ADDRESSED OR 1 ACUTE ILLNESS ADDRESSED   2)DATA: TESTS INTERPRETED AND OR ORDERED, TOOK INDEPENDENT HISTORY OR RECORDS REVIEWED  3)RISK: MODERATE RISK DUE TO NATURE OF MEDICAL CONDITIONS/COMORBIDITY OR MEDICATIONS ORDERED OR SURGICAL OR PROCEDURE REFERRAL    2 WEEKS

## 2025-07-30 ENCOUNTER — HOSPITAL ENCOUNTER (OUTPATIENT)
Dept: RADIOLOGY | Facility: HOSPITAL | Age: 55
Discharge: HOME | End: 2025-07-30
Payer: COMMERCIAL

## 2025-07-30 DIAGNOSIS — R31.0 GROSS HEMATURIA: ICD-10-CM

## 2025-07-30 PROCEDURE — 76770 US EXAM ABDO BACK WALL COMP: CPT | Performed by: RADIOLOGY

## 2025-07-30 PROCEDURE — 76770 US EXAM ABDO BACK WALL COMP: CPT

## 2025-07-31 ENCOUNTER — RESULTS FOLLOW-UP (OUTPATIENT)
Dept: PRIMARY CARE | Facility: CLINIC | Age: 55
End: 2025-07-31
Payer: COMMERCIAL

## 2025-07-31 LAB
ANION GAP SERPL CALCULATED.4IONS-SCNC: 10 MMOL/L (CALC) (ref 7–17)
BNP SERPL-MCNC: 114 PG/ML
BUN SERPL-MCNC: 16 MG/DL (ref 7–25)
BUN/CREAT SERPL: NORMAL (CALC) (ref 6–22)
CALCIUM SERPL-MCNC: 9.4 MG/DL (ref 8.6–10.3)
CHLORIDE SERPL-SCNC: 103 MMOL/L (ref 98–110)
CO2 SERPL-SCNC: 27 MMOL/L (ref 20–32)
CREAT SERPL-MCNC: 0.95 MG/DL (ref 0.7–1.3)
EGFRCR SERPLBLD CKD-EPI 2021: 95 ML/MIN/1.73M2
GLUCOSE SERPL-MCNC: 109 MG/DL (ref 65–139)
POTASSIUM SERPL-SCNC: 4.3 MMOL/L (ref 3.5–5.3)
SODIUM SERPL-SCNC: 140 MMOL/L (ref 135–146)

## 2025-07-31 NOTE — TELEPHONE ENCOUNTER
----- Message from Nat Bryant sent at 7/31/2025  3:48 PM EDT -----  PLEASE HAVE SUSCEPTIBILITY DONE   ----- Message -----  From: Hayley Villanueva MA  Sent: 7/29/2025   9:38 AM EDT  To: LUCY Galvez-CNP     Render Note In Bullet Format When Appropriate: No Pared With?: curette Consent: The patient's consent was obtained including but not limited to risks of crusting, scabbing, blistering, scarring, darker or lighter pigmentary change, recurrence, incomplete removal and infection. Medical Necessity Clause: This procedure was medically necessary because the lesions that were treated were: Post-Care Instructions: I reviewed with the patient in detail post-care instructions. Patient is to wear sunprotection, and avoid picking at any of the treated lesions. Pt may apply Vaseline to crusted or scabbing areas. Medical Necessity Information: It is in your best interest to select a reason for this procedure from the list below. All of these items fulfill various CMS LCD requirements except the new and changing color options. Detail Level: Detailed

## 2025-08-01 ENCOUNTER — TELEPHONE (OUTPATIENT)
Dept: PRIMARY CARE | Facility: CLINIC | Age: 55
End: 2025-08-01
Payer: COMMERCIAL

## 2025-08-01 DIAGNOSIS — N28.89 RENAL MASS, RIGHT: Primary | ICD-10-CM

## 2025-08-01 NOTE — TELEPHONE ENCOUNTER
PT NOTIFIED OF THE DATE/TIME CHANGE OF THE CT AND THAT THE F/U IS 8/6 NOW- HE WOULD LIKE WORK SLIP TO BE FOR 8/4/25, 8/5/25 AND 8/6/25 IF YOU WOULD.

## 2025-08-01 NOTE — LETTER
August 1, 2025     Patient: Boy Davis Jr.   YOB: 1970   Date of Visit: 8/1/2025       To Whom It May Concern:    Please excuse Boy Davis for his absence from work on 8/4/25-8/6/2025. If you have any questions or concerns, please don't hesitate to call.         Sincerely,         Nat Bryant, LUCY-CNP

## 2025-08-01 NOTE — TELEPHONE ENCOUNTER
Renal US shows  right renal mass. Recommends MRI or CT  renal protocol. I tried to call him but no answer. VM left. Please start precert.

## 2025-08-01 NOTE — LETTER
August 1, 2025     Patient: Boy Davis .   YOB: 1970   Date of Visit:        To Whom It May Concern:    Please excuse Boy Davis for his absence from work on 8/11/25 and 8/12/25. If you have any questions or concerns, please don't hesitate to call.         Sincerely,         Nat Bryant, APRN-CNP

## 2025-08-03 LAB — BACTERIA UR CULT: ABNORMAL

## 2025-08-04 ENCOUNTER — HOSPITAL ENCOUNTER (OUTPATIENT)
Dept: RADIOLOGY | Facility: HOSPITAL | Age: 55
Discharge: HOME | End: 2025-08-04
Payer: COMMERCIAL

## 2025-08-04 DIAGNOSIS — N28.89 RENAL MASS, RIGHT: ICD-10-CM

## 2025-08-04 PROCEDURE — 2550000001 HC RX 255 CONTRASTS: Mod: JW | Performed by: NURSE PRACTITIONER

## 2025-08-04 PROCEDURE — 74170 CT ABD WO CNTRST FLWD CNTRST: CPT | Performed by: RADIOLOGY

## 2025-08-04 PROCEDURE — 74170 CT ABD WO CNTRST FLWD CNTRST: CPT

## 2025-08-04 RX ADMIN — IOHEXOL 73 ML: 350 INJECTION, SOLUTION INTRAVENOUS at 15:20

## 2025-08-05 ENCOUNTER — APPOINTMENT (OUTPATIENT)
Dept: RADIOLOGY | Facility: HOSPITAL | Age: 55
End: 2025-08-05
Payer: COMMERCIAL

## 2025-08-06 ENCOUNTER — APPOINTMENT (OUTPATIENT)
Dept: PRIMARY CARE | Facility: CLINIC | Age: 55
End: 2025-08-06
Payer: COMMERCIAL

## 2025-08-07 ENCOUNTER — OFFICE VISIT (OUTPATIENT)
Dept: PRIMARY CARE | Facility: CLINIC | Age: 55
End: 2025-08-07
Payer: COMMERCIAL

## 2025-08-07 VITALS
HEIGHT: 74 IN | SYSTOLIC BLOOD PRESSURE: 134 MMHG | WEIGHT: 294.6 LBS | DIASTOLIC BLOOD PRESSURE: 86 MMHG | HEART RATE: 70 BPM | BODY MASS INDEX: 37.81 KG/M2

## 2025-08-07 DIAGNOSIS — E66.812 CLASS 2 SEVERE OBESITY DUE TO EXCESS CALORIES WITH SERIOUS COMORBIDITY AND BODY MASS INDEX (BMI) OF 37.0 TO 37.9 IN ADULT: ICD-10-CM

## 2025-08-07 DIAGNOSIS — E66.01 CLASS 2 SEVERE OBESITY DUE TO EXCESS CALORIES WITH SERIOUS COMORBIDITY AND BODY MASS INDEX (BMI) OF 37.0 TO 37.9 IN ADULT: ICD-10-CM

## 2025-08-07 DIAGNOSIS — N39.0 URINARY TRACT INFECTION WITH HEMATURIA, SITE UNSPECIFIED: Primary | ICD-10-CM

## 2025-08-07 DIAGNOSIS — R31.9 URINARY TRACT INFECTION WITH HEMATURIA, SITE UNSPECIFIED: Primary | ICD-10-CM

## 2025-08-07 DIAGNOSIS — N28.1 RENAL CYST: ICD-10-CM

## 2025-08-07 PROCEDURE — 3008F BODY MASS INDEX DOCD: CPT | Performed by: NURSE PRACTITIONER

## 2025-08-07 PROCEDURE — 4010F ACE/ARB THERAPY RXD/TAKEN: CPT | Performed by: NURSE PRACTITIONER

## 2025-08-07 PROCEDURE — 3075F SYST BP GE 130 - 139MM HG: CPT | Performed by: NURSE PRACTITIONER

## 2025-08-07 PROCEDURE — 99214 OFFICE O/P EST MOD 30 MIN: CPT | Performed by: NURSE PRACTITIONER

## 2025-08-07 PROCEDURE — 3079F DIAST BP 80-89 MM HG: CPT | Performed by: NURSE PRACTITIONER

## 2025-08-07 RX ORDER — LEVOFLOXACIN 500 MG/1
500 TABLET, FILM COATED ORAL DAILY
Qty: 7 TABLET | Refills: 0 | Status: SHIPPED | OUTPATIENT
Start: 2025-08-07 | End: 2025-08-14

## 2025-08-07 ASSESSMENT — ENCOUNTER SYMPTOMS
SLEEP DISTURBANCE: 0
TROUBLE SWALLOWING: 0
HEMATURIA: 0
UNEXPECTED WEIGHT CHANGE: 0
ABDOMINAL DISTENTION: 0
NECK PAIN: 0
NERVOUS/ANXIOUS: 0
EYE PAIN: 0
SEIZURES: 0
WOUND: 0
SHORTNESS OF BREATH: 0
SORE THROAT: 0
FREQUENCY: 0
FLANK PAIN: 0
DIFFICULTY URINATING: 0
CHOKING: 0
DIARRHEA: 0
VOMITING: 0
NUMBNESS: 0
FACIAL ASYMMETRY: 0
PHOTOPHOBIA: 0
CONSTIPATION: 0
SPEECH DIFFICULTY: 0
MYALGIAS: 0
FEVER: 0
WHEEZING: 0
DIZZINESS: 0
FATIGUE: 0
WEAKNESS: 0
JOINT SWELLING: 0
ARTHRALGIAS: 0
BLOOD IN STOOL: 0
COUGH: 0
CHEST TIGHTNESS: 0
PALPITATIONS: 0
HEADACHES: 0
BACK PAIN: 0
ABDOMINAL PAIN: 0
CONFUSION: 0
APNEA: 0
NAUSEA: 0
CHILLS: 0
DYSURIA: 0
EYE REDNESS: 0

## 2025-08-07 NOTE — PROGRESS NOTES
"Subjective   Patient ID: Boy Davis Jr. is a 54 y.o. male who presents for Follow-up (FOLLOW UP CT).    HPI:  Presents today for FU CT ABD.  NO FURTHER COMPLAINTS.     RENAL MASS/LESION NOTED ON RENAL US SHOWS BOSNIAK TYPE 2 CYST. I WILL REFER TO NEPH   UTI- CULTURE RESULTS BACK. I WILL START HIM ON LEVAQUIN     Visit Vitals  /86   Pulse 70   Ht 1.88 m (6' 2\")   Wt 134 kg (294 lb 9.6 oz)   BMI 37.82 kg/m²   Smoking Status Every Day   BSA 2.65 m²        Review of Systems   Constitutional:  Negative for chills, fatigue, fever and unexpected weight change.   HENT:  Negative for congestion, ear pain, sore throat and trouble swallowing.    Eyes:  Negative for photophobia, pain, redness and visual disturbance.   Respiratory:  Negative for apnea, cough, choking, chest tightness, shortness of breath and wheezing.    Cardiovascular:  Negative for chest pain, palpitations and leg swelling.   Gastrointestinal:  Negative for abdominal distention, abdominal pain, blood in stool, constipation, diarrhea, nausea and vomiting.   Genitourinary:  Negative for difficulty urinating, dysuria, flank pain, frequency, hematuria and urgency.   Musculoskeletal:  Negative for arthralgias, back pain, gait problem, joint swelling, myalgias and neck pain.   Skin:  Negative for rash and wound.   Neurological:  Negative for dizziness, seizures, syncope, facial asymmetry, speech difficulty, weakness, numbness and headaches.   Psychiatric/Behavioral:  Negative for confusion, sleep disturbance and suicidal ideas. The patient is not nervous/anxious.        Objective   Status Exam Begun Exam Ended   Final 8/04/2025 14:37 8/04/2025 15:19     Study Result    Narrative & Impression   Interpreted By:  Zander Eisenberg,   STUDY:  CT ABDOMEN W AND WO IV CONTRAST;  8/4/2025 3:19 pm      INDICATION:  Signs/Symptoms:right renal mass noted in US.      ,N28.89 Other specified disorders of kidney and ureter      COMPARISON:  Renal ultrasound 30 July 2025; CT " chest without contrast 4 April 2025; MRI lumbar spine without and with contrast 21 June 2023; CT  chest with contrast 19 April 2022, 24 November 2020, 25 November 2014; CT abdomen and pelvis without contrast 9 August 2011      ACCESSION NUMBER(S):  [id number]      ORDERING CLINICIAN:  ERIC BARNARD      TECHNIQUE:  CT Kidney protocol CT of the abdomen from the lung bases to the iliac  crests before and in two phases after the uneventful administration  of intravenous (73 mL Omnipaque 350) contrast. No oral contrast      FINDINGS:  RIGHT KIDNEY AND INCLUDED URETER:  Radiodense stone:  Negative  Hydronephrosis:  Negative  Solid mass:  Negative. No solid, enhancing or otherwise suspect mass  Enhancement:  Normal, uniform, enhancement.  No infarct or CT  evidence of acute pyelonephritis or obstructive uropathy. Cyst/s:  The larger lesion in question from ultrasound 30 July 2025 is  confirmed not solid but a Bosniak type 2 cyst, a cyst complicated  only by hemorrhagic or other proteinaceous debris, accounting for  above simple water attenuation composition before contrast but a  complete absence of enhancement after contrast which I have  demonstrated with regions of interest before and in both phases after  contrast on axial, coronal and sagittal images, all of which appear  in the key image series. It is roughly spherical and approximately 3  cm diameter, without change from the recent ultrasound. Not suspected  based on the prior ultrasound (due to the differences in sensitivity  and specificity between ultrasound and MRI), there is also a  subcentimeter simple renal cyst (Bosniak Type 1, does not require  follow-up) which I have annotated on the nephrographic phase axial  series 9, image 63 (also appears in the key image series)      LEFT KIDNEY AND INCLUDED URETER:  Radiodense stone:  Negative  Hydronephrosis:  Negative  Solid mass:  Negative. No solid, enhancing or otherwise suspect mass  Enhancement:  Normal,  uniform, enhancement.  No infarct or CT  evidence of acute pyelonephritis or obstructive uropathy. Cyst/s:  Negative      LIVER:  Few small simple cysts and subcentimeter too small to  characterize low-attenuation lesions of doubtful clinical  consequence, many of which were present and unchanged compared to old  CTs of the chest and at least a few were also present on CT abdomen  and pelvis 9 August 2011      SPLEEN:  Normal. No enlargement, mass or evidence of splenic vein  thrombosis.      PANCREAS:  Normal. No CT evidence of acute or chronic pancreatitis.  No duct dilation. No mass.      GALLBLADDER:  Surgically absent.      BILE DUCTS:  Normal. No biliary duct dilation.      ADRENAL GLANDS:  Normal. No nodule or mass.      LYMPH NODES:  No adenopathy, intraperitoneal, retroperitoneal, pelvic  or otherwise      INCLUDED BOWEL:  Normal. No small bowel dilation or any other sign of  small bowel obstruction. Included colon not dilated or inflamed.      STOMACH / DUODENUM:  Grossly normal by CT which has limited  sensitivity and specificity for the stomach and duodenum.      INCLUDED RETROPERITONEUM:  Normal. No acute hemorrhage or  inflammatory change. Lymph nodes in a separate dedicated section.      INCLUDED OMENTUM, MESENTERY AND PERITONEAL SPACES:  Free intraperitoneal air:  Negative  Free intraperitoneal fluid:  Negative  Abscess:  Negative  Other:  n/a      VASCULATURE:  No abdominal aortic or iliac artery aneurysm. No high  grade stenosis of the major abdominal aortic branch vessels. Portal  venous system patent.      INCLUDED ABDOMINAL WALL:  Hernia:  Negative  Other:  No acute or contributory abnormality      LOWER CHEST:  No acute airspace disease      BONES:  No acute findings      IMPRESSION:  Exophytic right renal lesion in question from ultrasound 30 July 2025  is confirmed on today's more sensitive and specific exam to be not  solid, but rather a hemorrhagic cyst, a cyst complicated only  by  hemorrhagic or other proteinaceous debris within it, but no  enhancement or any other alarming complexity. It is a Bosniak type 2  lesion. It was not present on the next most recent imaging that  included this part of the right kidney, CT abdomen and pelvis without  IV contrast 9 August 2011 (intervening chest CTs and lumbar spine MRI  did not include this portion of the right kidney); however, as a  Bosniak type 2 lesion, follow-up is not required but may be obtained  at clinician's discretion      Not suspected based on the prior ultrasound (due to the differences  in sensitivity and specificity between ultrasound and CT), right  kidney has one additional cysts, this one is 1 cm or smaller and a  simple renal cyst (Bosniak Type 1, does not require follow-up)      No solid/enhancing right renal (or left renal, or any other  abdominal) mass suspect for neoplasm      No acute unexpected findings      The recent ultrasound was ordered in part for hematuria. Today's exam  redemonstrates absence of stone in either kidney but also includes  about the proximal one half of each ureters (which the prior  ultrasound could not) act confirms no stone in at least the proximal  one half of either ureter      For any ongoing unexplained hematuria, consider CT urogram or MR  urogram      MACRO:  None      Signed by: Zander Eisenberg 8/5/2025 10:50 AM  Dictation workstation:   [id number]         Component    CULTURE, URINE, ROUTINE SEE NOTE Abnormal  VC    Comment:    CULTURE, URINE, ROUTINE       Micro Number:      [id number]    Test Status:       Final  =>Specimen Source:   Urine    Specimen Quality:  Adequate    Result:            10,000-49,000 CFU/mL of Group B Streptococcus isolated                       Beta-hemolytic streptococci are predictably                       susceptible to Penicillin and other beta-lactams.                       Susceptibility testing not routinely performed.                       Please contact the  laboratory within 3 days if                       susceptibility testing is desired.                              Group B Strep                            ----------------                            INT   USHA     AMPICILLIN             S     <=0.25     CEFOTAXIME             S     <=0.12     CEFTRIAXONE            S     <=0.12     LEVOFLOXACIN           S     2     VANCOMYCIN             S     0.5    S = Susceptible  I = Intermediate  R = Resistant  NS = Not susceptible  SDD = Susceptible Dose Dependent  * = Not Tested  NR = Not Reported  **NN = See Therapy Comments          Physical Exam  Constitutional:       Appearance: Normal appearance. He is normal weight.   HENT:      Head: Normocephalic.     Eyes:      Extraocular Movements: Extraocular movements intact.      Conjunctiva/sclera: Conjunctivae normal.      Pupils: Pupils are equal, round, and reactive to light.       Cardiovascular:      Rate and Rhythm: Normal rate and regular rhythm.      Pulses: Normal pulses.      Heart sounds: Normal heart sounds.   Pulmonary:      Effort: Pulmonary effort is normal.      Breath sounds: Normal breath sounds.     Musculoskeletal:         General: Normal range of motion.      Cervical back: Normal range of motion.     Skin:     General: Skin is warm and dry.     Neurological:      General: No focal deficit present.      Mental Status: He is alert and oriented to person, place, and time.     Psychiatric:         Mood and Affect: Mood normal.         Behavior: Behavior normal.         Thought Content: Thought content normal.         Judgment: Judgment normal.            Assessment/Plan   Problem List Items Addressed This Visit       Class 2 severe obesity due to excess calories with serious comorbidity and body mass index (BMI) of 37.0 to 37.9 in adult    Urinary tract infection with hematuria - Primary    Relevant Medications    levoFLOXacin (Levaquin) 500 mg tablet    Renal cyst    Relevant Orders    Referral to Nephrology        WE DISCUSSED MOST COMMON SIDE EFFECTS OF PRESCRIBED MEDICATIONS. INDICATIONS, RISK, COMPLICATIONS, AND ALTERNATIVES OF MEDICATION/THERAPEUTICS WERE EXPLAINED AND DISCUSSED. PLEASE MONITOR CLOSELY FOR ANY UNTOWARD SIDE EFFECTS OR COMPLICATIONS OF MEDICATIONS. PATIENT IS STRONGLY ADVISED TO BE COMPLIANT WITH RECOMMENDATIONS. QUESTIONS AND CONCERNS WERE ADDRESSED. INSTRUCTED TO CALL, RETURN SOONER, OR GO TO THE ER,  IF SYMPTOMS PERSIST OR WORSEN. THEY VOICED UNDERSTANDING AND  DENIES FURTHER QUESTIONS AT THIS TIME.    TIME CODE  1. PREPARATION FOR PATIENT'S VISIT (REVIEWING CHART, CURRENT MEDICAL RECORDS, OUTSIDE HEALTH PROVIDER RECORDS, PREVIOUS HISTORY, EXAM, TEST, PROCEDURE, AND MEDICATIONS)  2. FACE TO FACE ENCOUNTER OBTAINING HISTORY FROM THE PATIENT/FAMILY/CAREGIVERS; PERFORMING EVALUATION AND EXAMINATION; ORDERING TESTS OR PROCEDURES; REFERRING AND COMMUNICATING WITH OTHER HEALTHCARE PROVIDERS; COUNSELING AND EDUCATION OF THE PATIENT/FAMILY/CAREGIVERS; INDEPENDENTLY INTERPRETING RESULTS (TESTS, LABS, PROCEDURES, IMAGING) AND COMMUNICATING AND EXPLAINING RESULTS TO THE PATIENT/FAMILY/CAREGIVERS  3. COORDINATION OF CARE; PREPARING AND PRINTING DISCHARGE INSTRUCTIONS AND ANY EDUCATIONAL MATERIAL FOR THE PATIENT/FAMILY/CAREGIVERS. DOCUMENTING CLINICAL INFORMATION IN THE ELECTRONIC MEDICAL RECORD   4. REVIEWING OARRS AS NEEDED    MDM  1) COMPLEXITY: MORE THAN 1 STABLE CHRONIC CONDITION ADDRESSED OR 1 ACUTE ILLNESS ADDRESSED   2)DATA: TESTS INTERPRETED AND OR ORDERED, TOOK INDEPENDENT HISTORY OR RECORDS REVIEWED  3)RISK: MODERATE RISK DUE TO NATURE OF MEDICAL CONDITIONS/COMORBIDITY OR MEDICATIONS ORDERED OR SURGICAL OR PROCEDURE REFERRAL    Follow up as before

## 2025-08-07 NOTE — LETTER
August 7, 2025     Patient: Boy Davis Jr.   YOB: 1970   Date of Visit: 8/7/2025       To Whom It May Concern:    Boy Davis was seen in my clinic on 8/7/2025 at 7:40 am. Please excuse Boy for his absence from work on 8/7/25 and 8/8/25. If you have any questions or concerns, please don't hesitate to call.         Sincerely,         Nat Bryant, LUCY-CNP

## 2025-08-08 ENCOUNTER — OFFICE VISIT (OUTPATIENT)
Dept: PRIMARY CARE | Facility: CLINIC | Age: 55
End: 2025-08-08
Payer: COMMERCIAL

## 2025-08-08 VITALS
SYSTOLIC BLOOD PRESSURE: 135 MMHG | DIASTOLIC BLOOD PRESSURE: 86 MMHG | BODY MASS INDEX: 37.73 KG/M2 | HEIGHT: 74 IN | WEIGHT: 294 LBS | HEART RATE: 71 BPM

## 2025-08-08 DIAGNOSIS — E66.01 CLASS 2 SEVERE OBESITY DUE TO EXCESS CALORIES WITH SERIOUS COMORBIDITY AND BODY MASS INDEX (BMI) OF 37.0 TO 37.9 IN ADULT: ICD-10-CM

## 2025-08-08 DIAGNOSIS — E11.59 HYPERTENSION ASSOCIATED WITH DIABETES: ICD-10-CM

## 2025-08-08 DIAGNOSIS — E11.69 MIXED HYPERLIPIDEMIA DUE TO TYPE 2 DIABETES MELLITUS (MULTI): ICD-10-CM

## 2025-08-08 DIAGNOSIS — E55.9 VITAMIN D DEFICIENCY: ICD-10-CM

## 2025-08-08 DIAGNOSIS — E78.2 MIXED HYPERLIPIDEMIA DUE TO TYPE 2 DIABETES MELLITUS (MULTI): ICD-10-CM

## 2025-08-08 DIAGNOSIS — Z11.59 NEED FOR HEPATITIS B SCREENING TEST: Primary | ICD-10-CM

## 2025-08-08 DIAGNOSIS — E66.812 CLASS 2 SEVERE OBESITY DUE TO EXCESS CALORIES WITH SERIOUS COMORBIDITY AND BODY MASS INDEX (BMI) OF 37.0 TO 37.9 IN ADULT: ICD-10-CM

## 2025-08-08 DIAGNOSIS — R51.9 ACUTE INTRACTABLE HEADACHE, UNSPECIFIED HEADACHE TYPE: ICD-10-CM

## 2025-08-08 DIAGNOSIS — E61.1 LOW IRON: ICD-10-CM

## 2025-08-08 DIAGNOSIS — Z11.59 NEED FOR HEPATITIS C SCREENING TEST: ICD-10-CM

## 2025-08-08 DIAGNOSIS — D64.9 LOW HEMOGLOBIN: ICD-10-CM

## 2025-08-08 DIAGNOSIS — Z11.4 ENCOUNTER FOR SCREENING FOR HIV: ICD-10-CM

## 2025-08-08 DIAGNOSIS — I15.2 HYPERTENSION ASSOCIATED WITH DIABETES: ICD-10-CM

## 2025-08-08 DIAGNOSIS — R42 DIZZINESS: ICD-10-CM

## 2025-08-08 DIAGNOSIS — N39.0 URINARY TRACT INFECTION WITHOUT HEMATURIA, SITE UNSPECIFIED: ICD-10-CM

## 2025-08-08 DIAGNOSIS — K21.9 GASTROESOPHAGEAL REFLUX DISEASE, UNSPECIFIED WHETHER ESOPHAGITIS PRESENT: ICD-10-CM

## 2025-08-08 PROCEDURE — 3008F BODY MASS INDEX DOCD: CPT | Performed by: NURSE PRACTITIONER

## 2025-08-08 PROCEDURE — 99214 OFFICE O/P EST MOD 30 MIN: CPT | Performed by: NURSE PRACTITIONER

## 2025-08-08 PROCEDURE — 3075F SYST BP GE 130 - 139MM HG: CPT | Performed by: NURSE PRACTITIONER

## 2025-08-08 PROCEDURE — 3079F DIAST BP 80-89 MM HG: CPT | Performed by: NURSE PRACTITIONER

## 2025-08-08 PROCEDURE — 4010F ACE/ARB THERAPY RXD/TAKEN: CPT | Performed by: NURSE PRACTITIONER

## 2025-08-08 RX ORDER — PREDNISONE 10 MG/1
TABLET ORAL
Qty: 21 TABLET | Refills: 8 | Status: SHIPPED | OUTPATIENT
Start: 2025-08-08

## 2025-08-08 RX ORDER — NAPROXEN 500 MG/1
TABLET ORAL
Qty: 60 TABLET | Refills: 0 | Status: SHIPPED | OUTPATIENT
Start: 2025-08-08

## 2025-08-08 RX ORDER — CALCIUM CARBONATE/VITAMIN D3 500-10/5ML
LIQUID (ML) ORAL
Qty: 30 CAPSULE | Refills: 0 | Status: SHIPPED | OUTPATIENT
Start: 2025-08-08

## 2025-08-08 RX ORDER — FAMOTIDINE 20 MG/1
20 TABLET, FILM COATED ORAL 2 TIMES DAILY
Qty: 180 TABLET | Refills: 3 | Status: SHIPPED | OUTPATIENT
Start: 2025-08-08

## 2025-08-08 ASSESSMENT — ENCOUNTER SYMPTOMS
CONFUSION: 0
APNEA: 0
NECK PAIN: 0
NUMBNESS: 0
DIFFICULTY URINATING: 0
DIZZINESS: 1
CHILLS: 0
BACK PAIN: 0
FLANK PAIN: 0
NAUSEA: 0
SEIZURES: 0
HEADACHES: 1
CHOKING: 0
MYALGIAS: 0
WHEEZING: 0
PALPITATIONS: 0
COUGH: 0
EYE PAIN: 0
JOINT SWELLING: 0
WEAKNESS: 0
FACIAL ASYMMETRY: 0
UNEXPECTED WEIGHT CHANGE: 0
EYE REDNESS: 0
FEVER: 0
WOUND: 0
PHOTOPHOBIA: 0
ABDOMINAL PAIN: 0
DIARRHEA: 0
HEMATURIA: 0
SLEEP DISTURBANCE: 0
SORE THROAT: 0
ARTHRALGIAS: 0
NERVOUS/ANXIOUS: 0
FREQUENCY: 0
ABDOMINAL DISTENTION: 0
CONSTIPATION: 0
VOMITING: 0
SPEECH DIFFICULTY: 0
TROUBLE SWALLOWING: 0
FATIGUE: 0
DYSURIA: 0
SHORTNESS OF BREATH: 0
BLOOD IN STOOL: 0
CHEST TIGHTNESS: 0

## 2025-08-08 NOTE — PROGRESS NOTES
"Subjective   Patient ID: Boy Davis Jr. is a 54 y.o. male who presents for Follow-up (C/O DIZZINESS, HA).      HPI:  Presents today for C/O HA AND DIZZINESS ON/OFF X SEVERAL MONTHS  modifying factors consists of HE HAS SEEN CARDIO AND THEY R/O ANY HEART ISSUE. CT HEAD NEGATIVE. HE IS CURRENTLY ON LEVAQUIN FOR A UTI.  associated symptoms consist of NO CP. SOB ON EXERTION ON/OFF BUT DOES HAVE COPD.  prior treatment consists of medication NONE    HE IS REQUESTING HEPATITIS SCREEN AND HIV SCREEN.     Visit Vitals  /86   Pulse 71   Ht 1.88 m (6' 2\")   Wt 133 kg (294 lb)   BMI 37.75 kg/m²   Smoking Status Every Day   BSA 2.64 m²        Review of Systems   Constitutional:  Negative for chills, fatigue, fever and unexpected weight change.   HENT:  Negative for congestion, ear pain, sore throat and trouble swallowing.    Eyes:  Negative for photophobia, pain, redness and visual disturbance.   Respiratory:  Negative for apnea, cough, choking, chest tightness, shortness of breath and wheezing.    Cardiovascular:  Negative for chest pain, palpitations and leg swelling.   Gastrointestinal:  Negative for abdominal distention, abdominal pain, blood in stool, constipation, diarrhea, nausea and vomiting.   Genitourinary:  Negative for difficulty urinating, dysuria, flank pain, frequency, hematuria and urgency.   Musculoskeletal:  Negative for arthralgias, back pain, gait problem, joint swelling, myalgias and neck pain.   Skin:  Negative for rash and wound.   Neurological:  Positive for dizziness and headaches. Negative for seizures, syncope, facial asymmetry, speech difficulty, weakness and numbness.   Psychiatric/Behavioral:  Negative for confusion, sleep disturbance and suicidal ideas. The patient is not nervous/anxious.        Objective     Study Result    Narrative & Impression   Interpreted By:  Flash Alvarenga,   STUDY:  CT HEAD WO IV CONTRAST;  5/27/2025 6:30 pm      INDICATION:  Signs/Symptoms:headache.        "   COMPARISON:  None.      ACCESSION NUMBER(S):  [id number]      ORDERING CLINICIAN:  REGI SUE      TECHNIQUE:  Noncontrast axial CT scan of head was performed. Angled reformats in  brain and bone windows were generated. The images were reviewed in  bone, brain, blood and soft tissue windows.      FINDINGS:  No intracranial hemorrhage, midline shift, mass effect, or  ventricular enlargement.      No Chiari 1 malformation. The sella turcica and suprasellar cistern  regions appear grossly unremarkable.      The orbits/globes are grossly unremarkable.      The paranasal sinuses and mastoid air cells are partially visualized.  There is partial opacification of the mastoid air cells inferiorly on  the left. There is mild mucosal thickening involving the alveolar  recess of bilateral maxillary sinuses.      On the right there is near-complete opacification of the tympanic  cavity and the mastoid air cells.      There is atherosclerotic calcification of the cavernous carotid  arteries.      Soft tissue structures at the axial level of the skull base appear  unremarkable. No acute osseous abnormality is noted.      IMPRESSION:  No intracranial hemorrhage.      Near-complete opacification of the tympanic cavity and mastoid air  cells on the right. Recommend clinical correlation for signs and  symptoms of otomastoiditis. Nonspecific partial opacification of  mastoid air cells on the left as well.      MACRO:  None      Signed by: Flash Alvarenga 5/27/2025 8:14 PM  Dictation workstation:   [id number]       Interpretation Summary  Show [place] [id number]  Phone [phone], Fax [phone]        Vascular Lab Report     VASC US CAROTID ARTERY DUPLEX BILATERAL     Patient Name:      MIRYAM CHRISTIANSEN JR. Reading Physician: [id number] Cara Pennington MD  Study Date:        4/4/2025            Ordering Provider: [id number] ERIC BARNARD  MRN/PID:           [id number]           Fellow:  Accession#:        [id number]       Technologist:      Sagrario Riley RVT/AB  Date of Birth/Age: 1970 / 54      Technologist 2:                     years  Gender:            M                  Encounter#:        [id number]  Admission Status:  Outpatient         Location           [facility]                                        Performed:        Diagnosis/ICD: Other specified symptoms and signs involving the circulatory and                 respiratory systems-R09.89  CPT Codes:     51927 Cerebrovascular Carotid Duplex scan complete        CONCLUSIONS:  Right Carotid: Findings are consistent with less than 50% stenosis of the right proximal internal carotid artery. Right external carotid artery appears patent with no evidence of stenosis. The right vertebral artery is patent with antegrade flow. No evidence of hemodynamically significant stenosis in the right subclavian artery.  Left Carotid: The left proximal internal carotid artery is normal. Left external carotid artery appears patent with no evidence of stenosis. The left vertebral artery is patent with antegrade flow. No evidence of hemodynamically significant stenosis in the left subclavian artery.     Imaging & Doppler Findings:  Right Plaque Morph: The proximal right internal carotid artery demonstrates heterogenous plaque. The mid right common carotid artery demonstrates heterogenous plaque. The distal right common carotid artery demonstrates heterogenous plaque. The right carotid bulb demonstrates heterogenous plaque.  Left Plaque Morph: The mid left common carotid artery demonstrates heterogenous plaque. The distal left common carotid artery demonstrates heterogenous plaque.      Right                        Left    PSV      EDV                PSV      EDV  111 cm/s           CCA P    121 cm/s  75 cm/s            CCA D    88 cm/s  76 cm/s             ICA P    75 cm/s  30 cm/s  95 cm/s  38 cm/s   ICA D    74 cm/s  27 cm/s  114 cm/s            ECA     111 cm/s  60 cm/s  18 cm/s Vertebral  47 cm/s  17 cm/s  104 cm/s         Subclavian 96 cm/s                      Right Left  ICA/CCA Ratio  1.0  0.9           [id number] Cara Pennington MD  Electronically signed by [id number] Cara Pennington MD on 4/6/2025 at 12:18:41 PM           ** Final **    Physical Exam  Constitutional:       Appearance: Normal appearance. He is normal weight.   HENT:      Head: Normocephalic.     Eyes:      Extraocular Movements: Extraocular movements intact.      Conjunctiva/sclera: Conjunctivae normal.      Pupils: Pupils are equal, round, and reactive to light.       Cardiovascular:      Rate and Rhythm: Normal rate and regular rhythm.      Pulses: Normal pulses.      Heart sounds: Normal heart sounds.   Pulmonary:      Effort: Pulmonary effort is normal.      Breath sounds: Normal breath sounds.     Musculoskeletal:         General: Normal range of motion.      Cervical back: Normal range of motion.     Skin:     General: Skin is warm and dry.     Neurological:      General: No focal deficit present.      Mental Status: He is alert and oriented to person, place, and time.     Psychiatric:         Mood and Affect: Mood normal.         Behavior: Behavior normal.         Thought Content: Thought content normal.         Judgment: Judgment normal.            Assessment/Plan   Problem List Items Addressed This Visit       GERD (gastroesophageal reflux disease)    Relevant Medications    famotidine (Pepcid) 20 mg tablet    Hypertension associated with diabetes    Relevant Orders    CBC and Auto Differential    Comprehensive Metabolic Panel    Mixed hyperlipidemia due to type 2 diabetes mellitus (Multi)    Relevant Orders    TSH with reflex to Free T4 if abnormal    Class 2 severe obesity due to excess calories with serious comorbidity and body mass index (BMI) of 37.0 to 37.9 in adult     Other Visit  Diagnoses         Need for hepatitis B screening test    -  Primary    Relevant Orders    Hepatitis B Surface Antigen    Hepatitis B Surface Antibody      Need for hepatitis C screening test        Relevant Orders    Hepatitis C Antibody      Encounter for screening for HIV        Relevant Orders    HIV 1/2 Antigen/Antibody Screen with Reflex to Confirmation      Acute intractable headache, unspecified headache type        Relevant Medications    predniSONE (Deltasone) 10 mg tablet    magnesium oxide 400 mg magnesium capsule    naproxen (Naprosyn) 500 mg tablet      Low iron        Relevant Orders    Ferritin    Folate    Iron and TIBC      Low hemoglobin        Relevant Orders    Vitamin B12      Dizziness        Relevant Orders    Magnesium      Vitamin D deficiency        Relevant Orders    Vitamin D 25-Hydroxy,Total (for eval of Vitamin D levels)    Parathyroid Hormone, Intact      Urinary tract infection without hematuria, site unspecified        Relevant Orders    Urine Culture    Microscopic Only, Urine        RECHECK URINE AFTER COMPLETING LEVAQUIN. DO LABS AND RETURN IN 2 WEEKS. HE IS REFUSING TO START PREVENTATIVE HA MEDS OR NEURO REFERRAL AT THIS TIME. I WILL GIVE HIM A HA COCKTAIL. HE DOES ADMIT THAT HE HAS NOT HAD HIS EYES CHECKED IN SOMETIME. INSTRUCTED TO DO SO. HE STATES HE WILL MAKE AN APPT TO GET IN ASAP.     WE DISCUSSED MOST COMMON SIDE EFFECTS OF PRESCRIBED MEDICATIONS. INDICATIONS, RISK, COMPLICATIONS, AND ALTERNATIVES OF MEDICATION/THERAPEUTICS WERE EXPLAINED AND DISCUSSED. PLEASE MONITOR CLOSELY FOR ANY UNTOWARD SIDE EFFECTS OR COMPLICATIONS OF MEDICATIONS. PATIENT IS STRONGLY ADVISED TO BE COMPLIANT WITH RECOMMENDATIONS. QUESTIONS AND CONCERNS WERE ADDRESSED. INSTRUCTED TO CALL, RETURN SOONER, OR GO TO THE ER,  IF SYMPTOMS PERSIST OR WORSEN. THEY VOICED UNDERSTANDING AND  DENIES FURTHER QUESTIONS AT THIS TIME.    TIME CODE  1. PREPARATION FOR PATIENT'S VISIT (REVIEWING CHART, CURRENT MEDICAL  RECORDS, OUTSIDE HEALTH PROVIDER RECORDS, PREVIOUS HISTORY, EXAM, TEST, PROCEDURE, AND MEDICATIONS)  2. FACE TO FACE ENCOUNTER OBTAINING HISTORY FROM THE PATIENT/FAMILY/CAREGIVERS; PERFORMING EVALUATION AND EXAMINATION; ORDERING TESTS OR PROCEDURES; REFERRING AND COMMUNICATING WITH OTHER HEALTHCARE PROVIDERS; COUNSELING AND EDUCATION OF THE PATIENT/FAMILY/CAREGIVERS; INDEPENDENTLY INTERPRETING RESULTS (TESTS, LABS, PROCEDURES, IMAGING) AND COMMUNICATING AND EXPLAINING RESULTS TO THE PATIENT/FAMILY/CAREGIVERS  3. COORDINATION OF CARE; PREPARING AND PRINTING DISCHARGE INSTRUCTIONS AND ANY EDUCATIONAL MATERIAL FOR THE PATIENT/FAMILY/CAREGIVERS. DOCUMENTING CLINICAL INFORMATION IN THE ELECTRONIC MEDICAL RECORD   4. REVIEWING OARRS AS NEEDED    MDM  1) COMPLEXITY: MORE THAN 1 STABLE CHRONIC CONDITION ADDRESSED OR 1 ACUTE ILLNESS ADDRESSED   2)DATA: TESTS INTERPRETED AND OR ORDERED, TOOK INDEPENDENT HISTORY OR RECORDS REVIEWED  3)RISK: MODERATE RISK DUE TO NATURE OF MEDICAL CONDITIONS/COMORBIDITY OR MEDICATIONS ORDERED OR SURGICAL OR PROCEDURE REFERRAL    2 WEEKS WITH LABS

## 2025-08-09 LAB
25(OH)D3+25(OH)D2 SERPL-MCNC: 39 NG/ML (ref 30–100)
ALBUMIN SERPL-MCNC: 4.5 G/DL (ref 3.6–5.1)
ALP SERPL-CCNC: 61 U/L (ref 35–144)
ALT SERPL-CCNC: 11 U/L (ref 9–46)
ANION GAP SERPL CALCULATED.4IONS-SCNC: 11 MMOL/L (CALC) (ref 7–17)
AST SERPL-CCNC: 11 U/L (ref 10–35)
BASOPHILS # BLD AUTO: 81 CELLS/UL (ref 0–200)
BASOPHILS NFR BLD AUTO: 0.8 %
BILIRUB SERPL-MCNC: 0.8 MG/DL (ref 0.2–1.2)
BUN SERPL-MCNC: 11 MG/DL (ref 7–25)
CALCIUM SERPL-MCNC: 9.8 MG/DL (ref 8.6–10.3)
CHLORIDE SERPL-SCNC: 103 MMOL/L (ref 98–110)
CO2 SERPL-SCNC: 27 MMOL/L (ref 20–32)
CREAT SERPL-MCNC: 0.95 MG/DL (ref 0.7–1.3)
EGFRCR SERPLBLD CKD-EPI 2021: 94 ML/MIN/1.73M2
EOSINOPHIL # BLD AUTO: 91 CELLS/UL (ref 15–500)
EOSINOPHIL NFR BLD AUTO: 0.9 %
ERYTHROCYTE [DISTWIDTH] IN BLOOD BY AUTOMATED COUNT: 15.6 % (ref 11–15)
FERRITIN SERPL-MCNC: 102 NG/ML (ref 38–380)
FOLATE SERPL-MCNC: 9.7 NG/ML
GLUCOSE SERPL-MCNC: 85 MG/DL (ref 65–139)
HBV SURFACE AB SERPL IA-ACNC: <5 MIU/ML
HBV SURFACE AG SERPL QL IA: NORMAL
HCT VFR BLD AUTO: 50.2 % (ref 38.5–50)
HCV AB SERPL QL IA: NORMAL
HGB BLD-MCNC: 16 G/DL (ref 13.2–17.1)
HIV 1+2 AB+HIV1 P24 AG SERPL QL IA: NORMAL
HIV 1+2 AB+HIV1 P24 AG SERPL QL IA: NORMAL
IRON SATN MFR SERPL: 30 % (CALC) (ref 20–48)
IRON SERPL-MCNC: 91 MCG/DL (ref 50–180)
LYMPHOCYTES # BLD AUTO: 2081 CELLS/UL (ref 850–3900)
LYMPHOCYTES NFR BLD AUTO: 20.6 %
MAGNESIUM SERPL-MCNC: 2.1 MG/DL (ref 1.5–2.5)
MCH RBC QN AUTO: 27.4 PG (ref 27–33)
MCHC RBC AUTO-ENTMCNC: 31.9 G/DL (ref 32–36)
MCV RBC AUTO: 86.1 FL (ref 80–100)
MONOCYTES # BLD AUTO: 758 CELLS/UL (ref 200–950)
MONOCYTES NFR BLD AUTO: 7.5 %
NEUTROPHILS # BLD AUTO: 7090 CELLS/UL (ref 1500–7800)
NEUTROPHILS NFR BLD AUTO: 70.2 %
PLATELET # BLD AUTO: 372 THOUSAND/UL (ref 140–400)
PMV BLD REES-ECKER: 10 FL (ref 7.5–12.5)
POTASSIUM SERPL-SCNC: 4.6 MMOL/L (ref 3.5–5.3)
PROT SERPL-MCNC: 7.1 G/DL (ref 6.1–8.1)
PTH-INTACT SERPL-MCNC: 41 PG/ML (ref 16–77)
RBC # BLD AUTO: 5.83 MILLION/UL (ref 4.2–5.8)
SODIUM SERPL-SCNC: 141 MMOL/L (ref 135–146)
TIBC SERPL-MCNC: 302 MCG/DL (CALC) (ref 250–425)
TSH SERPL-ACNC: 2.46 MIU/L (ref 0.4–4.5)
VIT B12 SERPL-MCNC: 390 PG/ML (ref 200–1100)
WBC # BLD AUTO: 10.1 THOUSAND/UL (ref 3.8–10.8)

## 2025-08-11 ENCOUNTER — APPOINTMENT (OUTPATIENT)
Dept: RADIOLOGY | Facility: HOSPITAL | Age: 55
End: 2025-08-11
Payer: COMMERCIAL

## 2025-08-11 ENCOUNTER — HOSPITAL ENCOUNTER (EMERGENCY)
Facility: HOSPITAL | Age: 55
Discharge: HOME | End: 2025-08-11
Payer: COMMERCIAL

## 2025-08-11 ENCOUNTER — APPOINTMENT (OUTPATIENT)
Dept: CARDIOLOGY | Facility: HOSPITAL | Age: 55
End: 2025-08-11
Payer: COMMERCIAL

## 2025-08-11 VITALS
BODY MASS INDEX: 37.22 KG/M2 | TEMPERATURE: 98 F | RESPIRATION RATE: 18 BRPM | OXYGEN SATURATION: 62 % | SYSTOLIC BLOOD PRESSURE: 140 MMHG | DIASTOLIC BLOOD PRESSURE: 91 MMHG | HEIGHT: 74 IN | HEART RATE: 63 BPM | WEIGHT: 290 LBS

## 2025-08-11 DIAGNOSIS — R00.2 PALPITATIONS: ICD-10-CM

## 2025-08-11 DIAGNOSIS — R42 LIGHTHEADEDNESS: Primary | ICD-10-CM

## 2025-08-11 LAB
ALBUMIN SERPL BCP-MCNC: 4.1 G/DL (ref 3.4–5)
ALP SERPL-CCNC: 50 U/L (ref 33–120)
ALT SERPL W P-5'-P-CCNC: 11 U/L (ref 10–52)
ANION GAP SERPL CALC-SCNC: 11 MMOL/L (ref 10–20)
APPEARANCE UR: CLEAR
AST SERPL W P-5'-P-CCNC: 10 U/L (ref 9–39)
BASOPHILS # BLD AUTO: 0.07 X10*3/UL (ref 0–0.1)
BASOPHILS NFR BLD AUTO: 0.8 %
BILIRUB SERPL-MCNC: 0.7 MG/DL (ref 0–1.2)
BILIRUB UR STRIP.AUTO-MCNC: NEGATIVE MG/DL
BUN SERPL-MCNC: 13 MG/DL (ref 6–23)
CALCIUM SERPL-MCNC: 9.2 MG/DL (ref 8.6–10.3)
CARDIAC TROPONIN I PNL SERPL HS: 5 NG/L (ref 0–20)
CHLORIDE SERPL-SCNC: 108 MMOL/L (ref 98–107)
CO2 SERPL-SCNC: 24 MMOL/L (ref 21–32)
COLOR UR: COLORLESS
CREAT SERPL-MCNC: 0.72 MG/DL (ref 0.5–1.3)
D DIMER PPP FEU-MCNC: 719 NG/ML FEU
EGFRCR SERPLBLD CKD-EPI 2021: >90 ML/MIN/1.73M*2
EOSINOPHIL # BLD AUTO: 0.05 X10*3/UL (ref 0–0.7)
EOSINOPHIL NFR BLD AUTO: 0.6 %
ERYTHROCYTE [DISTWIDTH] IN BLOOD BY AUTOMATED COUNT: 15.7 % (ref 11.5–14.5)
GLUCOSE SERPL-MCNC: 110 MG/DL (ref 74–99)
GLUCOSE UR STRIP.AUTO-MCNC: NORMAL MG/DL
HCT VFR BLD AUTO: 45.1 % (ref 41–52)
HGB BLD-MCNC: 14.8 G/DL (ref 13.5–17.5)
IMM GRANULOCYTES # BLD AUTO: 0.03 X10*3/UL (ref 0–0.7)
IMM GRANULOCYTES NFR BLD AUTO: 0.3 % (ref 0–0.9)
KETONES UR STRIP.AUTO-MCNC: NEGATIVE MG/DL
LEUKOCYTE ESTERASE UR QL STRIP.AUTO: NEGATIVE
LYMPHOCYTES # BLD AUTO: 1.74 X10*3/UL (ref 1.2–4.8)
LYMPHOCYTES NFR BLD AUTO: 19.6 %
MCH RBC QN AUTO: 27.1 PG (ref 26–34)
MCHC RBC AUTO-ENTMCNC: 32.8 G/DL (ref 32–36)
MCV RBC AUTO: 83 FL (ref 80–100)
MONOCYTES # BLD AUTO: 0.61 X10*3/UL (ref 0.1–1)
MONOCYTES NFR BLD AUTO: 6.9 %
NEUTROPHILS # BLD AUTO: 6.38 X10*3/UL (ref 1.2–7.7)
NEUTROPHILS NFR BLD AUTO: 71.8 %
NITRITE UR QL STRIP.AUTO: NEGATIVE
NRBC BLD-RTO: 0 /100 WBCS (ref 0–0)
PH UR STRIP.AUTO: 6.5 [PH]
PLATELET # BLD AUTO: 339 X10*3/UL (ref 150–450)
POTASSIUM SERPL-SCNC: 3.7 MMOL/L (ref 3.5–5.3)
PROT SERPL-MCNC: 6.8 G/DL (ref 6.4–8.2)
PROT UR STRIP.AUTO-MCNC: NEGATIVE MG/DL
RBC # BLD AUTO: 5.46 X10*6/UL (ref 4.5–5.9)
RBC # UR STRIP.AUTO: NEGATIVE MG/DL
SODIUM SERPL-SCNC: 139 MMOL/L (ref 136–145)
SP GR UR STRIP.AUTO: 1
UROBILINOGEN UR STRIP.AUTO-MCNC: NORMAL MG/DL
WBC # BLD AUTO: 8.9 X10*3/UL (ref 4.4–11.3)

## 2025-08-11 PROCEDURE — 71275 CT ANGIOGRAPHY CHEST: CPT

## 2025-08-11 PROCEDURE — 99285 EMERGENCY DEPT VISIT HI MDM: CPT | Mod: 25

## 2025-08-11 PROCEDURE — 81003 URINALYSIS AUTO W/O SCOPE: CPT | Performed by: PHYSICIAN ASSISTANT

## 2025-08-11 PROCEDURE — 85379 FIBRIN DEGRADATION QUANT: CPT | Performed by: PHYSICIAN ASSISTANT

## 2025-08-11 PROCEDURE — 93005 ELECTROCARDIOGRAM TRACING: CPT

## 2025-08-11 PROCEDURE — 85025 COMPLETE CBC W/AUTO DIFF WBC: CPT | Performed by: PHYSICIAN ASSISTANT

## 2025-08-11 PROCEDURE — 71045 X-RAY EXAM CHEST 1 VIEW: CPT

## 2025-08-11 PROCEDURE — 2550000001 HC RX 255 CONTRASTS: Performed by: PHYSICIAN ASSISTANT

## 2025-08-11 PROCEDURE — 70450 CT HEAD/BRAIN W/O DYE: CPT

## 2025-08-11 PROCEDURE — 36415 COLL VENOUS BLD VENIPUNCTURE: CPT | Performed by: PHYSICIAN ASSISTANT

## 2025-08-11 PROCEDURE — 84484 ASSAY OF TROPONIN QUANT: CPT | Performed by: PHYSICIAN ASSISTANT

## 2025-08-11 PROCEDURE — 71045 X-RAY EXAM CHEST 1 VIEW: CPT | Performed by: RADIOLOGY

## 2025-08-11 PROCEDURE — 70450 CT HEAD/BRAIN W/O DYE: CPT | Performed by: RADIOLOGY

## 2025-08-11 PROCEDURE — 84075 ASSAY ALKALINE PHOSPHATASE: CPT | Performed by: PHYSICIAN ASSISTANT

## 2025-08-11 PROCEDURE — 71275 CT ANGIOGRAPHY CHEST: CPT | Performed by: RADIOLOGY

## 2025-08-11 RX ADMIN — IOHEXOL 66 ML: 350 INJECTION, SOLUTION INTRAVENOUS at 12:28

## 2025-08-11 ASSESSMENT — PAIN - FUNCTIONAL ASSESSMENT: PAIN_FUNCTIONAL_ASSESSMENT: 0-10

## 2025-08-11 ASSESSMENT — PAIN SCALES - GENERAL: PAINLEVEL_OUTOF10: 4

## 2025-08-11 ASSESSMENT — PAIN DESCRIPTION - LOCATION: LOCATION: HEAD

## 2025-08-12 ENCOUNTER — OFFICE VISIT (OUTPATIENT)
Dept: PRIMARY CARE | Facility: CLINIC | Age: 55
End: 2025-08-12
Payer: COMMERCIAL

## 2025-08-12 ENCOUNTER — APPOINTMENT (OUTPATIENT)
Dept: PRIMARY CARE | Facility: CLINIC | Age: 55
End: 2025-08-12
Payer: COMMERCIAL

## 2025-08-12 VITALS
BODY MASS INDEX: 37.68 KG/M2 | SYSTOLIC BLOOD PRESSURE: 138 MMHG | HEIGHT: 74 IN | WEIGHT: 293.6 LBS | HEART RATE: 86 BPM | DIASTOLIC BLOOD PRESSURE: 88 MMHG

## 2025-08-12 DIAGNOSIS — E66.01 CLASS 2 SEVERE OBESITY DUE TO EXCESS CALORIES WITH SERIOUS COMORBIDITY AND BODY MASS INDEX (BMI) OF 37.0 TO 37.9 IN ADULT: ICD-10-CM

## 2025-08-12 DIAGNOSIS — H70.93 MASTOIDITIS OF BOTH SIDES: Primary | ICD-10-CM

## 2025-08-12 DIAGNOSIS — E66.812 CLASS 2 SEVERE OBESITY DUE TO EXCESS CALORIES WITH SERIOUS COMORBIDITY AND BODY MASS INDEX (BMI) OF 37.0 TO 37.9 IN ADULT: ICD-10-CM

## 2025-08-12 DIAGNOSIS — R42 DIZZINESS: ICD-10-CM

## 2025-08-12 LAB
ATRIAL RATE: 63 BPM
HOLD SPECIMEN: NORMAL
P AXIS: 60 DEGREES
P OFFSET: 189 MS
P ONSET: 127 MS
PR INTERVAL: 168 MS
Q ONSET: 211 MS
QRS COUNT: 10 BEATS
QRS DURATION: 98 MS
QT INTERVAL: 414 MS
QTC CALCULATION(BAZETT): 423 MS
QTC FREDERICIA: 420 MS
R AXIS: 29 DEGREES
T AXIS: 17 DEGREES
T OFFSET: 418 MS
VENTRICULAR RATE: 63 BPM

## 2025-08-12 PROCEDURE — 99214 OFFICE O/P EST MOD 30 MIN: CPT | Performed by: NURSE PRACTITIONER

## 2025-08-12 PROCEDURE — 3008F BODY MASS INDEX DOCD: CPT | Performed by: NURSE PRACTITIONER

## 2025-08-12 PROCEDURE — 4010F ACE/ARB THERAPY RXD/TAKEN: CPT | Performed by: NURSE PRACTITIONER

## 2025-08-12 PROCEDURE — 3079F DIAST BP 80-89 MM HG: CPT | Performed by: NURSE PRACTITIONER

## 2025-08-12 PROCEDURE — 3075F SYST BP GE 130 - 139MM HG: CPT | Performed by: NURSE PRACTITIONER

## 2025-08-12 ASSESSMENT — ENCOUNTER SYMPTOMS
SPEECH DIFFICULTY: 0
SORE THROAT: 0
DIARRHEA: 0
FATIGUE: 0
PHOTOPHOBIA: 0
NUMBNESS: 0
NECK PAIN: 0
CHILLS: 0
BACK PAIN: 0
CHEST TIGHTNESS: 0
UNEXPECTED WEIGHT CHANGE: 0
FACIAL ASYMMETRY: 0
TROUBLE SWALLOWING: 0
HEADACHES: 0
SHORTNESS OF BREATH: 0
DIZZINESS: 1
DYSURIA: 0
VOMITING: 0
JOINT SWELLING: 0
NAUSEA: 0
ARTHRALGIAS: 0
HEMATURIA: 0
WEAKNESS: 0
CONFUSION: 0
COUGH: 0
ABDOMINAL PAIN: 0
SEIZURES: 0
CHOKING: 0
FEVER: 0
SLEEP DISTURBANCE: 0
FREQUENCY: 0
ABDOMINAL DISTENTION: 0
BLOOD IN STOOL: 0
NERVOUS/ANXIOUS: 0
WHEEZING: 0
FLANK PAIN: 0
WOUND: 0
EYE PAIN: 0
PALPITATIONS: 0
DIFFICULTY URINATING: 0
MYALGIAS: 0
EYE REDNESS: 0
CONSTIPATION: 0
APNEA: 0

## 2025-08-12 ASSESSMENT — PATIENT HEALTH QUESTIONNAIRE - PHQ9
SUM OF ALL RESPONSES TO PHQ9 QUESTIONS 1 AND 2: 0
1. LITTLE INTEREST OR PLEASURE IN DOING THINGS: NOT AT ALL
2. FEELING DOWN, DEPRESSED OR HOPELESS: NOT AT ALL

## 2025-08-13 ENCOUNTER — CLINICAL SUPPORT (OUTPATIENT)
Dept: PRIMARY CARE | Facility: CLINIC | Age: 55
End: 2025-08-13
Payer: COMMERCIAL

## 2025-08-13 DIAGNOSIS — E53.8 B12 DEFICIENCY: Primary | ICD-10-CM

## 2025-08-13 PROCEDURE — 96372 THER/PROPH/DIAG INJ SC/IM: CPT | Performed by: NURSE PRACTITIONER

## 2025-08-13 RX ORDER — CYANOCOBALAMIN 1000 UG/ML
1000 INJECTION, SOLUTION INTRAMUSCULAR; SUBCUTANEOUS ONCE
Status: COMPLETED | OUTPATIENT
Start: 2025-08-13 | End: 2025-08-13

## 2025-08-13 RX ADMIN — CYANOCOBALAMIN 1000 MCG: 1000 INJECTION, SOLUTION INTRAMUSCULAR; SUBCUTANEOUS at 13:40

## 2025-08-16 ENCOUNTER — HOSPITAL ENCOUNTER (EMERGENCY)
Dept: CARDIOLOGY | Facility: HOSPITAL | Age: 55
Discharge: HOME | End: 2025-08-16
Payer: COMMERCIAL

## 2025-08-16 ENCOUNTER — APPOINTMENT (OUTPATIENT)
Dept: RADIOLOGY | Facility: HOSPITAL | Age: 55
End: 2025-08-16
Payer: COMMERCIAL

## 2025-08-16 ENCOUNTER — HOSPITAL ENCOUNTER (EMERGENCY)
Facility: HOSPITAL | Age: 55
Discharge: HOME | End: 2025-08-16
Attending: EMERGENCY MEDICINE
Payer: COMMERCIAL

## 2025-08-16 VITALS
WEIGHT: 290 LBS | OXYGEN SATURATION: 95 % | HEIGHT: 74 IN | BODY MASS INDEX: 37.22 KG/M2 | HEART RATE: 61 BPM | DIASTOLIC BLOOD PRESSURE: 74 MMHG | TEMPERATURE: 97.4 F | RESPIRATION RATE: 13 BRPM | SYSTOLIC BLOOD PRESSURE: 111 MMHG

## 2025-08-16 DIAGNOSIS — R00.2 PALPITATIONS: ICD-10-CM

## 2025-08-16 DIAGNOSIS — R53.1 GENERALIZED WEAKNESS: Primary | ICD-10-CM

## 2025-08-16 DIAGNOSIS — R55 NEAR SYNCOPE: ICD-10-CM

## 2025-08-16 LAB
ALBUMIN SERPL BCP-MCNC: 4 G/DL (ref 3.4–5)
ALP SERPL-CCNC: 60 U/L (ref 33–120)
ALT SERPL W P-5'-P-CCNC: 14 U/L (ref 10–52)
ANION GAP SERPL CALC-SCNC: 12 MMOL/L (ref 10–20)
APPEARANCE UR: CLEAR
AST SERPL W P-5'-P-CCNC: 11 U/L (ref 9–39)
BASOPHILS # BLD AUTO: 0.07 X10*3/UL (ref 0–0.1)
BASOPHILS NFR BLD AUTO: 0.8 %
BILIRUB SERPL-MCNC: 0.8 MG/DL (ref 0–1.2)
BILIRUB UR STRIP.AUTO-MCNC: NEGATIVE MG/DL
BUN SERPL-MCNC: 10 MG/DL (ref 6–23)
CALCIUM SERPL-MCNC: 9.3 MG/DL (ref 8.6–10.3)
CARDIAC TROPONIN I PNL SERPL HS: 7 NG/L (ref 0–20)
CARDIAC TROPONIN I PNL SERPL HS: 7 NG/L (ref 0–20)
CHLORIDE SERPL-SCNC: 105 MMOL/L (ref 98–107)
CO2 SERPL-SCNC: 24 MMOL/L (ref 21–32)
COLOR UR: COLORLESS
CREAT SERPL-MCNC: 0.78 MG/DL (ref 0.5–1.3)
D DIMER PPP FEU-MCNC: 735 NG/ML FEU
EGFRCR SERPLBLD CKD-EPI 2021: >90 ML/MIN/1.73M*2
EOSINOPHIL # BLD AUTO: 0.1 X10*3/UL (ref 0–0.7)
EOSINOPHIL NFR BLD AUTO: 1.2 %
ERYTHROCYTE [DISTWIDTH] IN BLOOD BY AUTOMATED COUNT: 15.6 % (ref 11.5–14.5)
GLUCOSE SERPL-MCNC: 99 MG/DL (ref 74–99)
GLUCOSE UR STRIP.AUTO-MCNC: NORMAL MG/DL
HCT VFR BLD AUTO: 45.3 % (ref 41–52)
HGB BLD-MCNC: 15 G/DL (ref 13.5–17.5)
HOLD SPECIMEN: NORMAL
HOLD SPECIMEN: NORMAL
IMM GRANULOCYTES # BLD AUTO: 0.03 X10*3/UL (ref 0–0.7)
IMM GRANULOCYTES NFR BLD AUTO: 0.4 % (ref 0–0.9)
KETONES UR STRIP.AUTO-MCNC: NEGATIVE MG/DL
LEUKOCYTE ESTERASE UR QL STRIP.AUTO: NEGATIVE
LYMPHOCYTES # BLD AUTO: 2.16 X10*3/UL (ref 1.2–4.8)
LYMPHOCYTES NFR BLD AUTO: 26.1 %
MAGNESIUM SERPL-MCNC: 1.91 MG/DL (ref 1.6–2.4)
MCH RBC QN AUTO: 27.3 PG (ref 26–34)
MCHC RBC AUTO-ENTMCNC: 33.1 G/DL (ref 32–36)
MCV RBC AUTO: 82 FL (ref 80–100)
MONOCYTES # BLD AUTO: 0.61 X10*3/UL (ref 0.1–1)
MONOCYTES NFR BLD AUTO: 7.4 %
NEUTROPHILS # BLD AUTO: 5.31 X10*3/UL (ref 1.2–7.7)
NEUTROPHILS NFR BLD AUTO: 64.1 %
NITRITE UR QL STRIP.AUTO: NEGATIVE
NRBC BLD-RTO: 0 /100 WBCS (ref 0–0)
PH UR STRIP.AUTO: 6.5 [PH]
PLATELET # BLD AUTO: 341 X10*3/UL (ref 150–450)
POTASSIUM SERPL-SCNC: 3.4 MMOL/L (ref 3.5–5.3)
PROT SERPL-MCNC: 6.7 G/DL (ref 6.4–8.2)
PROT UR STRIP.AUTO-MCNC: NEGATIVE MG/DL
RBC # BLD AUTO: 5.5 X10*6/UL (ref 4.5–5.9)
RBC # UR STRIP.AUTO: NEGATIVE MG/DL
SODIUM SERPL-SCNC: 138 MMOL/L (ref 136–145)
SP GR UR STRIP.AUTO: 1
UROBILINOGEN UR STRIP.AUTO-MCNC: NORMAL MG/DL
WBC # BLD AUTO: 8.3 X10*3/UL (ref 4.4–11.3)

## 2025-08-16 PROCEDURE — 80053 COMPREHEN METABOLIC PANEL: CPT | Performed by: EMERGENCY MEDICINE

## 2025-08-16 PROCEDURE — 93005 ELECTROCARDIOGRAM TRACING: CPT

## 2025-08-16 PROCEDURE — 71045 X-RAY EXAM CHEST 1 VIEW: CPT

## 2025-08-16 PROCEDURE — 81003 URINALYSIS AUTO W/O SCOPE: CPT | Performed by: EMERGENCY MEDICINE

## 2025-08-16 PROCEDURE — 99285 EMERGENCY DEPT VISIT HI MDM: CPT | Mod: 25

## 2025-08-16 PROCEDURE — 36415 COLL VENOUS BLD VENIPUNCTURE: CPT | Performed by: EMERGENCY MEDICINE

## 2025-08-16 PROCEDURE — 84484 ASSAY OF TROPONIN QUANT: CPT | Performed by: EMERGENCY MEDICINE

## 2025-08-16 PROCEDURE — 85025 COMPLETE CBC W/AUTO DIFF WBC: CPT | Performed by: EMERGENCY MEDICINE

## 2025-08-16 PROCEDURE — 85379 FIBRIN DEGRADATION QUANT: CPT | Performed by: EMERGENCY MEDICINE

## 2025-08-16 PROCEDURE — 71045 X-RAY EXAM CHEST 1 VIEW: CPT | Mod: FOREIGN READ | Performed by: RADIOLOGY

## 2025-08-16 PROCEDURE — 99284 EMERGENCY DEPT VISIT MOD MDM: CPT | Performed by: EMERGENCY MEDICINE

## 2025-08-16 PROCEDURE — 83735 ASSAY OF MAGNESIUM: CPT | Performed by: EMERGENCY MEDICINE

## 2025-08-16 RX ORDER — CYANOCOBALAMIN 1000 UG/ML
1000 INJECTION, SOLUTION INTRAMUSCULAR; SUBCUTANEOUS ONCE
COMMUNITY
End: 2025-08-22 | Stop reason: WASHOUT

## 2025-08-16 ASSESSMENT — PAIN SCALES - GENERAL
PAINLEVEL_OUTOF10: 3

## 2025-08-16 ASSESSMENT — PAIN - FUNCTIONAL ASSESSMENT
PAIN_FUNCTIONAL_ASSESSMENT: 0-10
PAIN_FUNCTIONAL_ASSESSMENT: 0-10

## 2025-08-18 LAB
HOLD SPECIMEN: NORMAL
HOLD SPECIMEN: NORMAL

## 2025-08-22 ENCOUNTER — APPOINTMENT (OUTPATIENT)
Dept: PRIMARY CARE | Facility: CLINIC | Age: 55
End: 2025-08-22
Payer: COMMERCIAL

## 2025-08-22 VITALS
DIASTOLIC BLOOD PRESSURE: 85 MMHG | HEART RATE: 87 BPM | WEIGHT: 301.2 LBS | HEIGHT: 74 IN | SYSTOLIC BLOOD PRESSURE: 130 MMHG | BODY MASS INDEX: 38.65 KG/M2

## 2025-08-22 DIAGNOSIS — E66.01 CLASS 2 SEVERE OBESITY DUE TO EXCESS CALORIES WITH SERIOUS COMORBIDITY AND BODY MASS INDEX (BMI) OF 38.0 TO 38.9 IN ADULT: Primary | ICD-10-CM

## 2025-08-22 DIAGNOSIS — E11.59 HYPERTENSION ASSOCIATED WITH DIABETES: ICD-10-CM

## 2025-08-22 DIAGNOSIS — I15.2 HYPERTENSION ASSOCIATED WITH DIABETES: ICD-10-CM

## 2025-08-22 DIAGNOSIS — E66.812 CLASS 2 SEVERE OBESITY DUE TO EXCESS CALORIES WITH SERIOUS COMORBIDITY AND BODY MASS INDEX (BMI) OF 38.0 TO 38.9 IN ADULT: Primary | ICD-10-CM

## 2025-08-22 LAB
ATRIAL RATE: 74 BPM
P AXIS: 46 DEGREES
P OFFSET: 190 MS
P ONSET: 126 MS
PR INTERVAL: 162 MS
Q ONSET: 207 MS
QRS COUNT: 12 BEATS
QRS DURATION: 96 MS
QT INTERVAL: 394 MS
QTC CALCULATION(BAZETT): 437 MS
QTC FREDERICIA: 422 MS
R AXIS: 47 DEGREES
T AXIS: 50 DEGREES
T OFFSET: 404 MS
VENTRICULAR RATE: 74 BPM

## 2025-08-22 PROCEDURE — 3008F BODY MASS INDEX DOCD: CPT | Performed by: NURSE PRACTITIONER

## 2025-08-22 PROCEDURE — 99213 OFFICE O/P EST LOW 20 MIN: CPT | Performed by: NURSE PRACTITIONER

## 2025-08-22 PROCEDURE — 3075F SYST BP GE 130 - 139MM HG: CPT | Performed by: NURSE PRACTITIONER

## 2025-08-22 PROCEDURE — 3079F DIAST BP 80-89 MM HG: CPT | Performed by: NURSE PRACTITIONER

## 2025-08-22 PROCEDURE — 4010F ACE/ARB THERAPY RXD/TAKEN: CPT | Performed by: NURSE PRACTITIONER

## 2025-08-22 ASSESSMENT — ENCOUNTER SYMPTOMS
BACK PAIN: 0
ABDOMINAL DISTENTION: 0
CONFUSION: 0
EYE REDNESS: 0
CHOKING: 0
UNEXPECTED WEIGHT CHANGE: 0
EYE PAIN: 0
NAUSEA: 0
NUMBNESS: 0
PALPITATIONS: 0
SPEECH DIFFICULTY: 0
CHEST TIGHTNESS: 0
ARTHRALGIAS: 0
WOUND: 0
JOINT SWELLING: 0
NECK PAIN: 0
BLOOD IN STOOL: 0
WHEEZING: 0
FATIGUE: 0
MYALGIAS: 0
DYSURIA: 0
SORE THROAT: 0
DIARRHEA: 0
SEIZURES: 0
TROUBLE SWALLOWING: 0
COUGH: 0
SHORTNESS OF BREATH: 0
VOMITING: 0
SLEEP DISTURBANCE: 0
DIZZINESS: 0
FREQUENCY: 0
DIFFICULTY URINATING: 0
ABDOMINAL PAIN: 0
FACIAL ASYMMETRY: 0
HEMATURIA: 0
CHILLS: 0
HEADACHES: 0
FEVER: 0
PHOTOPHOBIA: 0
APNEA: 0
NERVOUS/ANXIOUS: 0
FLANK PAIN: 0
CONSTIPATION: 0
WEAKNESS: 0

## 2025-08-26 ENCOUNTER — APPOINTMENT (OUTPATIENT)
Dept: NEPHROLOGY | Facility: CLINIC | Age: 55
End: 2025-08-26
Payer: COMMERCIAL

## 2025-08-26 VITALS
DIASTOLIC BLOOD PRESSURE: 80 MMHG | HEART RATE: 64 BPM | WEIGHT: 305.4 LBS | SYSTOLIC BLOOD PRESSURE: 136 MMHG | BODY MASS INDEX: 39.19 KG/M2 | HEIGHT: 74 IN

## 2025-08-26 DIAGNOSIS — N28.1 RENAL CYST: Primary | ICD-10-CM

## 2025-08-26 PROCEDURE — 99204 OFFICE O/P NEW MOD 45 MIN: CPT | Performed by: INTERNAL MEDICINE

## 2025-08-26 PROCEDURE — 4010F ACE/ARB THERAPY RXD/TAKEN: CPT | Performed by: INTERNAL MEDICINE

## 2025-08-26 PROCEDURE — 3075F SYST BP GE 130 - 139MM HG: CPT | Performed by: INTERNAL MEDICINE

## 2025-08-26 PROCEDURE — 3079F DIAST BP 80-89 MM HG: CPT | Performed by: INTERNAL MEDICINE

## 2025-08-26 PROCEDURE — 3008F BODY MASS INDEX DOCD: CPT | Performed by: INTERNAL MEDICINE

## 2025-08-26 ASSESSMENT — ENCOUNTER SYMPTOMS
FREQUENCY: 1
HEMATOLOGIC/LYMPHATIC NEGATIVE: 1
ENDOCRINE NEGATIVE: 1
EYES NEGATIVE: 1
CONSTITUTIONAL NEGATIVE: 1
PSYCHIATRIC NEGATIVE: 1
NEUROLOGICAL NEGATIVE: 1
RESPIRATORY NEGATIVE: 1
MUSCULOSKELETAL NEGATIVE: 1
ALLERGIC/IMMUNOLOGIC NEGATIVE: 1
CARDIOVASCULAR NEGATIVE: 1
GASTROINTESTINAL NEGATIVE: 1

## 2025-09-11 ENCOUNTER — APPOINTMENT (OUTPATIENT)
Dept: PRIMARY CARE | Facility: CLINIC | Age: 55
End: 2025-09-11
Payer: COMMERCIAL

## 2025-09-12 ENCOUNTER — APPOINTMENT (OUTPATIENT)
Dept: CARDIOLOGY | Facility: CLINIC | Age: 55
End: 2025-09-12
Payer: COMMERCIAL

## 2025-09-23 ENCOUNTER — APPOINTMENT (OUTPATIENT)
Dept: PRIMARY CARE | Facility: CLINIC | Age: 55
End: 2025-09-23
Payer: COMMERCIAL

## 2025-10-14 ENCOUNTER — APPOINTMENT (OUTPATIENT)
Dept: CARDIOLOGY | Facility: CLINIC | Age: 55
End: 2025-10-14
Payer: COMMERCIAL

## 2026-09-01 ENCOUNTER — APPOINTMENT (OUTPATIENT)
Dept: NEPHROLOGY | Facility: CLINIC | Age: 56
End: 2026-09-01
Payer: COMMERCIAL